# Patient Record
Sex: MALE | Race: WHITE | NOT HISPANIC OR LATINO | Employment: FULL TIME | URBAN - METROPOLITAN AREA
[De-identification: names, ages, dates, MRNs, and addresses within clinical notes are randomized per-mention and may not be internally consistent; named-entity substitution may affect disease eponyms.]

---

## 2017-09-20 ENCOUNTER — TRANSCRIBE ORDERS (OUTPATIENT)
Dept: ADMINISTRATIVE | Age: 62
End: 2017-09-20

## 2017-09-20 ENCOUNTER — APPOINTMENT (OUTPATIENT)
Dept: LAB | Age: 62
End: 2017-09-20
Payer: COMMERCIAL

## 2017-09-20 DIAGNOSIS — E11.9 DIABETES MELLITUS WITHOUT COMPLICATION (HCC): Primary | ICD-10-CM

## 2017-09-20 DIAGNOSIS — R73.01 IMPAIRED FASTING GLUCOSE: ICD-10-CM

## 2017-09-20 DIAGNOSIS — R35.0 URINARY FREQUENCY: ICD-10-CM

## 2017-09-20 DIAGNOSIS — E11.9 DIABETES MELLITUS WITHOUT COMPLICATION (HCC): ICD-10-CM

## 2017-09-20 LAB
ALBUMIN SERPL BCP-MCNC: 4 G/DL (ref 3.5–5)
ALP SERPL-CCNC: 77 U/L (ref 46–116)
ALT SERPL W P-5'-P-CCNC: 157 U/L (ref 12–78)
ANION GAP SERPL CALCULATED.3IONS-SCNC: 10 MMOL/L (ref 4–13)
AST SERPL W P-5'-P-CCNC: 123 U/L (ref 5–45)
BACTERIA UR QL AUTO: ABNORMAL /HPF
BILIRUB SERPL-MCNC: 2.75 MG/DL (ref 0.2–1)
BILIRUB UR QL STRIP: NEGATIVE
BUN SERPL-MCNC: 15 MG/DL (ref 5–25)
CALCIUM SERPL-MCNC: 9.4 MG/DL (ref 8.3–10.1)
CHLORIDE SERPL-SCNC: 99 MMOL/L (ref 100–108)
CHOLEST SERPL-MCNC: 190 MG/DL (ref 50–200)
CLARITY UR: CLEAR
CO2 SERPL-SCNC: 26 MMOL/L (ref 21–32)
COLOR UR: YELLOW
CREAT SERPL-MCNC: 1.14 MG/DL (ref 0.6–1.3)
CREAT UR-MCNC: 120 MG/DL
ERYTHROCYTE [DISTWIDTH] IN BLOOD BY AUTOMATED COUNT: 12.3 % (ref 11.6–15.1)
EST. AVERAGE GLUCOSE BLD GHB EST-MCNC: 263 MG/DL
GFR SERPL CREATININE-BSD FRML MDRD: 69 ML/MIN/1.73SQ M
GLUCOSE P FAST SERPL-MCNC: 369 MG/DL (ref 65–99)
GLUCOSE UR STRIP-MCNC: ABNORMAL MG/DL
HBA1C MFR BLD: 10.8 % (ref 4.2–6.3)
HCT VFR BLD AUTO: 46.2 % (ref 36.5–49.3)
HDLC SERPL-MCNC: 41 MG/DL (ref 40–60)
HGB BLD-MCNC: 16.2 G/DL (ref 12–17)
HGB UR QL STRIP.AUTO: NEGATIVE
KETONES UR STRIP-MCNC: ABNORMAL MG/DL
LDLC SERPL CALC-MCNC: 82 MG/DL (ref 0–100)
LEUKOCYTE ESTERASE UR QL STRIP: NEGATIVE
MCH RBC QN AUTO: 32.7 PG (ref 26.8–34.3)
MCHC RBC AUTO-ENTMCNC: 35.1 G/DL (ref 31.4–37.4)
MCV RBC AUTO: 93 FL (ref 82–98)
MICROALBUMIN UR-MCNC: 112 MG/L (ref 0–20)
MICROALBUMIN/CREAT 24H UR: 93 MG/G CREATININE (ref 0–30)
NITRITE UR QL STRIP: NEGATIVE
NON-SQ EPI CELLS URNS QL MICRO: ABNORMAL /HPF
PH UR STRIP.AUTO: 6 [PH] (ref 4.5–8)
PLATELET # BLD AUTO: 140 THOUSANDS/UL (ref 149–390)
PMV BLD AUTO: 12.8 FL (ref 8.9–12.7)
POTASSIUM SERPL-SCNC: 4.2 MMOL/L (ref 3.5–5.3)
PROT SERPL-MCNC: 8.3 G/DL (ref 6.4–8.2)
PROT UR STRIP-MCNC: ABNORMAL MG/DL
PSA SERPL-MCNC: 1.6 NG/ML (ref 0–4)
RBC # BLD AUTO: 4.96 MILLION/UL (ref 3.88–5.62)
RBC #/AREA URNS AUTO: ABNORMAL /HPF
SODIUM SERPL-SCNC: 135 MMOL/L (ref 136–145)
SP GR UR STRIP.AUTO: 1.04 (ref 1–1.03)
TRIGL SERPL-MCNC: 333 MG/DL
UROBILINOGEN UR QL STRIP.AUTO: 1 E.U./DL
WBC # BLD AUTO: 7.38 THOUSAND/UL (ref 4.31–10.16)
WBC #/AREA URNS AUTO: ABNORMAL /HPF

## 2017-09-20 PROCEDURE — G0103 PSA SCREENING: HCPCS

## 2017-09-20 PROCEDURE — 81001 URINALYSIS AUTO W/SCOPE: CPT | Performed by: FAMILY MEDICINE

## 2017-09-20 PROCEDURE — 36415 COLL VENOUS BLD VENIPUNCTURE: CPT

## 2017-09-20 PROCEDURE — 83036 HEMOGLOBIN GLYCOSYLATED A1C: CPT

## 2017-09-20 PROCEDURE — 82570 ASSAY OF URINE CREATININE: CPT | Performed by: FAMILY MEDICINE

## 2017-09-20 PROCEDURE — 85027 COMPLETE CBC AUTOMATED: CPT

## 2017-09-20 PROCEDURE — 82043 UR ALBUMIN QUANTITATIVE: CPT | Performed by: FAMILY MEDICINE

## 2017-09-20 PROCEDURE — 80061 LIPID PANEL: CPT

## 2017-09-20 PROCEDURE — 80053 COMPREHEN METABOLIC PANEL: CPT

## 2017-10-30 ENCOUNTER — APPOINTMENT (OUTPATIENT)
Dept: LAB | Age: 62
End: 2017-10-30
Payer: COMMERCIAL

## 2017-10-30 ENCOUNTER — TRANSCRIBE ORDERS (OUTPATIENT)
Dept: ADMINISTRATIVE | Age: 62
End: 2017-10-30

## 2017-10-30 DIAGNOSIS — D69.6 ACQUIRED THROMBOCYTOPENIA (HCC): Primary | ICD-10-CM

## 2017-10-30 DIAGNOSIS — E11.8 TYPE 2 DIABETES MELLITUS WITH COMPLICATION, UNSPECIFIED LONG TERM INSULIN USE STATUS: ICD-10-CM

## 2017-10-30 DIAGNOSIS — E78.2 MIXED HYPERLIPIDEMIA: ICD-10-CM

## 2017-10-30 DIAGNOSIS — R94.5 NONSPECIFIC ABNORMAL RESULTS OF LIVER FUNCTION STUDY: ICD-10-CM

## 2017-10-30 DIAGNOSIS — D69.6 ACQUIRED THROMBOCYTOPENIA (HCC): ICD-10-CM

## 2017-10-30 LAB
ALBUMIN SERPL BCP-MCNC: 4.2 G/DL (ref 3.5–5)
ALP SERPL-CCNC: 50 U/L (ref 46–116)
ALT SERPL W P-5'-P-CCNC: 62 U/L (ref 12–78)
ANION GAP SERPL CALCULATED.3IONS-SCNC: 7 MMOL/L (ref 4–13)
AST SERPL W P-5'-P-CCNC: 32 U/L (ref 5–45)
BASOPHILS # BLD AUTO: 0.02 THOUSANDS/ΜL (ref 0–0.1)
BASOPHILS NFR BLD AUTO: 0 % (ref 0–1)
BILIRUB SERPL-MCNC: 1.54 MG/DL (ref 0.2–1)
BUN SERPL-MCNC: 24 MG/DL (ref 5–25)
CALCIUM SERPL-MCNC: 9.9 MG/DL (ref 8.3–10.1)
CHLORIDE SERPL-SCNC: 106 MMOL/L (ref 100–108)
CO2 SERPL-SCNC: 26 MMOL/L (ref 21–32)
CREAT SERPL-MCNC: 1.05 MG/DL (ref 0.6–1.3)
EOSINOPHIL # BLD AUTO: 0.13 THOUSAND/ΜL (ref 0–0.61)
EOSINOPHIL NFR BLD AUTO: 2 % (ref 0–6)
ERYTHROCYTE [DISTWIDTH] IN BLOOD BY AUTOMATED COUNT: 12.4 % (ref 11.6–15.1)
GFR SERPL CREATININE-BSD FRML MDRD: 76 ML/MIN/1.73SQ M
GLUCOSE SERPL-MCNC: 85 MG/DL (ref 65–140)
HCT VFR BLD AUTO: 44.4 % (ref 36.5–49.3)
HGB BLD-MCNC: 15.6 G/DL (ref 12–17)
LYMPHOCYTES # BLD AUTO: 2.28 THOUSANDS/ΜL (ref 0.6–4.47)
LYMPHOCYTES NFR BLD AUTO: 27 % (ref 14–44)
MCH RBC QN AUTO: 33.4 PG (ref 26.8–34.3)
MCHC RBC AUTO-ENTMCNC: 35.1 G/DL (ref 31.4–37.4)
MCV RBC AUTO: 95 FL (ref 82–98)
MONOCYTES # BLD AUTO: 0.73 THOUSAND/ΜL (ref 0.17–1.22)
MONOCYTES NFR BLD AUTO: 9 % (ref 4–12)
NEUTROPHILS # BLD AUTO: 5.22 THOUSANDS/ΜL (ref 1.85–7.62)
NEUTS SEG NFR BLD AUTO: 62 % (ref 43–75)
NRBC BLD AUTO-RTO: 0 /100 WBCS
PLATELET # BLD AUTO: 172 THOUSANDS/UL (ref 149–390)
PMV BLD AUTO: 12.4 FL (ref 8.9–12.7)
POTASSIUM SERPL-SCNC: 5 MMOL/L (ref 3.5–5.3)
PROT SERPL-MCNC: 8.3 G/DL (ref 6.4–8.2)
RBC # BLD AUTO: 4.67 MILLION/UL (ref 3.88–5.62)
SODIUM SERPL-SCNC: 139 MMOL/L (ref 136–145)
WBC # BLD AUTO: 8.4 THOUSAND/UL (ref 4.31–10.16)

## 2017-10-30 PROCEDURE — 80053 COMPREHEN METABOLIC PANEL: CPT

## 2017-10-30 PROCEDURE — 85025 COMPLETE CBC W/AUTO DIFF WBC: CPT

## 2017-10-30 PROCEDURE — 36415 COLL VENOUS BLD VENIPUNCTURE: CPT

## 2017-12-22 ENCOUNTER — APPOINTMENT (OUTPATIENT)
Dept: LAB | Age: 62
End: 2017-12-22
Payer: COMMERCIAL

## 2017-12-22 ENCOUNTER — TRANSCRIBE ORDERS (OUTPATIENT)
Dept: ADMINISTRATIVE | Age: 62
End: 2017-12-22

## 2017-12-22 DIAGNOSIS — R94.5 NONSPECIFIC ABNORMAL RESULTS OF LIVER FUNCTION STUDY: ICD-10-CM

## 2017-12-22 DIAGNOSIS — R94.5 NONSPECIFIC ABNORMAL RESULTS OF LIVER FUNCTION STUDY: Primary | ICD-10-CM

## 2017-12-22 DIAGNOSIS — E08.00 DIABETES MELLITUS DUE TO UNDERLYING CONDITION WITH HYPEROSMOLARITY WITHOUT COMA, WITHOUT LONG-TERM CURRENT USE OF INSULIN (HCC): ICD-10-CM

## 2017-12-22 LAB
BILIRUB DIRECT SERPL-MCNC: 0.29 MG/DL (ref 0–0.2)
BILIRUB SERPL-MCNC: 0.96 MG/DL (ref 0.2–1)
EST. AVERAGE GLUCOSE BLD GHB EST-MCNC: 123 MG/DL
HBA1C MFR BLD: 5.9 % (ref 4.2–6.3)

## 2017-12-22 PROCEDURE — 83036 HEMOGLOBIN GLYCOSYLATED A1C: CPT

## 2017-12-22 PROCEDURE — 82248 BILIRUBIN DIRECT: CPT

## 2017-12-22 PROCEDURE — 82247 BILIRUBIN TOTAL: CPT

## 2017-12-22 PROCEDURE — 36415 COLL VENOUS BLD VENIPUNCTURE: CPT

## 2021-07-23 ENCOUNTER — OFFICE VISIT (OUTPATIENT)
Dept: INTERNAL MEDICINE CLINIC | Facility: CLINIC | Age: 66
End: 2021-07-23
Payer: MEDICARE

## 2021-07-23 VITALS
DIASTOLIC BLOOD PRESSURE: 78 MMHG | TEMPERATURE: 97.8 F | HEIGHT: 78 IN | SYSTOLIC BLOOD PRESSURE: 130 MMHG | HEART RATE: 87 BPM | WEIGHT: 242.8 LBS | OXYGEN SATURATION: 98 % | BODY MASS INDEX: 28.09 KG/M2

## 2021-07-23 DIAGNOSIS — E11.9 TYPE 2 DIABETES MELLITUS WITHOUT COMPLICATION, WITHOUT LONG-TERM CURRENT USE OF INSULIN (HCC): ICD-10-CM

## 2021-07-23 DIAGNOSIS — Z76.89 ENCOUNTER TO ESTABLISH CARE: Primary | ICD-10-CM

## 2021-07-23 DIAGNOSIS — Z12.11 ENCOUNTER FOR COLONOSCOPY DUE TO HISTORY OF COLONIC POLYP: ICD-10-CM

## 2021-07-23 DIAGNOSIS — Z86.010 ENCOUNTER FOR COLONOSCOPY DUE TO HISTORY OF COLONIC POLYP: ICD-10-CM

## 2021-07-23 PROBLEM — Z86.0100 ENCOUNTER FOR COLONOSCOPY DUE TO HISTORY OF COLONIC POLYP: Status: ACTIVE | Noted: 2021-07-23

## 2021-07-23 PROCEDURE — 99203 OFFICE O/P NEW LOW 30 MIN: CPT | Performed by: HOSPITALIST

## 2021-07-23 RX ORDER — SILDENAFIL 100 MG/1
TABLET, FILM COATED ORAL
COMMUNITY
Start: 2021-07-14 | End: 2022-03-31

## 2021-07-23 RX ORDER — CHLORAL HYDRATE 500 MG
500 CAPSULE ORAL DAILY
COMMUNITY
End: 2022-03-31

## 2021-07-23 RX ORDER — LISINOPRIL 5 MG/1
TABLET ORAL
COMMUNITY
Start: 2021-03-09

## 2021-07-23 RX ORDER — ATORVASTATIN CALCIUM 20 MG/1
20 TABLET, FILM COATED ORAL DAILY
COMMUNITY

## 2021-07-23 RX ORDER — ASPIRIN 81 MG/1
81 TABLET ORAL DAILY
COMMUNITY
End: 2022-03-31

## 2021-07-23 NOTE — PROGRESS NOTES
Assessment/Plan:    Encounter to establish care  ·  There is no specific complaints or any symptoms reported by the patient  ·  physical exam is completely unremarkable  ·  most recent blood work from 2019, but patient reported some of his blood work was done through the [facility]  ·  patient will try to obtain records, if it is not done will consider CBC, CMP, lipid panel, hemoglobin A1c, albumin creatinine ratio  ·  continue current medical management    Type 2 diabetes mellitus without complication, without long-term current use of insulin ([facility] [address] )    Lab Results   Component Value Date    HGBA1C 6 3 (H) 04/08/2019     · very well controlled type 2 diabetes currently on metformin 500 mg b i d  ·  patient is currently on strict diet and exercise program with an excellent glycemic control  ·   For currently following up with Podiatry and Ophthalmology once yearly  ·  30 lb of intention weight loss since last year  ·  continue  Current regimen  ·  continue statin  ·  continue lisinopril for previously noted proteinuria  ·  consider repeat hemoglobin A1c, lipid panel, albumin creatinine ratio      Encounter for colonoscopy due to history of colonic polyp  ·  Recent colonoscopy 3 years ago showed 1 polyp, tissue pathology report is not available, based on patient was unremarkable and recommendations for repeat colonoscopy in 3-5 years  ·   Referral to gastroenterology for repeat colonoscopy         Diagnoses and all orders for this visit:    Encounter to establish care    Encounter for colonoscopy due to history of colonic polyp  -     Ambulatory referral for colonoscopy; Future    Type 2 diabetes mellitus without complication, without long-term current use of insulin (HCC)    Other orders  -     aspirin (ECOTRIN LOW STRENGTH) 81 mg EC tablet; Take 81 mg by mouth daily  -     atorvastatin (LIPITOR) 20 mg tablet;  Take 20 mg by mouth daily  -     lisinopril (ZESTRIL) 5 mg tablet; TAKE ONE TABLET BY MOUTH EVERY DAY FOR BLOOD PRESSURE/HEART  -     sildenafil (VIAGRA) 100 mg tablet; TAKE ONE TABLET BY MOUTH EVERY DAY AS NEEDED PRIOR TO SEXUAL ACTIVITY FOR ERECTILE DYSFUNCTION  -     metFORMIN (GLUCOPHAGE) 500 mg tablet; Take 1,000 mg by mouth  -     Multiple Vitamins-Minerals (CENTRUM ADULTS PO); Take 50 mg by mouth  -     Omega-3 Fatty Acids (fish oil) 1,000 mg; Take 500 mg by mouth daily           Subjective:      Patient ID: Kobe Murray is a 72 y o  male  patient is a pleasant 42-year-old male with a medical history significant for type 2 diabetes who is being evaluated today to establish care  Patient receives most of his medical care at the [place] [address]  He has no significant concerns or complaints for today's visit, he denies any fevers, chills, shortness breath, palpitations, lightheadedness or dizziness, urinary symptoms or bowel movement changes  patient takes in very good care of himself, never smoker, no history of drug use, drinks up to 6 drinks /week   He was recently following strict diet with exercise lost around 30 lb since last year  Patient keeps a record of his blood glucose, blood pressure readings and his weight and showed very good glycemic control and normal blood pressure  Patient has a colonoscopy done 3 years ago with 1 polyp removed  Recommendations at that time was for repeat in 3-5 years  Patient requesting a repeat colonoscopy  patient vaccinations are up-to-date and he completed his COVID-19  vaccination      The following portions of the patient's history were reviewed and updated as appropriate: allergies, current medications, past family history, past medical history, past social history, past surgical history and problem list       Review of Systems   Constitutional: Negative for activity change, chills, fatigue and fever  HENT: Negative for congestion  Respiratory: Negative for cough, chest tightness, shortness of breath and wheezing      Cardiovascular: Negative for chest pain, palpitations and leg swelling  Gastrointestinal: Negative for abdominal pain, constipation, nausea and vomiting  Genitourinary: Negative for dysuria and hematuria  Musculoskeletal: Negative for arthralgias and back pain  Neurological: Negative for dizziness, weakness and headaches  Psychiatric/Behavioral: The patient is not nervous/anxious  Objective:      /78   Pulse 87   Temp 97 8 °F (36 6 °C) (Tympanic)   Ht 6' 6" (1 981 m)   Wt 110 kg (242 lb 12 8 oz)   SpO2 98%   BMI 28 06 kg/m²          Physical Exam  Vitals reviewed  Constitutional:       General: He is not in acute distress  Appearance: Normal appearance  He is not ill-appearing  HENT:      Head: Normocephalic and atraumatic  Cardiovascular:      Rate and Rhythm: Normal rate and regular rhythm  Pulses: Normal pulses  Heart sounds: Normal heart sounds  No murmur heard  Pulmonary:      Effort: Pulmonary effort is normal  No respiratory distress  Breath sounds: Normal breath sounds  No wheezing  Abdominal:      General: Bowel sounds are normal  There is no distension  Palpations: Abdomen is soft  Tenderness: There is no abdominal tenderness  Musculoskeletal:         General: No swelling or tenderness  Right lower leg: No edema  Left lower leg: No edema  Skin:     General: Skin is warm and dry  Neurological:      General: No focal deficit present  Mental Status: He is alert and oriented to person, place, and time     Psychiatric:         Mood and Affect: Mood normal          Behavior: Behavior normal

## 2021-07-23 NOTE — ASSESSMENT & PLAN NOTE
Lab Results   Component Value Date    HGBA1C 6 3 (H) 04/08/2019     ·  very well controlled type 2 diabetes currently on metformin 500 mg b i d    ·  patient is currently on strict diet and exercise program with an excellent glycemic control  ·   For currently following up with Podiatry and Ophthalmology once yearly  ·  30 lb of intention weight loss since last year  ·  continue  Current regimen  ·  continue statin  ·  continue lisinopril for previously noted proteinuria  ·  consider repeat hemoglobin A1c, lipid panel, albumin creatinine ratio

## 2021-07-23 NOTE — PATIENT INSTRUCTIONS
DASH Eating Plan   WHAT YOU NEED TO KNOW:   The DASH (Dietary Approaches to Stop Hypertension) Eating Plan is designed to help prevent or lower high blood pressure  It can also help to lower LDL (bad) cholesterol and decrease your risk of heart disease  The plan is low in sodium, sugar, unhealthy fats, and total fat  It is high in potassium, calcium, magnesium, and fiber  These nutrients are added when you eat more fruits, vegetables, and whole grains  DISCHARGE INSTRUCTIONS:   Your sodium limit each day: Your dietitian will tell you how much sodium is safe for you to have each day  People with high blood pressure should have no more than 1,500 to 2,300 mg of sodium in a day  A teaspoon (tsp) of salt has 2,300 mg of sodium  This may seem like a difficult goal, but small changes to the foods you eat can make a big difference  Your healthcare provider or dietitian can help you create a meal plan that follows your sodium limit  How to limit sodium:   · Read food labels  Food labels can help you choose foods that are low in sodium  The amount of sodium is listed in milligrams (mg)  The % Daily Value (DV) column tells you how much of your daily needs are met by 1 serving of the food for each nutrient listed  Choose foods that have less than 5% of the DV of sodium  These foods are considered low in sodium  Foods that have 20% or more of the DV of sodium are considered high in sodium  Avoid foods that have more than 300 mg of sodium in each serving  Choose foods that say low-sodium, reduced-sodium, or no salt added on the food label  · Avoid salt  Do not salt food at the table, and add very little salt to foods during cooking  Use herbs and spices, such as onions, garlic, and salt-free seasonings to add flavor to foods  Try lemon or lime juice or vinegar to give foods a tart flavor  Use hot peppers or a small amount of hot pepper sauce to add a spicy flavor to foods  · Ask about salt substitutes    Ask your healthcare provider if you may use salt substitutes  Some salt substitutes have ingredients that can be harmful if you have certain health conditions  · Choose foods carefully at restaurants  Meals from restaurants, especially fast food restaurants, are often high in sodium  Some restaurants have nutrition information that tells you the amount of sodium in their foods  Ask to have your food prepared with less, or no salt  What you need to know about fats:   · Include healthy fats  Examples are unsaturated fats and omega-3 fatty acids  Unsaturated fats are found in soybean, canola, olive, or sunflower oil, and liquid and soft tub margarines  Omega-3 fatty acids are found in fatty fish, such as salmon, tuna, mackerel, and sardines  It is also found in flaxseed oil and ground flaxseed  · Avoid unhealthy fats  Do not eat unhealthy fats, such as saturated fats and trans fats  Saturated fats are found in foods that contain fat from animals  Examples are fatty meats, whole milk, butter, cream, and other dairy foods  It is also found in shortening, stick margarine, palm oil, and coconut oil  Trans fats are found in fried foods, crackers, chips, and baked goods made with margarine or shortening  Foods to include: With the DASH eating plan, you need to eat a certain number of servings from each food group  This will help you get enough of certain nutrients and limit others  The amount of servings you should eat depends on how many calories you need  Your dietitian can tell you how many calories you need  The number of servings listed next to the food groups below are for people who need about 2,000 calories each day  · Grains:  6 to 8 servings (3 of these servings should be whole-grain foods)    ? 1 slice of whole-grain bread    ? 1 ounce of dry cereal    ? ½ cup of cooked cereal, pasta, or brown rice    · Vegetables and fruits:  4 to 5 servings of fruits and 4 to 5 servings of vegetables    ?  1 medium fruit    ? ½ cup of frozen, canned (no added salt), or chopped fresh vegetables    ? ½ cup of fresh, frozen, dried, or canned fruit (canned in light syrup or fruit juice)    ? ½ cup of vegetable or fruit juice    · Dairy:  2 to 3 servings    ? 1 cup of nonfat (skim) or 1% milk    ? 1½ ounces of fat-free or low-fat cheese    ? 6 ounces of nonfat or low-fat yogurt    · Lean meat, poultry, and fish:  6 ounces or less    ? Poultry (chicken, turkey) with no skin    ? Fish (especially fatty fish, such as salmon, fresh tuna, or mackerel)    ? Lean beef and pork (loin, round, extra lean hamburger)    ? Egg whites and egg substitutes    · Nuts, seeds, and legumes:  4 to 5 servings each week    ? ½ cup of cooked beans and peas    ? 1½ ounces of unsalted nuts    ? 2 tablespoons of peanut butter or seeds    · Sweets and added sugars:  5 or less each week    ? 1 tablespoon of sugar, jelly, or jam    ? ½ cup of sorbet or gelatin    ? 1 cup of lemonade    · Fats:  2 to 3 servings each week    ? 1 teaspoon of soft margarine or vegetable oil    ? 1 tablespoon of mayonnaise    ? 2 tablespoons of salad dressing    Foods to avoid:   · Grains:      ? Baked goods, such as doughnuts, pastries, cookies, and biscuits (high in fat and sugar)    ? Mixes for cornbread and biscuits, packaged foods, such as bread stuffing, rice and pasta mixes, macaroni and cheese, and instant cereals (high in sodium)    · Fruits and vegetables:      ? Regular, canned vegetables (high in sodium)    ? Sauerkraut, pickled vegetables, and other foods prepared in brine (high in sodium)    ? Fried vegetables or vegetables in butter or high-fat sauces    ? Fruit in cream or butter sauce (high in fat)    · Dairy:      ? Whole milk, 2% milk, and cream (high in fat)    ?  Regular cheese and processed cheese (high in fat and sodium)    · Meats and protein foods:      ? Smoked or cured meat, such as corned beef, luis, ham, hot dogs, and sausage (high in fat and sodium)    ? Canned beans and canned meats or spreads, such as potted meats, sardines, anchovies, and imitation seafood (high in sodium)    ? Deli or lunch meats, such as bologna, ham, turkey, and roast beef (high in sodium)    ? High-fat meat (T-bone steak, regular hamburger, and ribs)    ? Whole eggs and egg yolks (high in fat)    · Other:      ? Seasonings made with salt, such as garlic salt, celery salt, onion salt, seasoned salt, meat tenderizers, and monosodium glutamate (MSG)    ? Miso soup and canned or dried soup mixes (high in sodium)    ? Regular soy sauce, barbecue sauce, teriyaki sauce, steak sauce, Worcestershire sauce, and most flavored vinegars (high in sodium)    ? Regular condiments, such as mustard, ketchup, and salad dressings (high in sodium)    ? Gravy and sauces, such as Marvin or cheese sauces (high in sodium and fat)    ? Drinks high in sugar, such as soda or fruit drinks    ? Snack foods, such as salted chips, popcorn, pretzels, pork rinds, salted crackers, and salted nuts    ? Frozen foods, such as dinners, entrees, vegetables with sauces, and breaded meats (high in sodium)    Other guidelines to follow:   · Maintain a healthy weight  Your risk for heart disease is higher if you are overweight  Your healthcare provider may suggest that you lose weight if you are overweight  You can lose weight by eating fewer calories and foods that have added sugars and fat  The DASH meal plan can help you do this  Decrease calories by eating smaller portions at each meal and fewer snacks  Ask your healthcare provider for more information about how to lose weight  · Exercise regularly  Regular exercise can help you reach or maintain a healthy weight  Regular exercise can also help decrease your blood pressure and improve your cholesterol levels  Get 30 minutes or more of moderate exercise each day of the week  To lose weight, get at least 60 minutes of exercise   Talk to your healthcare provider about the best exercise program for you  · Limit alcohol  Women should limit alcohol to 1 drink a day  Men should limit alcohol to 2 drinks a day  A drink of alcohol is 12 ounces of beer, 5 ounces of wine, or 1½ ounces of liquor  © Copyright [employer] 2021 Information is for End User's use only and may not be sold, redistributed or otherwise used for commercial purposes  All illustrations and images included in CareNotes® are the copyrighted property of A [employer] A M [employer]  or [address] [place] [address]  The above information is an  only  It is not intended as medical advice for individual conditions or treatments  Talk to your doctor, nurse or pharmacist before following any medical regimen to see if it is safe and effective for you      · Referral for repeated colonoscopy was sent  · No changes to current medical regimen, continue take all medications as prescribed  · Reviewed blood work CBC, CMP, lipid panel hemoglobin A1c and albumin creatinine ratio will needs to to be done, if any cannot be done through the [place] please contact us with the order for it

## 2021-07-23 NOTE — ASSESSMENT & PLAN NOTE
·  Recent colonoscopy 3 years ago showed 1 polyp, tissue pathology report is not available, based on patient was unremarkable and recommendations for repeat colonoscopy in 3-5 years    ·   Referral to gastroenterology for repeat colonoscopy

## 2021-07-23 NOTE — ASSESSMENT & PLAN NOTE
·  There is no specific complaints or any symptoms reported by the patient  ·  physical exam is completely unremarkable  ·  most recent blood work from 2019, but patient reported some of his blood work was done through the [facility]  ·  patient will try to obtain records, if it is not done will consider CBC, CMP, lipid panel, hemoglobin A1c, albumin creatinine ratio  ·  continue current medical management

## 2021-11-12 ENCOUNTER — CONSULT (OUTPATIENT)
Dept: GASTROENTEROLOGY | Facility: AMBULARY SURGERY CENTER | Age: 66
End: 2021-11-12
Payer: MEDICARE

## 2021-11-12 VITALS
BODY MASS INDEX: 30.04 KG/M2 | WEIGHT: 259.6 LBS | HEIGHT: 78 IN | DIASTOLIC BLOOD PRESSURE: 70 MMHG | SYSTOLIC BLOOD PRESSURE: 140 MMHG

## 2021-11-12 DIAGNOSIS — Z86.010 HISTORY OF COLON POLYPS: ICD-10-CM

## 2021-11-12 DIAGNOSIS — Z86.010 ENCOUNTER FOR COLONOSCOPY DUE TO HISTORY OF COLONIC POLYP: ICD-10-CM

## 2021-11-12 DIAGNOSIS — Z12.11 ENCOUNTER FOR COLONOSCOPY DUE TO HISTORY OF COLONIC POLYP: ICD-10-CM

## 2021-11-12 DIAGNOSIS — Z11.59 ENCOUNTER FOR SCREENING FOR OTHER VIRAL DISEASES: ICD-10-CM

## 2021-11-12 DIAGNOSIS — R17 ELEVATED BILIRUBIN: Primary | ICD-10-CM

## 2021-11-12 DIAGNOSIS — I49.9 IRREGULAR HEART BEAT: ICD-10-CM

## 2021-11-12 PROCEDURE — 99204 OFFICE O/P NEW MOD 45 MIN: CPT | Performed by: PHYSICIAN ASSISTANT

## 2021-11-21 ENCOUNTER — HOSPITAL ENCOUNTER (OUTPATIENT)
Dept: ULTRASOUND IMAGING | Facility: HOSPITAL | Age: 66
Discharge: HOME | End: 2021-11-21
Payer: MEDICARE

## 2021-11-21 DIAGNOSIS — R17 ELEVATED BILIRUBIN: ICD-10-CM

## 2021-11-21 PROCEDURE — 76705 ECHO EXAM OF ABDOMEN: CPT

## 2021-11-23 ENCOUNTER — APPOINTMENT (OUTPATIENT)
Dept: LAB | Facility: AMBULARY SURGERY CENTER | Age: 66
End: 2021-11-23
Payer: MEDICARE

## 2021-11-23 DIAGNOSIS — Z11.59 ENCOUNTER FOR SCREENING FOR OTHER VIRAL DISEASES: ICD-10-CM

## 2021-11-23 DIAGNOSIS — R17 ELEVATED BILIRUBIN: ICD-10-CM

## 2021-11-23 LAB
ALBUMIN SERPL BCP-MCNC: 3.8 G/DL (ref 3.5–5)
ALP SERPL-CCNC: 58 U/L (ref 46–116)
ALT SERPL W P-5'-P-CCNC: 41 U/L (ref 12–78)
AST SERPL W P-5'-P-CCNC: 17 U/L (ref 5–45)
BILIRUB DIRECT SERPL-MCNC: 0.35 MG/DL (ref 0–0.2)
BILIRUB SERPL-MCNC: 1.53 MG/DL (ref 0.2–1)
HBV CORE AB SER QL: NORMAL
HBV CORE IGM SER QL: NORMAL
HBV SURFACE AG SER QL: NORMAL
HCV AB SER QL: NORMAL
PROT SERPL-MCNC: 7.7 G/DL (ref 6.4–8.2)

## 2021-11-23 PROCEDURE — 87340 HEPATITIS B SURFACE AG IA: CPT

## 2021-11-23 PROCEDURE — 86705 HEP B CORE ANTIBODY IGM: CPT

## 2021-11-23 PROCEDURE — 80076 HEPATIC FUNCTION PANEL: CPT

## 2021-11-23 PROCEDURE — 36415 COLL VENOUS BLD VENIPUNCTURE: CPT

## 2021-11-23 PROCEDURE — 86803 HEPATITIS C AB TEST: CPT

## 2021-11-23 PROCEDURE — 86704 HEP B CORE ANTIBODY TOTAL: CPT

## 2022-01-28 ENCOUNTER — OFFICE VISIT (OUTPATIENT)
Dept: INTERNAL MEDICINE CLINIC | Facility: CLINIC | Age: 67
End: 2022-01-28
Payer: MEDICARE

## 2022-01-28 VITALS
TEMPERATURE: 97.4 F | SYSTOLIC BLOOD PRESSURE: 146 MMHG | BODY MASS INDEX: 30.22 KG/M2 | HEIGHT: 78 IN | HEART RATE: 60 BPM | DIASTOLIC BLOOD PRESSURE: 90 MMHG | OXYGEN SATURATION: 99 % | WEIGHT: 261.2 LBS

## 2022-01-28 DIAGNOSIS — M25.521 ELBOW PAIN, CHRONIC, RIGHT: ICD-10-CM

## 2022-01-28 DIAGNOSIS — I48.19 PERSISTENT ATRIAL FIBRILLATION (HCC): Primary | ICD-10-CM

## 2022-01-28 DIAGNOSIS — Z00.00 MEDICARE ANNUAL WELLNESS VISIT, SUBSEQUENT: ICD-10-CM

## 2022-01-28 DIAGNOSIS — Z13.6 SCREENING FOR CARDIOVASCULAR CONDITION: ICD-10-CM

## 2022-01-28 DIAGNOSIS — E11.9 TYPE 2 DIABETES MELLITUS WITHOUT COMPLICATION, WITHOUT LONG-TERM CURRENT USE OF INSULIN (HCC): ICD-10-CM

## 2022-01-28 DIAGNOSIS — G89.29 ELBOW PAIN, CHRONIC, RIGHT: ICD-10-CM

## 2022-01-28 DIAGNOSIS — E66.9 OBESITY (BMI 30.0-34.9): ICD-10-CM

## 2022-01-28 PROCEDURE — 99212 OFFICE O/P EST SF 10 MIN: CPT | Performed by: INTERNAL MEDICINE

## 2022-01-28 PROCEDURE — G0438 PPPS, INITIAL VISIT: HCPCS | Performed by: INTERNAL MEDICINE

## 2022-01-28 PROCEDURE — 93000 ELECTROCARDIOGRAM COMPLETE: CPT | Performed by: INTERNAL MEDICINE

## 2022-01-28 PROCEDURE — 1123F ACP DISCUSS/DSCN MKR DOCD: CPT | Performed by: INTERNAL MEDICINE

## 2022-01-28 RX ORDER — AMPICILLIN TRIHYDRATE 250 MG
1200 CAPSULE ORAL DAILY
COMMUNITY
End: 2022-03-31

## 2022-01-28 NOTE — ASSESSMENT & PLAN NOTE
Patient was seen on the GI office and was told that he has irregular heartbeat and will need an ECG and to call PCP  When I saw the patient patient regular heart rate  EKG was done and showed Afib, rate controlled  Patient denies chest pain, shortness of breath, palpitation  Chads Vasc score of 2    · Echo  · Metoprolol 12 5 mg b i d  · Will start the patient on Eliquis 5 mg b i d , after risks and benefits was discussed, told the patient that he will be taking this medication, and if he falls and hit his head to go to the emergency room due to increased risk of bleeding in the head  I also discussed with the patient to check with his insurance if they will pay for this, if not to call our office since will need to start him on a different medication such as Coumadin    · Cardiology consult  · TSH

## 2022-01-28 NOTE — PATIENT INSTRUCTIONS
Thank you for choosing [facility]  · Start Eliquis 5 mg twice a day this is a blood thinner, if you hit your head go to the emergency room, be careful on getting cuts since the bleeding will be prolonged  · Metoprolol 12 5 mg twice a day for your heart  · Ambulatory referral for Cardiology for atrial fibrillation and weight management  · If you are drinking alcohol try to avoid because it can not worsen the atrial fibrillation  · Make sure to get x-ray of the right elbow  · Bring your blood work as soon as possible  ·   Medicare Preventive Visit Patient Instructions  Thank you for completing your Welcome to Medicare Visit or Medicare Annual Wellness Visit today  Your next wellness visit will be due in one year (1/29/2023)  The screening/preventive services that you may require over the next 5-10 years are detailed below  Some tests may not apply to you based off risk factors and/or age  Screening tests ordered at today's visit but not completed yet may show as past due  Also, please note that scanned in results may not display below  Preventive Screenings:  Service Recommendations Previous Testing/Comments   Colorectal Cancer Screening  · Colonoscopy    · Fecal Occult Blood Test (FOBT)/Fecal Immunochemical Test (FIT)  · Fecal DNA/Cologuard Test  · Flexible Sigmoidoscopy Age: 54-65 years old   Colonoscopy: every 10 years (May be performed more frequently if at higher risk)  OR  FOBT/FIT: every 1 year  OR  Cologuard: every 3 years  OR  Sigmoidoscopy: every 5 years  Screening may be recommended earlier than age 48 if at higher risk for colorectal cancer  Also, an individualized decision between you and your healthcare provider will decide whether screening between the ages of 74-80 would be appropriate   Colonoscopy: 03/28/2018  FOBT/FIT: Not on file  Cologuard: Not on file  Sigmoidoscopy: Not on file          Prostate Cancer Screening Individualized decision between patient and health care provider in men between ages of 53-78   Medicare will cover every 12 months beginning on the day after your 50th birthday PSA: 1 6 ng/mL           Hepatitis C Screening Once for adults born between 1945 and 1965  More frequently in patients at high risk for Hepatitis C Hep C Antibody: 05/08/2014        Diabetes Screening 1-2 times per year if you're at risk for diabetes or have pre-diabetes Fasting glucose: 369 mg/dL   A1C: 6 3 %        Cholesterol Screening Once every 5 years if you don't have a lipid disorder  May order more often based on risk factors  Lipid panel: 04/08/2019           Other Preventive Screenings Covered by Medicare:  1  Abdominal Aortic Aneurysm (AAA) Screening: covered once if your at risk  You're considered to be at risk if you have a family history of AAA or a male between the age of 73-68 who smoking at least 100 cigarettes in your lifetime  2  Lung Cancer Screening: covers low dose CT scan once per year if you meet all of the following conditions: (1) Age 50-69; (2) No signs or symptoms of lung cancer; (3) Current smoker or have quit smoking within the last 15 years; (4) You have a tobacco smoking history of at least 30 pack years (packs per day x number of years you smoked); (5) You get a written order from a healthcare provider  3  Glaucoma Screening: covered annually if you're considered high risk: (1) You have diabetes OR (2) Family history of glaucoma OR (3)  aged 48 and older OR (3)  American aged 72 and older  3  Osteoporosis Screening: covered every 2 years if you meet one of the following conditions: (1) Have a vertebral abnormality; (2) On glucocorticoid therapy for more than 3 months; (3) Have primary hyperparathyroidism; (4) On osteoporosis medications and need to assess response to drug therapy  5  HIV Screening: covered annually if you're between the age of 12-76  Also covered annually if you are younger than 13 and older than 72 with risk factors for HIV infection   For pregnant patients, it is covered up to 3 times per pregnancy  Immunizations:  Immunization Recommendations   Influenza Vaccine Annual influenza vaccination during flu season is recommended for all persons aged >= 6 months who do not have contraindications   Pneumococcal Vaccine (Prevnar and Pneumovax)  * Prevnar = PCV13  * Pneumovax = PPSV23 Adults 25-60 years old: 1-3 doses may be recommended based on certain risk factors  Adults 72 years old: Prevnar (PCV13) vaccine recommended followed by Pneumovax (PPSV23) vaccine  If already received PPSV23 since turning 65, then PCV13 recommended at least one year after PPSV23 dose  Hepatitis B Vaccine 3 dose series if at intermediate or high risk (ex: diabetes, end stage renal disease, liver disease)   Tetanus (Td) Vaccine - COST NOT COVERED BY MEDICARE PART B Following completion of primary series, a booster dose should be given every 10 years to maintain immunity against tetanus  Td may also be given as tetanus wound prophylaxis  Tdap Vaccine - COST NOT COVERED BY MEDICARE PART B Recommended at least once for all adults  For pregnant patients, recommended with each pregnancy  Shingles Vaccine (Shingrix) - COST NOT COVERED BY MEDICARE PART B  2 shot series recommended in those aged 48 and above     Health Maintenance Due:      Topic Date Due    Colorectal Cancer Screening  03/28/2021    Hepatitis C Screening  Completed     Immunizations Due:  There are no preventive care reminders to display for this patient  Advance Directives   What are advance directives? Advance directives are legal documents that state your wishes and plans for medical care  These plans are made ahead of time in case you lose your ability to make decisions for yourself  Advance directives can apply to any medical decision, such as the treatments you want, and if you want to donate organs  What are the types of advance directives?   There are many types of advance directives, and each state has rules about how to use them  You may choose a combination of any of the following:  · Living will: This is a written record of the treatment you want  You can also choose which treatments you do not want, which to limit, and which to stop at a certain time  This includes surgery, medicine, IV fluid, and tube feedings  · Durable power of  for healthcare [facility]): This is a written record that states who you want to make healthcare choices for you when you are unable to make them for yourself  This person, called a proxy, is usually a family member or a friend  You may choose more than 1 proxy  · Do not resuscitate (DNR) order:  A DNR order is used in case your heart stops beating or you stop breathing  It is a request not to have certain forms of treatment, such as CPR  A DNR order may be included in other types of advance directives  · Medical directive: This covers the care that you want if you are in a coma, near death, or unable to make decisions for yourself  You can list the treatments you want for each condition  Treatment may include pain medicine, surgery, blood transfusions, dialysis, IV or tube feedings, and a ventilator (breathing machine)  · Values history: This document has questions about your views, beliefs, and how you feel and think about life  This information can help others choose the care that you would choose  Why are advance directives important? An advance directive helps you control your care  Although spoken wishes may be used, it is better to have your wishes written down  Spoken wishes can be misunderstood, or not followed  Treatments may be given even if you do not want them  An advance directive may make it easier for your family to make difficult choices about your care     Weight Management   Why it is important to manage your weight:  Being overweight increases your risk of health conditions such as heart disease, high blood pressure, type 2 diabetes, and certain types of cancer  It can also increase your risk for osteoarthritis, sleep apnea, and other respiratory problems  Aim for a slow, steady weight loss  Even a small amount of weight loss can lower your risk of health problems  How to lose weight safely:  A safe and healthy way to lose weight is to eat fewer calories and get regular exercise  You can lose up about 1 pound a week by decreasing the number of calories you eat by 500 calories each day  Healthy meal plan for weight management:  A healthy meal plan includes a variety of foods, contains fewer calories, and helps you stay healthy  A healthy meal plan includes the following:  · Eat whole-grain foods more often  A healthy meal plan should contain fiber  Fiber is the part of grains, fruits, and vegetables that is not broken down by your body  Whole-grain foods are healthy and provide extra fiber in your diet  Some examples of whole-grain foods are whole-wheat breads and pastas, oatmeal, brown rice, and bulgur  · Eat a variety of vegetables every day  Include dark, leafy greens such as spinach, kale, megan greens, and mustard greens  Eat yellow and orange vegetables such as carrots, sweet potatoes, and winter squash  · Eat a variety of fruits every day  Choose fresh or canned fruit (canned in its own juice or light syrup) instead of juice  Fruit juice has very little or no fiber  · Eat low-fat dairy foods  Drink fat-free (skim) milk or 1% milk  Eat fat-free yogurt and low-fat cottage cheese  Try low-fat cheeses such as mozzarella and other reduced-fat cheeses  · Choose meat and other protein foods that are low in fat  Choose beans or other legumes such as split peas or lentils  Choose fish, skinless poultry (chicken or turkey), or lean cuts of red meat (beef or pork)  Before you cook meat or poultry, cut off any visible fat  · Use less fat and oil  Try baking foods instead of frying them   Add less fat, such as margarine, sour cream, regular salad dressing and mayonnaise to foods  Eat fewer high-fat foods  Some examples of high-fat foods include french fries, doughnuts, ice cream, and cakes  · Eat fewer sweets  Limit foods and drinks that are high in sugar  This includes candy, cookies, regular soda, and sweetened drinks  Exercise:  Exercise at least 30 minutes per day on most days of the week  Some examples of exercise include walking, biking, dancing, and swimming  You can also fit in more physical activity by taking the stairs instead of the elevator or parking farther away from stores  Ask your healthcare provider about the best exercise plan for you  © Copyright [employer] 2018 Information is for End User's use only and may not be sold, redistributed or otherwise used for commercial purposes   All illustrations and images included in CareNotes® are the copyrighted property of A D A M , Inc  or [address] [place] [address]

## 2022-01-28 NOTE — ASSESSMENT & PLAN NOTE
Patient reports that he recently had blood work done, from the [place] and will bring    I told the patient if is not able to bring the lab work and if it is not recent then to do blood work in [facility]  · CBC  · TSH  · CMP  · Lipid panel  · A1c

## 2022-01-28 NOTE — PROGRESS NOTES
Assessment and Plan:     Problem List Items Addressed This Visit        Endocrine    Type 2 diabetes mellitus without complication, without long-term current use of insulin (HCC)    Relevant Orders    Hemoglobin A1C      Other Visit Diagnoses     Persistent atrial fibrillation (HCC)    -  Primary    Relevant Medications    apixaban (Eliquis) 5 mg    metoprolol tartrate (LOPRESSOR) 25 mg tablet    Other Relevant Orders    TSH, 3rd generation with Free T4 reflex    POCT ECG    Ambulatory Referral to Cardiology    Echo complete w/ contrast if indicated    Medicare annual wellness visit, subsequent        Screening for cardiovascular condition        Relevant Orders    Lipid panel    Obesity (BMI 30 0-34  9)        Relevant Orders    Ambulatory referral to Weight Management    Comprehensive metabolic panel    CBC and Platelet    Hemoglobin A1C    Elbow pain, chronic, right        Relevant Medications    Diclofenac Sodium (VOLTAREN) 1 %    Other Relevant Orders    XR elbow 3+ vw right        BMI Counseling: Body mass index is 30 18 kg/m²  The BMI is above normal  Nutrition recommendations include decreasing portion sizes, encouraging healthy choices of fruits and vegetables, decreasing fast food intake, consuming healthier snacks, limiting drinks that contain sugar, moderation in carbohydrate intake, increasing intake of lean protein, reducing intake of saturated and trans fat and reducing intake of cholesterol  Exercise recommendations include exercising 3-5 times per week  No pharmacotherapy was ordered  Patient referred to weight management  Rationale for BMI follow-up plan is due to patient being overweight or obese  BMI Counseling: Body mass index is 30 18 kg/m²  Follow-up plan was not completed due to patient being in urgent or emergent medical situation         Preventive health issues were discussed with patient, and age appropriate screening tests were ordered as noted in patient's After Visit Summary  Personalized health advice and appropriate referrals for health education or preventive services given if needed, as noted in patient's After Visit Summary  History of Present Illness:     Patient presents for Medicare Annual Wellness visit    Patient Care Team:  Claudeen Shin, MD as PCP - General (Internal Medicine)     Problem List:     Patient Active Problem List   Diagnosis    Encounter to establish care    Encounter for colonoscopy due to history of colonic polyp    Type 2 diabetes mellitus without complication, without long-term current use of insulin ([facility] [address] )      Past Medical and Surgical History:     Past Medical History:   Diagnosis Date    Appendicitis      Past Surgical History:   Procedure Laterality Date    APPENDECTOMY      CHOLECYSTECTOMY        Family History:     Family History   Problem Relation Age of Onset    Throat cancer Father     Prostate cancer Brother     Alcohol abuse Brother       Social History:     Social History     Socioeconomic History    Marital status:      Spouse name: None    Number of children: None    Years of education: None    Highest education level: None   Occupational History    None   Tobacco Use    Smoking status: Never Smoker    Smokeless tobacco: Never Used   Vaping Use    Vaping Use: Never used   Substance and Sexual Activity    Alcohol use:  Yes     Alcohol/week: 6 0 standard drinks     Types: 6 Cans of beer per week    Drug use: Never    Sexual activity: None   Other Topics Concern    None   Social History Narrative    None     Social Determinants of Health     Financial Resource Strain: Not on file   Food Insecurity: Not on file   Transportation Needs: Not on file   Physical Activity: Not on file   Stress: Not on file   Social Connections: Not on file   Intimate Partner Violence: Not on file   Housing Stability: Not on file      Medications and Allergies:     Current Outpatient Medications   Medication Sig Dispense Refill  aspirin (ECOTRIN LOW STRENGTH) 81 mg EC tablet Take 81 mg by mouth daily      atorvastatin (LIPITOR) 20 mg tablet Take 20 mg by mouth daily      Cinnamon 500 MG capsule Take 1,200 mg by mouth daily      lisinopril (ZESTRIL) 5 mg tablet TAKE ONE TABLET BY MOUTH EVERY DAY FOR BLOOD PRESSURE/HEART      metFORMIN (GLUCOPHAGE) 500 mg tablet Take 1,000 mg by mouth      Multiple Vitamins-Minerals (CENTRUM ADULTS PO) Take 50 mg by mouth      sildenafil (VIAGRA) 100 mg tablet TAKE ONE TABLET BY MOUTH EVERY DAY AS NEEDED PRIOR TO SEXUAL ACTIVITY FOR ERECTILE DYSFUNCTION      apixaban (Eliquis) 5 mg Take 1 tablet (5 mg total) by mouth 2 (two) times a day 60 tablet 2    Diclofenac Sodium (VOLTAREN) 1 % Apply 2 g topically 4 (four) times a day 50 g 0    metoprolol tartrate (LOPRESSOR) 25 mg tablet Take 0 5 tablets (12 5 mg total) by mouth every 12 (twelve) hours 30 tablet 2    Omega-3 Fatty Acids (fish oil) 1,000 mg Take 500 mg by mouth daily  (Patient not taking: Reported on 1/28/2022 )       No current facility-administered medications for this visit  Allergies   Allergen Reactions    Other Shortness Of Breath     Cats and dogs      Immunizations:     Immunization History   Administered Date(s) Administered    COVID-19 MODERNA VACC 0 5 ML IM 02/13/2021, 03/13/2021, 01/18/2022    INFLUENZA 09/01/2018, 10/18/2021    Influenza Quadrivalent Preservative Free 3 years and older IM 10/05/2020    Influenza, Seasonal Vaccine, Quadrivalent, Adjuvanted,   5e 10/18/2021    Pneumococcal Polysaccharide PPV23 09/20/2017, 10/19/2020    Tdap 09/27/2018    Zoster Vaccine Recombinant 10/05/2020, 01/11/2021      Health Maintenance:         Topic Date Due    Colorectal Cancer Screening  03/28/2021    Hepatitis C Screening  Completed     There are no preventive care reminders to display for this patient     Medicare Health Risk Assessment:     /90 (BP Location: Left arm, Patient Position: Sitting, Cuff Size: Standard)   Pulse 60   Temp (!) 97 4 °F (36 3 °C) (Tympanic)   Ht 6' 6" (1 981 m)   Wt 118 kg (261 lb 3 2 oz)   SpO2 99%   BMI 30 18 kg/m²      Last Medicare Wellness visit information reviewed, patient interviewed and updates made to the record today  Health Risk Assessment:   Patient rates overall health as very good  Patient feels that their physical health rating is same  Patient is satisfied with their life  Eyesight was rated as same  Hearing was rated as same  Patient feels that their emotional and mental health rating is same  Patients states they are never, rarely angry  Patient states they are sometimes unusually tired/fatigued  Pain experienced in the last 7 days has been some  Patient's pain rating has been 5/10  Patient states that he has experienced weight loss or gain in last 6 months  Fall Risk Screening: In the past year, patient has experienced: no history of falling in past year      Home Safety:  Patient does not have trouble with stairs inside or outside of their home  Patient has working smoke alarms and has working carbon monoxide detector  Home safety hazards include: none  Nutrition:   Current diet is Diabetic  Medications:   Patient is not currently taking any over-the-counter supplements  Patient is able to manage medications  Activities of Daily Living (ADLs)/Instrumental Activities of Daily Living (IADLs):   Walk and transfer into and out of bed and chair?: Yes  Dress and groom yourself?: Yes    Bathe or shower yourself?: Yes    Feed yourself?  Yes  Do your laundry/housekeeping?: Yes  Manage your money, pay your bills and track your expenses?: Yes  Make your own meals?: Yes    Do your own shopping?: Yes    Previous Hospitalizations:   Any hospitalizations or ED visits within the last 12 months?: No      Advance Care Planning:   Living will: No    Durable POA for healthcare: No    Advanced directive: No      Cognitive Screening:   Provider or family/friend/caregiver concerned regarding cognition?: No    PREVENTIVE SCREENINGS      Cardiovascular Screening:    General: Screening Current      Diabetes Screening:     General: Screening Not Indicated and History Diabetes      Colorectal Cancer Screening:     General: Screening Current      Prostate Cancer Screening:    General: Patient Declines      Osteoporosis Screening:    General: Risks and Benefits Discussed and Screening Not Indicated      Abdominal Aortic Aneurysm (AAA) Screening:    Risk factors include: age between 73-67 yo        General: Screening Not Indicated and Risks and Benefits Discussed      Lung Cancer Screening:     General: Screening Not Indicated      Hepatitis C Screening:    General: Screening Current    Screening, Brief Intervention, and Referral to Treatment (SBIRT)    Screening  Typical number of drinks in a day: 1  Typical number of drinks in a week: 3  Interpretation: Low risk drinking behavior  AUDIT-C Screenin) How often did you have a drink containing alcohol in the past year? 2 to 4 times a month  2) How many drinks did you have on a typical day when you were drinking in the past year? 1 to 2  3) How often did you have 6 or more drinks on one occasion in the past year? never    AUDIT-C Score: 2  Interpretation: Score 0-3 (male): Negative screen for alcohol misuse    Single Item Drug Screening:  How often have you used an illegal drug (including marijuana) or a prescription medication for non-medical reasons in the past year? never    Single Item Drug Screen Score: 0  Interpretation: Negative screen for possible drug use disorder    Brief Intervention  Alcohol & drug use screenings were reviewed  No concerns regarding substance use disorder identified  Other Counseling Topics:   Car/seat belt/driving safety, skin self-exam, sunscreen and calcium and vitamin D intake and regular weightbearing exercise         Karen Evans MD

## 2022-01-28 NOTE — ASSESSMENT & PLAN NOTE
Patient reported has been having right elbow pain for more than 6 months, pain is more with active range of motion rather than passive range of motion  No point tenderness seen on tendon area and joint area  · Voltaren gel p r n    · X-ray of the right elbow rule out fracture

## 2022-01-28 NOTE — PROGRESS NOTES
Assessment/Plan:    Persistent atrial fibrillation ([facility] )  Patient was seen on the GI office and was told that he has irregular heartbeat and will need an ECG and to call PCP  When I saw the patient patient regular heart rate  EKG was done and showed Afib, rate controlled  Patient denies chest pain, shortness of breath, palpitation  Chads Vasc score of 2    · Echo  · Metoprolol 12 5 mg b i d  · Will start the patient on Eliquis 5 mg b i d , after risks and benefits was discussed, told the patient that he will be taking this medication, and if he falls and hit his head to go to the emergency room due to increased risk of bleeding in the head  I also discussed with the patient to check with his insurance if they will pay for this, if not to call our office since will need to start him on a different medication such as Coumadin  · Cardiology consult  · TSH    Elbow pain, chronic, right  Patient reported has been having right elbow pain for more than 6 months, pain is more with active range of motion rather than passive range of motion  No point tenderness seen on tendon area and joint area  · Voltaren gel p r n  · X-ray of the right elbow rule out fracture    Medicare annual wellness visit, subsequent  Patient reports that he recently had blood work done, from the [place] and will bring    I told the patient if is not able to bring the lab work and if it is not recent then to do blood work in [facility]  · CBC  · TSH  · CMP  · Lipid panel  · A1c       Diagnoses and all orders for this visit:    Persistent atrial fibrillation ([facility] )  -     TSH, 3rd generation with Free T4 reflex; Future  -     POCT ECG  -     apixaban (Eliquis) 5 mg; Take 1 tablet (5 mg total) by mouth 2 (two) times a day  -     Ambulatory Referral to Cardiology; Future  -     metoprolol tartrate (LOPRESSOR) 25 mg tablet;  Take 0 5 tablets (12 5 mg total) by mouth every 12 (twelve) hours  -     Echo complete w/ contrast if indicated; Future    Medicare annual wellness visit, subsequent    Screening for cardiovascular condition  -     Lipid panel; Future    Obesity (BMI 30 0-34 9)  -     Ambulatory referral to Weight Management; Future  -     Comprehensive metabolic panel; Future  -     CBC and Platelet; Future  -     Hemoglobin A1C; Future    Elbow pain, chronic, right  -     XR elbow 3+ vw right; Future  -     Diclofenac Sodium (VOLTAREN) 1 %; Apply 2 g topically 4 (four) times a day    Type 2 diabetes mellitus without complication, without long-term current use of insulin ([facility])  -     Hemoglobin A1C; Future    Other orders  -     Cinnamon 500 MG capsule; Take 1,200 mg by mouth daily          Subjective:      Patient ID: Patrick Patton is a 77 y o  male  Patient was originally scheduled for Medicare annual wellness however patient was found out to have irregular heart rate, he was also having right elbow pain that has been ongoing for 6 months, more painful during active range of motion rather than passive range of motion  No point tenderness felt  The following portions of the patient's history were reviewed and updated as appropriate: allergies, current medications, past family history, past medical history, past social history, past surgical history and problem list     Review of Systems   Musculoskeletal: Positive for joint swelling and myalgias  Objective:      /90 (BP Location: Left arm, Patient Position: Sitting, Cuff Size: Standard)   Pulse 60   Temp (!) 97 4 °F (36 3 °C) (Tympanic)   Ht 6' 6" (1 981 m)   Wt 118 kg (261 lb 3 2 oz)   SpO2 99%   BMI 30 18 kg/m²          Physical Exam  Vitals reviewed  HENT:      Head: Normocephalic  Nose: Nose normal       Mouth/Throat:      Mouth: Mucous membranes are moist    Eyes:      Extraocular Movements: Extraocular movements intact  Conjunctiva/sclera: Conjunctivae normal       Pupils: Pupils are equal, round, and reactive to light  Cardiovascular:      Rate and Rhythm: Normal rate  Rhythm irregular  Pulses: Pulses are weak  Dorsalis pedis pulses are 1+ on the right side and 1+ on the left side  Posterior tibial pulses are 1+ on the right side and 1+ on the left side  Heart sounds: Normal heart sounds  Pulmonary:      Effort: Pulmonary effort is normal       Breath sounds: Normal breath sounds  Abdominal:      General: Abdomen is flat  Bowel sounds are normal       Palpations: Abdomen is soft  Musculoskeletal:         General: Normal range of motion  Cervical back: Normal range of motion  Right lower leg: No edema  Left lower leg: No edema  Feet:      Right foot:      Skin integrity: No ulcer, skin breakdown, erythema, warmth, callus or dry skin  Left foot:      Skin integrity: No ulcer, skin breakdown, erythema, warmth, callus or dry skin  Skin:     General: Skin is warm  Capillary Refill: Capillary refill takes less than 2 seconds  Neurological:      General: No focal deficit present  Mental Status: He is alert and oriented to person, place, and time  Answers for HPI/ROS submitted by the patient on 1/23/2022  How would you rate your overall health?: very good  Compared to last year, how is your physical health?: same  In general, how satisfied are you with your life?: satisfied  Compared to last year, how is your eyesight?: same  Compared to last year, how is your hearing?: same  Compared to last year, how is your emotional/mental health?: same  How often is anger a problem for you?: never, rarely  How often do you feel unusually tired/fatigued?: sometimes  In the past 7 days, how much pain have you experienced?: some  If you answered "some" or "a lot", please rate the severity of your pain on a scale of 1 to 10 (1 being the least severe pain and 10 being the most intense pain)  : 5/10  In the past 6 months, have you lost or gained 10 pounds without trying?: Yes  One or more falls in the last year: No  Do you have trouble with the stairs inside or outside your home?: No  Does your home have working smoke alarms?: Yes  Does your home have a carbon monoxide monitor?: Yes  Which safety hazards (if any) have you experienced in your home? Please select all that apply : none  How would you describe your current diet? Please select all that apply : Diabetic  In addition to prescription medications, are you taking any over-the-counter supplements?: No  Can you manage your medications?: Yes  Are you currently taking any opioid medications?: No  Can you walk and transfer into and out of your bed and chair?: Yes  Can you dress and groom yourself?: Yes  Can you bathe or shower yourself?: Yes  Can you feed yourself?: Yes  Can you do your laundry/ housekeeping?: Yes  Can you manage your money, pay your bills, and track your expenses?: Yes  Can you make your own meals?: Yes  Can you do your own shopping?: Yes  Within the last 12 months, have you had any hospitalizations or Emergency Department visits?: No  Do you have a living will?: No  Do you have a Durable POA (Power of ) for healthcare decisions?: No  Do you have an Advanced Directive for end of life decisions?: No  How often have you used an illegal drug (including marijuana) or a prescription medication for non-medical reasons in the past year?: never  What is the typical number of drinks you consume in a day?: 1  What is the typical number of drinks you consume in a week?: 3  How often did you have a drink containing alcohol in the past year?: 2 to 4 times a month  How many drinks did you have on a typical day  when you were drinking in the past year?: 1 to 2  How often did you have 6 or more drinks on one occasion in the past year?: never    Patient's shoes and socks removed  Right Foot/Ankle   Right Foot Inspection  Skin Exam: skin normal and skin intact   No dry skin, no warmth, no callus, no erythema, no maceration, no abnormal color, no pre-ulcer, no ulcer and no callus  Toe Exam: ROM and strength within normal limits  Sensory   Vibration: intact  Proprioception: intact  Monofilament testing: intact    Vascular  Capillary refills: < 3 seconds  The right DP pulse is 1+  The right PT pulse is 1+  Right Toe  - Comprehensive Exam  Ecchymosis: none  Arch: normal  Hammertoes: absent  Claw Toes: absent  Swelling: none   Tenderness: none         Left Foot/Ankle  Left Foot Inspection  Skin Exam: skin normal and skin intact  No dry skin, no warmth, no erythema, no maceration, normal color, no pre-ulcer, no ulcer and no callus  Toe Exam: ROM and strength within normal limits  Sensory   Vibration: intact  Proprioception: intact  Monofilament testing: intact    Vascular  Capillary refills: < 3 seconds  The left DP pulse is 1+  The left PT pulse is 1+       Left Toe  - Comprehensive Exam  Ecchymosis: none  Arch: normal  Hammertoes: absent  Claw toes: absent  Swelling: none   Tenderness: none           Assign Risk Category  No deformity present  No loss of protective sensation  Weak pulses  Risk: 0   f

## 2022-01-28 NOTE — LETTER
January 28, 2022     Patient: Christiano Garza   YOB: 1955   Date of Visit: 1/28/2022       To Whom it May Concern:    Brice Llamas is under my professional care  He was seen in my office on 1/28/2022  He may return to work on 1/31/2022  If you have any questions or concerns, please don't hesitate to call           Sincerely,          Raghav Griffin MD        CC:   No Recipients

## 2022-01-28 NOTE — LETTER
January 28, 2022     Patient: Naida Carlin   YOB: 1955   Date of Visit: 1/28/2022       To Whom it May Concern:    Timmyse Julian is under my professional care  He was seen in my office on 1/28/2022  He may return to work on 1/31/2022  If you have any questions or concerns, please don't hesitate to call           Sincerely,          Lenora Malagon MD        CC: No Recipients

## 2022-02-01 ENCOUNTER — TELEPHONE (OUTPATIENT)
Dept: INTERNAL MEDICINE CLINIC | Facility: CLINIC | Age: 67
End: 2022-02-01

## 2022-02-01 NOTE — TELEPHONE ENCOUNTER
Korey Fritz has called the  [facility] office, from the [employer] office, in regards to his appointment on 01/28/2022  Igor Jasso stated he was prescribed medications that were not covered by medicare, but were covered by the [facility]  Had asked for the medications to be faxed over to the [facility] instead, stating the fax number is [phone]  Igor Mortezaosiris stated he also has recent blood work from the [facility] and would like to know what to do with the results  Informed Igor Jasso to have the [employer] office scan the results into his chart for the  providers to review  Igor Jasso was thankful for the information and ended the call

## 2022-02-09 ENCOUNTER — TELEPHONE (OUTPATIENT)
Dept: INTERNAL MEDICINE CLINIC | Facility: CLINIC | Age: 67
End: 2022-02-09

## 2022-02-09 NOTE — TELEPHONE ENCOUNTER
Pt called because he is having a colonoscopy on Friday    he was wondering if any of his medications need to be stopped  He has not started Eliquis and wont start it until after the procedure  Spoke to Dr Gracia Iqbal he said he does not need to stop any   Pt aware

## 2022-02-10 ENCOUNTER — TELEPHONE (OUTPATIENT)
Dept: GASTROENTEROLOGY | Facility: AMBULARY SURGERY CENTER | Age: 67
End: 2022-02-10

## 2022-02-10 ENCOUNTER — TELEPHONE (OUTPATIENT)
Dept: INTERNAL MEDICINE CLINIC | Facility: CLINIC | Age: 67
End: 2022-02-10

## 2022-02-10 NOTE — TELEPHONE ENCOUNTER
Cory Pulliam has called the [facility] [place] office in regards to his medications  Luci Gan stated he was recently prescribed omeprazole by the [facility] doctor Dr Catia Gan stated he is wishing to speak with the cardiologist office  Informed Luci Gan he had yet to see the cardiologist office and may not be able to address his medication concerns

## 2022-02-11 ENCOUNTER — ANESTHESIA EVENT (OUTPATIENT)
Dept: GASTROENTEROLOGY | Facility: AMBULARY SURGERY CENTER | Age: 67
End: 2022-02-11

## 2022-02-11 ENCOUNTER — HOSPITAL ENCOUNTER (OUTPATIENT)
Dept: GASTROENTEROLOGY | Facility: AMBULARY SURGERY CENTER | Age: 67
Setting detail: OUTPATIENT SURGERY
Discharge: HOME | End: 2022-02-11
Attending: INTERNAL MEDICINE | Admitting: INTERNAL MEDICINE
Payer: MEDICARE

## 2022-02-11 ENCOUNTER — ANESTHESIA (OUTPATIENT)
Dept: GASTROENTEROLOGY | Facility: AMBULARY SURGERY CENTER | Age: 67
End: 2022-02-11

## 2022-02-11 VITALS
OXYGEN SATURATION: 96 % | DIASTOLIC BLOOD PRESSURE: 75 MMHG | HEART RATE: 98 BPM | RESPIRATION RATE: 12 BRPM | SYSTOLIC BLOOD PRESSURE: 126 MMHG | TEMPERATURE: 97.5 F

## 2022-02-11 DIAGNOSIS — Z86.010 HISTORY OF COLON POLYPS: ICD-10-CM

## 2022-02-11 PROCEDURE — 45385 COLONOSCOPY W/LESION REMOVAL: CPT | Performed by: INTERNAL MEDICINE

## 2022-02-11 PROCEDURE — 88305 TISSUE EXAM BY PATHOLOGIST: CPT | Performed by: PATHOLOGY

## 2022-02-11 PROCEDURE — 45380 COLONOSCOPY AND BIOPSY: CPT | Performed by: INTERNAL MEDICINE

## 2022-02-11 RX ORDER — PROPOFOL 10 MG/ML
INJECTION, EMULSION INTRAVENOUS AS NEEDED
Status: DISCONTINUED | OUTPATIENT
Start: 2022-02-11 | End: 2022-02-11

## 2022-02-11 RX ORDER — PROPOFOL 10 MG/ML
INJECTION, EMULSION INTRAVENOUS CONTINUOUS PRN
Status: DISCONTINUED | OUTPATIENT
Start: 2022-02-11 | End: 2022-02-11

## 2022-02-11 RX ORDER — SODIUM CHLORIDE, SODIUM LACTATE, POTASSIUM CHLORIDE, CALCIUM CHLORIDE 600; 310; 30; 20 MG/100ML; MG/100ML; MG/100ML; MG/100ML
INJECTION, SOLUTION INTRAVENOUS CONTINUOUS PRN
Status: DISCONTINUED | OUTPATIENT
Start: 2022-02-11 | End: 2022-02-11

## 2022-02-11 RX ADMIN — PROPOFOL 120 MCG/KG/MIN: 10 INJECTION, EMULSION INTRAVENOUS at 08:12

## 2022-02-11 RX ADMIN — Medication 40 MG: at 08:16

## 2022-02-11 RX ADMIN — PROPOFOL 100 MG: 10 INJECTION, EMULSION INTRAVENOUS at 08:12

## 2022-02-11 RX ADMIN — SODIUM CHLORIDE, SODIUM LACTATE, POTASSIUM CHLORIDE, AND CALCIUM CHLORIDE: .6; .31; .03; .02 INJECTION, SOLUTION INTRAVENOUS at 08:10

## 2022-02-11 NOTE — H&P
History and Physical -  [facility]  Eagle Burk 77 y o  male MRN: [id number]        HPI: 51-year-old male history of diabetes mellitus, hypertension, colon polyps  Regular bowel movements  Historical Information   Past Medical History:   Diagnosis Date    Appendicitis     Atrial fibrillation ([facility] )     Colon polyp     Diabetes mellitus ([facility] )     Hyperlipidemia     Hypertension      Past Surgical History:   Procedure Laterality Date    APPENDECTOMY      CHOLECYSTECTOMY      COLONOSCOPY       Social History   Social History     Substance and Sexual Activity   Alcohol Use Yes     Social History     Substance and Sexual Activity   Drug Use Never     Social History     Tobacco Use   Smoking Status Never Smoker   Smokeless Tobacco Never Used     Family History   Problem Relation Age of Onset    Throat cancer Father     Prostate cancer Brother     Alcohol abuse Brother        Meds/Allergies     (Not in a hospital admission)      Allergies   Allergen Reactions    Other Shortness Of Breath     Cats and dogs       Objective     Blood pressure 145/91, pulse 101, temperature (!) 96 4 °F (35 8 °C), temperature source Temporal, resp  rate 18, SpO2 98 %      PHYSICAL EXAM:    Gen: NAD  CV: S1 & S2 normal, RRR  CHEST: Clear to auscultate  ABD: soft, NT/ND, good bowel sounds  EXT: no edema    ASSESSMENT:     History of colon polyps    PLAN:    Colonoscopy

## 2022-02-11 NOTE — DISCHARGE INSTRUCTIONS
Colonoscopy   WHAT YOU NEED TO KNOW:   A colonoscopy is a procedure to examine the inside of your colon (intestine) with a scope  Polyps or tissue growths may have been removed during your colonoscopy  It is normal to feel bloated and to have some abdominal discomfort  You should be passing gas  If you have hemorrhoids or you had polyps removed, you may have a small amount of bleeding  DISCHARGE INSTRUCTIONS:   Seek care immediately if:    You have sudden, severe abdominal pain   You have problems swallowing   You have a large amount of black, sticky bowel movements or blood in your bowel movements   You have sudden trouble breathing   You feel weak, lightheaded, or faint or your heart beats faster than normal for you  Contact your healthcare provider if:    You have a fever and chills   You have nausea or are vomiting   Your abdomen is bloated or feels full and hard   You have abdominal pain   You have black, sticky bowel movements or blood in your bowel movements   You have not had a bowel movement for 3 days after your procedure   You have rash or hives   You have questions or concerns about your procedure  Activity:    Do not lift, strain, or run for 24 hours after your procedure   Rest after your procedure  You have been given medicine to relax you  Do not drive or make important decisions until the day after your procedure  Return to your normal activity as directed   Relieve gas and discomfort from bloating by lying on your right side with a heating pad on your abdomen  You may need to take short walks to help the gas move out  Eat small meals until bloating is relieved  Follow up with your healthcare provider as directed: Write down your questions so you remember to ask them during your visits  If you take a blood thinner, please review the specific instructions from your endoscopist about when you should resume it   These can be found in the Recommendation and Your Medication list sections of this After Visit Summary  Colorectal Polyps   WHAT YOU NEED TO KNOW:   What are colorectal polyps? Colorectal polyps are small growths of tissue in the lining of the colon and rectum  Most polyps are usually benign (not cancer)  Certain types of polyps, called adenomatous polyps, may turn into cancer  What increases my risk for colorectal polyps? The exact cause of colorectal polyps is unknown  The following may increase your risk:  · Older age    · Foods high in fat and low in fiber    · Family history of polyps    · Intestinal diseases, such as Crohn disease or ulcerative colitis    · Smoking cigarettes or drinking alcohol    · Lack of physical activity, such as exercise    · Obesity    What are the signs and symptoms of colorectal polyps? · Blood in your bowel movement or bleeding from the rectum    · Change in bowel movement habits, such as diarrhea or constipation    · Abdominal pain    What do I need to know about colorectal polyp screening and diagnosis? Screening means you are checked for polyps that may be cancer, even if you do not have signs or symptoms  Screening is recommended starting at age 48 and continuing to age 76 if you are at average risk  Your healthcare provider may suggest screening starting at age 39  Screening may start before you are 45 or continue after you are 75 if your risk is high  Your provider will tell you how often to get screened  Timing depends on the type of screening and if polyps are found  Timing also depends on your age and if you are at increased risk for cancer  Screening may be recommended every 1, 2, 5, or 10 years  Your healthcare provider will need to test polyps to find out if they are cancer  Any of the following may be used to find polyps:  · A digital rectal exam  means your provider will use a finger to check for polyps  · A barium enema  is an x-ray of the colon   A tube is put into your anus, and a liquid called barium is put through the tube  Barium is used so that healthcare providers can see your colon better on the x-ray film  · A virtual colonoscopy  is a CT scan that takes pictures of the inside of your colon and rectum  A small, flexible tube is put into your rectum and air or carbon dioxide (gas) is used to expand your colon  This lets healthcare providers clearly see your colon and any polyps on a monitor  · Colonoscopy or sigmoidoscopy  are procedures to help your provider see the inside of your colon  He or she will use a flexible tube with a small light and camera on the end  During a sigmoidoscopy, your provider will only look at rectum and lower colon  During a colonoscopy, he or she will look at the full length of your colon  A small amount of tissue may be removed from your colon for tests  How are colorectal polyps treated? Small, benign polyps may not need treatment  Your healthcare provider will check the polyp over time to make sure it is not changing  Polyps that are cancer may be removed with one of the following:  · A polypectomy  is a minimally invasive procedure to remove a polyp during a colonoscopy or sigmoidoscopy  Your healthcare provider may need to remove the polyp with a laparoscope  Laparoscopy is done by inserting a small, flexible scope into incisions made on your abdomen  · Surgery  may be needed to remove large or deep polyps that cannot be removed in a polypectomy  Tissues or lymph nodes near a polyp may also be removed  This helps stop cancer from spreading  What can I do to lower my risk for colorectal polyps? · Eat a variety of healthy foods  Healthy foods include fruit, vegetables, whole-grain breads, low-fat dairy products, beans, lean meat, and fish  Ask if you need to be on a special diet  · Maintain a healthy weight  Ask your healthcare provider what a healthy weight is for you   Ask him or her to help with a weight loss plan if you are overweight  · Exercise as directed  Begin to exercise slowly and do more as you get stronger  Talk with your healthcare provider before you start an exercise program          · Limit alcohol  Your risk for polyps increases the more you drink  · Do not smoke  Nicotine and other chemicals in cigarettes and cigars increase your risk for polyps  Ask your healthcare provider for information if you currently smoke and need help to quit  E-cigarettes or smokeless tobacco still contain nicotine  Talk to your healthcare provider before you use these products  Where can I find more information? · Sandro Kennedy ([facility])  [address], [place] [address]  Phone: [phone]  Web Address: Mila Adame  [facility] gov    Call your local emergency number (911 in the [address]) if:   · You have sudden shortness of breath  · You have a fast heart rate, fast breathing, or are too dizzy to stand up  When should I seek immediate care? · You have severe abdominal pain  · You see blood in your bowel movement  When should I call my doctor? · You have a fever  · You have chills, a cough, or feel weak and achy  · You have abdominal pain that does not go away or gets worse after you take medicine  · Your abdomen is swollen  · You are losing weight without trying  · You have questions or concerns about your condition or care  CARE AGREEMENT:   You have the right to help plan your care  Learn about your health condition and how it may be treated  Discuss treatment options with your healthcare providers to decide what care you want to receive  You always have the right to refuse treatment  The above information is an  only  It is not intended as medical advice for individual conditions or treatments   Talk to your doctor, nurse or pharmacist before following any medical regimen to see if it is safe and effective for you  © Copyright [facility] 2021 Information is for End User's use only and may not be sold, redistributed or otherwise used for commercial purposes   All illustrations and images included in CareNotes® are the copyrighted property of A D A M , Inc  or [employer] Pleite[employer]

## 2022-02-11 NOTE — ANESTHESIA PREPROCEDURE EVALUATION
Procedure:  COLONOSCOPY    Relevant Problems   ANESTHESIA (within normal limits)      CARDIO   (+) Persistent atrial fibrillation (HCC)      ENDO   (+) Type 2 diabetes mellitus without complication, without long-term current use of insulin (HCC)      NEURO/PSYCH   (+) Elbow pain, chronic, right   (+) Encounter for colonoscopy due to history of colonic polyp        Physical Exam    Airway    Mallampati score: I  TM Distance: >3 FB  Neck ROM: full     Dental   No notable dental hx     Cardiovascular  Rate: normal,     Pulmonary  Pulmonary exam normal     Other Findings        Anesthesia Plan  ASA Score- 3     Anesthesia Type- IV sedation with anesthesia with ASA Monitors  Additional Monitors:   Airway Plan:     Comment: I discussed risks (reviewed with patient on the anesthesia consent form), benefits and alternatives of monitored sedation including the possibility under sedation to have recall or mild discomfort          Plan Factors-    Chart reviewed  Patient summary reviewed  Induction- intravenous  Postoperative Plan-     Informed Consent- Anesthetic plan and risks discussed with patient  I personally reviewed this patient with the CRNA  Discussed and agreed on the Anesthesia Plan with the TAVO Leon

## 2022-02-11 NOTE — ANESTHESIA POSTPROCEDURE EVALUATION
Post-Op Assessment Note    CV Status:  Stable  Pain Score: 0    Pain management: adequate     Mental Status:  Alert and sleepy   Hydration Status:  Euvolemic   PONV Controlled:  Controlled   Airway Patency:  Patent      Post Op Vitals Reviewed: Yes      Staff: CRNA         No complications documented      /68 (02/11/22 0834)    Temp 97 5 °F (36 4 °C) (02/11/22 0834)    Pulse 99 (02/11/22 0834)   Resp 14 (02/11/22 0834)    SpO2 96 % (02/11/22 0834)

## 2022-02-11 NOTE — TELEPHONE ENCOUNTER
Patient called the office today with questions about his medications  He underwent a colonoscopy today and had 2 polyps removed  He has a diagnosis of recently diagnosed atrial fibrillation and is on Eliquis which was held  In addition he is also on metoprolol, diclofenac sodium, aspirin  Patient states that he gets his medications from the [place] and was also started on omeprazole  Patient wanted to know what medications to continue and which ones to stop  Patient is rate controlled and has been cleared to restart Eliquis from [facility] by GI  Patient advised to restart his metoprolol  Since he is on Eliquis he was asked to discontinue aspirin  Patient denies any pain anywhere-hence there was no indication for him to continue diclofenac sodium  He was asked to discontinue that  Since he is not on aspirin or diclofenac-do not think he needs omeprazole  In summary  Patient is to continue Eliquis and metoprolol    Patient asked to discontinue aspirin, diclofenac sodium, omeprazole  If he develops any bleeding episodes he is to discontinue Eliquis and call to the ER asap    Has an appointment to see Cardiology on March 18

## 2022-03-18 ENCOUNTER — HOSPITAL ENCOUNTER (OUTPATIENT)
Dept: NON INVASIVE DIAGNOSTICS | Facility: CLINIC | Age: 67
Discharge: HOME | End: 2022-03-18
Payer: MEDICARE

## 2022-03-18 VITALS
HEART RATE: 70 BPM | BODY MASS INDEX: 30.2 KG/M2 | DIASTOLIC BLOOD PRESSURE: 75 MMHG | WEIGHT: 261 LBS | HEIGHT: 78 IN | SYSTOLIC BLOOD PRESSURE: 126 MMHG

## 2022-03-18 DIAGNOSIS — I48.19 PERSISTENT ATRIAL FIBRILLATION (HCC): ICD-10-CM

## 2022-03-18 LAB
AORTIC ROOT: 3 CM
APICAL FOUR CHAMBER EJECTION FRACTION: 69 %
ASCENDING AORTA: 3.9 CM (ref 2.3–3.44)
FRACTIONAL SHORTENING: 30 % (ref 28–44)
INTERVENTRICULAR SEPTUM IN DIASTOLE (PARASTERNAL SHORT AXIS VIEW): 1.2 CM
INTERVENTRICULAR SEPTUM: 1.2 CM (ref 0.59–1.1)
LAAS-AP2: 31.8 CM2
LAAS-AP4: 27.8 CM2
LEFT ATRIUM AREA SYSTOLE SINGLE PLANE A4C: 26.3 CM2
LEFT ATRIUM SIZE: 4.6 CM
LEFT INTERNAL DIMENSION IN SYSTOLE: 3.2 CM (ref 8.08–12.25)
LEFT VENTRICULAR INTERNAL DIMENSION IN DIASTOLE: 4.6 CM (ref 13.83–20.63)
LEFT VENTRICULAR POSTERIOR WALL IN END DIASTOLE: 1.2 CM (ref 0.57–1.09)
LEFT VENTRICULAR STROKE VOLUME: 54 ML
LVSV (TEICH): 54 ML
RIGHT ATRIUM AREA SYSTOLE A4C: 19.5 CM2
RIGHT VENTRICLE ID DIMENSION: 3.4 CM
SL CV LEFT ATRIUM LENGTH A2C: 6.3 CM
SL CV LV EF: 60
SL CV PED ECHO LEFT VENTRICLE DIASTOLIC VOLUME (MOD BIPLANE) 2D: 96 ML
SL CV PED ECHO LEFT VENTRICLE SYSTOLIC VOLUME (MOD BIPLANE) 2D: 42 ML
TR MAX PG: 17 MMHG
TR PEAK VELOCITY: 2.1 M/S
TRICUSPID VALVE PEAK REGURGITATION VELOCITY: 2.06 M/S
Z-SCORE OF ASCENDING AORTA: 3.57
Z-SCORE OF INTERVENTRICULAR SEPTUM IN END DIASTOLE: 2.7
Z-SCORE OF LEFT VENTRICULAR DIMENSION IN END DIASTOLE: -13.01
Z-SCORE OF LEFT VENTRICULAR DIMENSION IN END SYSTOLE: -8.97
Z-SCORE OF LEFT VENTRICULAR POSTERIOR WALL IN END DIASTOLE: 2.81

## 2022-03-18 PROCEDURE — 93306 TTE W/DOPPLER COMPLETE: CPT | Performed by: INTERNAL MEDICINE

## 2022-03-18 PROCEDURE — 93306 TTE W/DOPPLER COMPLETE: CPT

## 2022-03-31 ENCOUNTER — CONSULT (OUTPATIENT)
Dept: CARDIOLOGY CLINIC | Facility: CLINIC | Age: 67
End: 2022-03-31
Payer: MEDICARE

## 2022-03-31 VITALS
HEIGHT: 78 IN | BODY MASS INDEX: 29.85 KG/M2 | HEART RATE: 65 BPM | SYSTOLIC BLOOD PRESSURE: 120 MMHG | OXYGEN SATURATION: 97 % | DIASTOLIC BLOOD PRESSURE: 80 MMHG | WEIGHT: 258 LBS

## 2022-03-31 DIAGNOSIS — R06.00 DYSPNEA ON EXERTION: ICD-10-CM

## 2022-03-31 DIAGNOSIS — E78.2 MIXED DYSLIPIDEMIA: ICD-10-CM

## 2022-03-31 DIAGNOSIS — E11.9 TYPE 2 DIABETES MELLITUS WITHOUT COMPLICATION, WITHOUT LONG-TERM CURRENT USE OF INSULIN (HCC): ICD-10-CM

## 2022-03-31 DIAGNOSIS — I10 ESSENTIAL HYPERTENSION: Primary | ICD-10-CM

## 2022-03-31 DIAGNOSIS — I48.19 PERSISTENT ATRIAL FIBRILLATION (HCC): ICD-10-CM

## 2022-03-31 PROBLEM — R06.09 DYSPNEA ON EXERTION: Status: ACTIVE | Noted: 2022-03-31

## 2022-03-31 PROCEDURE — 93000 ELECTROCARDIOGRAM COMPLETE: CPT | Performed by: INTERNAL MEDICINE

## 2022-03-31 PROCEDURE — 99205 OFFICE O/P NEW HI 60 MIN: CPT | Performed by: INTERNAL MEDICINE

## 2022-03-31 RX ORDER — OMEPRAZOLE 20 MG/1
CAPSULE, DELAYED RELEASE ORAL
COMMUNITY
Start: 2022-03-22

## 2022-03-31 NOTE — ASSESSMENT & PLAN NOTE
Diagnosed 6 months ago incidentally on exam  Mildly symptomatic  Rate controlled  EKG today shows persistent atrial fibrillation with controlled ventricular rate  Continue anticoagulation  Avoid any NSAIDs or aspirin  Discussed in detail the options of ablation, cardioversion, and ongoing medical therapy with beta-blockers and anticoagulation  Since his symptoms are only mild at the current time and heart rate is controlled, we have elected the option of medical therapy    Pharmacologic nuclear stress test planned  for further risk stratification

## 2022-03-31 NOTE — PATIENT INSTRUCTIONS
Nuclear stress testing planned for further cardiac risk assessment  Atrial Fibrillation management:  -Continue current cardiac medications  -Maintain ideal body weight (weight loss helps lower A fib risk if you are overweight)  -Regular walking and increased physical activity is beneficial   -Eliminate alcohol intake   -Avoid smoking or recreational drug use  -Use CPAP if you have sleep apnea  -Optimal control of  blood pressure and blood sugars  A-fib (Atrial Fibrillation) or Atrial flutter  WHAT YOU NEED TO KNOW:   A-fib may come and go, or it may be a long-term condition  A-fib can cause blood clots, stroke, or heart failure  These conditions may become life-threatening  It is important to treat and manage a-fib to help prevent a blood clot, stroke, or heart failure  Medicines: You may need any of the following:  · Heart medicines  help control your heart rate or rhythm  You may need more than one medicine to treat your symptoms  · Blood thinners: (Eliquis, Xarelto, Pradaxa or Warfarin/coumadin)  help prevent blood clots  Clots can cause strokes, heart attacks, and death  The following are general safety guidelines to follow while you are taking a blood thinner:    ? Watch for bleeding and bruising while you take blood thinners  Watch for bleeding from your gums or nose  Watch for blood in your urine and bowel movements  Use a soft washcloth on your skin, and a soft toothbrush to brush your teeth  This can keep your skin and gums from bleeding  If you shave, use an electric shaver  Do not play contact sports  ? Tell your dentist and other healthcare providers that you take a blood thinner  Wear a bracelet or necklace that says you take this medicine  ? Do not start or stop any other medicines unless your healthcare provider tells you to  Many medicines cannot be used with blood thinners  ? Take your blood thinner exactly as prescribed by your healthcare provider   Do not skip does or take less than prescribed  Tell your provider right away if you forget to take your blood thinner, or if you take too much  ? The following are things you should be aware of if you take warfarin:     § Foods and medicines can affect the amount of warfarin in your blood  Do not make major changes to your diet while you take warfarin  Warfarin works best when you eat about the same amount of vitamin K every day  Vitamin K is found in green leafy vegetables and certain other foods  Ask for more information about what to eat when you are taking warfarin  § You will need to see your healthcare provider for follow-up visits when you are on warfarin  You will need regular blood tests  These tests are used to decide how much medicine you need  · Take your medicine as directed  Keep a list of the medicines, vitamins, and herbs you take  Include the amounts, and when and why you take them  Bring the list or the pill bottles to follow-up visits  Carry your medicine list with you in case of an emergency  Manage A-fib:   · Know your target heart rate ()  Learn how to check your pulse and monitor your heart rate  Count your pulse for one minute to get an accurate reading  · Avoid alcohol  Alcohol can make a-fib hard to manage  · Do not smoke  Nicotine can cause heart damage and make it more difficult to manage your a-fib  Do not use e-cigarettes or smokeless tobacco in place of cigarettes or to help you quit  They still contain nicotine  Ask your healthcare provider for information if you currently smoke and need help quitting  · Eat heart-healthy foods  Heart healthy foods will help keep your cholesterol low  These include fruits, vegetables, whole-grain breads, low-fat dairy products, beans, lean meats, and fish  Replace butter and margarine with heart-healthy oils such as olive oil and canola oil  · Maintain a healthy weight    Ask your healthcare provider how much you should weigh  Ask him or her to help you create a safe weight loss plan if you are overweight  Even a small goal of a 10% weight loss can improve your heart health  · Get regular physical activity  Physical activity helps improve your heart health  Get at least 150 minutes of moderate aerobic physical activity each week  Your healthcare provider can help you create an activity plan  · Manage other health conditions  This includes high blood pressure or cholesterol, sleep apnea, diabetes, coronary disease, heart failure and kidney disease  Take medicine as directed and follow your treatment plan  The above information is an  only  It is not intended as medical advice for individual conditions or treatments  Talk to your doctor, nurse or pharmacist before following any medical regimen to see if it is safe and effective for you

## 2022-03-31 NOTE — ASSESSMENT & PLAN NOTE
Intermittent dyspnea  Fairly preserved exercise capacity  Multiple risk factors for coronary artery disease  Pharmacologic risk stress planned for further stratification  If this shows low risk findings, continue medical management

## 2022-03-31 NOTE — ASSESSMENT & PLAN NOTE
BP Readings from Last 3 Encounters:   03/31/22 120/80   03/18/22 126/75   02/11/22 126/75       Advised blood pressure monitoring  Continue current medications  Lifestyle modification including increased physical activity, low-salt diet rich in fruits and vegetables, avoidance of alcohol intake and maintaining healthy weight

## 2022-03-31 NOTE — PROGRESS NOTES
[facility]  [address] [address]  Phone#  [phone]  Fax#  [phone] [address] Cardiology Office Consultation             NAME: Robyn Echeverria  AGE: 77 y o  SEX: male   : 1955   MRN: [id number]    DATE: 3/31/2022  TIME: 9:58 AM    Assessment and plan:    Persistent atrial fibrillation ([facility] )  Diagnosed 6 months ago incidentally on exam  Mildly symptomatic  Rate controlled  EKG today shows persistent atrial fibrillation with controlled ventricular rate  Continue anticoagulation  Avoid any NSAIDs or aspirin  Discussed in detail the options of ablation, cardioversion, and ongoing medical therapy with beta-blockers and anticoagulation  Since his symptoms are only mild at the current time and heart rate is controlled, we have elected the option of medical therapy  Pharmacologic nuclear stress test planned  for further risk stratification    Essential hypertension  BP Readings from Last 3 Encounters:   22 120/80   22 126/75   22 126/75       Advised blood pressure monitoring  Continue current medications  Lifestyle modification including increased physical activity, low-salt diet rich in fruits and vegetables, avoidance of alcohol intake and maintaining healthy weight  Type 2 diabetes mellitus without complication, without long-term current use of insulin ([facility] )    Lab Results   Component Value Date    HGBA1C 6 4 2022     Well controled  Mixed dyslipidemia  Recent labs from the [place] reviewed  LFT acceptable  Last LDL was 50  Continue statins  Dyspnea on exertion  Intermittent dyspnea  Fairly preserved exercise capacity  Multiple risk factors for coronary artery disease  Pharmacologic risk stress planned for further stratification  If this shows low risk findings, continue medical management  We had long discussion regarding atrial fibrillation treatment options  Elected for medical therapy for now  Pharmacologic stress test plan further risk stratification  Moderation of caffeine intake discussed  Continue ongoing exercise and efforts at weight loss  He will report any worsening symptoms  He will closely follow up with the [place] clinic with Dr Patrice Rayo  as well  Chief Complaint   Patient presents with    New Patient Visit    Diabetes    Irregular Heart Beat    Shortness of Breath       HPI:    Miguel Walker is a 77y o -year-old male who presents to the cardiology clinic for evaluation for recently diagnosed atrial fibrillation  Previous medical care has been with the [place]   he was seen by his internist and referred here for further evaluation  He has been on beta-blockers and anticoagulation for his atrial fibrillation that was diagnosed recently  After his recent medicine visit, his advised to stop the aspirin and NSAIDs  Current symptoms:  He reports intermittent palpitations and irregular heartbeat  He also reports dyspnea on exertion  Recent echo showed preserved EF, mild LVH, left atrial enlargement and mild ascending aortic enlargement  He has no known coronary artery disease  Mother lived to age 80 and father  of throat cancer from smoking  Walks 2-3 miles a day  Gets occasionally short of breath but overall no change in functional capacity  Drinks 3 cups a coffee a day  No history of smoking  Did drink alcohol in the past but now  has stopped drinking  Served in the [employer] in 80s in [employer] [facility]  He reports intermittent shortness of breath which is not exertional   Denies exertional chest pain  No prior history of coronary artery disease  He has never had a stress test in the past   Historically blood pressures have been well controlled  Recent lab work from the [place] reviewed  LDL and hemoglobin A1c are both excellent  Recent echocardiogram images personally reviewed        Cardiology Problem list:  Persistent atrial fibrillation:  Eliquis and metoprolol  3/22:  Echo:  EF 60, mild LVH, LA enlargement, mild MR, aortic root normal, ascending aorta 40 mm  Diabetes with dyslipidemia  Hypertension    Past history, family history, social history, current medications, vital signs, recent lab and imaging studies and  prior cardiology studies reviewed independently on this visit  BP Readings from Last 4 Encounters:   03/31/22 120/80   03/18/22 126/75   02/11/22 126/75   01/28/22 146/90      Wt Readings from Last 3 Encounters:   03/31/22 117 kg (258 lb)   03/18/22 118 kg (261 lb)   01/28/22 118 kg (261 lb 3 2 oz)       Lab Results   Component Value Date    LDLCALC 82 09/20/2017     Lab Results   Component Value Date    CREATININE 1 05 10/30/2017          ALLERGIES:  Allergies   Allergen Reactions    Other Shortness Of Breath     Cats and dogs         Current Outpatient Medications:     atorvastatin (LIPITOR) 20 mg tablet, Take 20 mg by mouth daily, Disp: , Rfl:     lisinopril (ZESTRIL) 5 mg tablet, TAKE ONE TABLET BY MOUTH EVERY DAY FOR BLOOD PRESSURE/HEART, Disp: , Rfl:     metFORMIN (GLUCOPHAGE) 500 mg tablet, Take 1,000 mg by mouth, Disp: , Rfl:     metoprolol tartrate (LOPRESSOR) 25 mg tablet, Take 0 5 tablets by mouth 2 (two) times a day, Disp: , Rfl:     Multiple Vitamins-Minerals (CENTRUM ADULTS PO), Take 50 mg by mouth, Disp: , Rfl:     omeprazole (PriLOSEC) 20 mg delayed release capsule, , Disp: , Rfl:     apixaban (Eliquis) 5 mg, Take 1 tablet (5 mg total) by mouth 2 (two) times a day, Disp: 60 tablet, Rfl: 2      Review of Systems   Constitutional: Positive for fatigue  HENT: Negative  Eyes: Negative  Respiratory: Positive for shortness of breath  Negative for cough  Cardiovascular: Positive for palpitations  Negative for chest pain  Gastrointestinal: Negative  Endocrine: Negative  Genitourinary: Negative  Musculoskeletal: Positive for arthralgias  Skin: Negative  Allergic/Immunologic: Negative      Neurological: Negative  Hematological: Does not bruise/bleed easily  Psychiatric/Behavioral: Negative  Past Medical History:   Diagnosis Date    Appendicitis     Atrial fibrillation ([facility] )     Colon polyp     Diabetes mellitus ([facility] )     Hyperlipidemia     Hypertension        Past Surgical History:   Procedure Laterality Date    APPENDECTOMY      CHOLECYSTECTOMY      COLONOSCOPY         Family History   Problem Relation Age of Onset    Throat cancer Father     Prostate cancer Brother     Alcohol abuse Brother        Social History   reports that he has never smoked  He has never used smokeless tobacco  He reports current alcohol use  He reports that he does not use drugs  Objective:     Vitals:    03/31/22 0925   BP: 120/80   Pulse: 65   SpO2: 97%     Wt Readings from Last 3 Encounters:   03/31/22 117 kg (258 lb)   03/18/22 118 kg (261 lb)   01/28/22 118 kg (261 lb 3 2 oz)     Pulse Readings from Last 3 Encounters:   03/31/22 65   03/18/22 70   02/11/22 98     BP Readings from Last 3 Encounters:   03/31/22 120/80   03/18/22 126/75   02/11/22 126/75         Physical Exam  Constitutional:       General: He is not in acute distress  HENT:      Mouth/Throat:      Mouth: Mucous membranes are moist    Eyes:      Conjunctiva/sclera: Conjunctivae normal    Neck:      Vascular: No carotid bruit or JVD  Cardiovascular:      Rate and Rhythm: Rhythm irregularly irregular  Heart sounds: S1 normal and S2 normal  Murmur heard  Systolic murmur is present with a grade of 2/6  Pulmonary:      Effort: Pulmonary effort is normal  No tachypnea  Breath sounds: Decreased breath sounds present  No wheezing or rhonchi  Chest:      Chest wall: No tenderness  Abdominal:      General: Bowel sounds are normal    Musculoskeletal:      Right lower leg: No edema  Left lower leg: No edema  Skin:     Findings: No lesion  Neurological:      General: No focal deficit present        Mental Status: He is alert  Mental status is at baseline  Psychiatric:         Mood and Affect: Mood normal          Pertinent Laboratory/Diagnostic Studies:    Laboratory studies reviewed personally by Mike Rubio MD     Labs from [place] in 3/21/22 reviewed  Hb A1c is 6 4    LDL is 50    BMP:   Lab Results   Component Value Date    SODIUM 139 10/30/2017    K 5 0 10/30/2017     10/30/2017    CO2 26 10/30/2017    BUN 24 10/30/2017    CREATININE 1 05 10/30/2017    GLUC 85 10/30/2017    CALCIUM 9 9 10/30/2017     CBC:  Lab Results   Component Value Date    WBC 8 40 10/30/2017    WBC 7 20 09/17/2014    RBC 4 67 10/30/2017    RBC 4 62 09/17/2014    HGB 15 6 10/30/2017    HGB 14 9 09/17/2014    HCT 44 4 10/30/2017    HCT 43 3 09/17/2014    MCV 95 10/30/2017    MCV 94 09/17/2014    MCH 33 4 10/30/2017    MCH 32 3 09/17/2014    RDW 12 4 10/30/2017    RDW 13 1 09/17/2014     10/30/2017     09/17/2014     Coags:    Lipid Profile:   Lab Results   Component Value Date    CHOL 123 03/21/2014     Lab Results   Component Value Date    HDL 41 09/20/2017     Lab Results   Component Value Date    LDLCALC 82 09/20/2017     Lab Results   Component Value Date    TRIG 333 (H) 09/20/2017      Lab Results   Component Value Date    [id number] 0 687 03/21/2014     Lab Results   Component Value Date    ALT 41 11/23/2021    AST 17 11/23/2021         Imaging Studies:   Echo complete w/ contrast if indicated    Result Date: 3/18/2022  Narrative: Ju Gomez  Left Ventricle: Left ventricular cavity size is normal  Wall thickness is mildly increased  The left ventricular ejection fraction is 60%  Systolic function is normal  Wall motion is normal  There is mild concentric hypertrophy    Right Ventricle: Right ventricular cavity size is normal  Systolic function is normal    Left Atrium: The atrium is mildly dilated    Mitral Valve: There is mild regurgitation    Aorta: The aortic root is normal in size  The ascending aorta is mildly dilated at 40mm  Cardiac testing:   EKG reviewed personally: atrial fibrillation, rate 65  Orders Placed This Encounter   Procedures    POCT ECG       Counseling / Coordination of Care  Total office time spent today 55 minutes  Greater than 50% of total time was spent with the patient and / or family counseling and / or coordination of care  Marco Antonio Turner MD, [facility]    Portions of the record may have been created with voice recognition software  Occasional wrong word or "sound a like" substitutions may have occurred due to the inherent limitations of voice recognition software  Read the chart carefully and recognize, using context, where substitutions have occurred  If you have any questions or concerns about the context, text or information contained within the body of this dictation, please contact myself, the provider, for further clarification

## 2022-04-08 ENCOUNTER — HOSPITAL ENCOUNTER (OUTPATIENT)
Dept: NON INVASIVE DIAGNOSTICS | Facility: CLINIC | Age: 67
Discharge: HOME | End: 2022-04-08
Payer: MEDICARE

## 2022-04-08 VITALS
DIASTOLIC BLOOD PRESSURE: 80 MMHG | WEIGHT: 258 LBS | BODY MASS INDEX: 29.85 KG/M2 | HEIGHT: 78 IN | HEART RATE: 75 BPM | OXYGEN SATURATION: 98 % | SYSTOLIC BLOOD PRESSURE: 138 MMHG

## 2022-04-08 DIAGNOSIS — I48.19 PERSISTENT ATRIAL FIBRILLATION (HCC): ICD-10-CM

## 2022-04-08 DIAGNOSIS — R06.00 DYSPNEA ON EXERTION: ICD-10-CM

## 2022-04-08 LAB
CHEST PAIN STATEMENT: NORMAL
MAX DIASTOLIC BP: 80 MMHG
MAX HEART RATE: 118 BPM
MAX HR PERCENT: 76 %
MAX PREDICTED HEART RATE: 154 BPM
MAX. SYSTOLIC BP: 140 MMHG
NUC STRESS EJECTION FRACTION: 59 %
PROTOCOL NAME: NORMAL
RATE PRESSURE PRODUCT: NORMAL
REASON FOR TERMINATION: NORMAL
SL CV REST NUCLEAR ISOTOPE DOSE: 15.11 MCI
SL CV STRESS NUCLEAR ISOTOPE DOSE: 48.1 MCI
SL CV STRESS RECOVERY BP: NORMAL MMHG
SL CV STRESS RECOVERY HR: 86 BPM
SL CV STRESS RECOVERY O2 SAT: 98 %
STRESS ANGINA INDEX: 0
STRESS BASELINE BP: NORMAL MMHG
STRESS BASELINE HR: 75 BPM
STRESS O2 SAT REST: 98 %
STRESS PEAK HR: 118 BPM
STRESS PERCENT HR: 76 %
STRESS POST EXERCISE DUR MIN: 3 MIN
STRESS POST EXERCISE DUR SEC: 0 SEC
STRESS POST O2 SAT PEAK: 99 %
STRESS POST PEAK BP: 140 MMHG
STRESS TARGET HR: 118 BPM
STRESS/REST PERFUSION RATIO: 1.04
TARGET HR FORMULA: NORMAL
TEST INDICATION: NORMAL
TIME IN EXERCISE PHASE: NORMAL

## 2022-04-08 PROCEDURE — 78452 HT MUSCLE IMAGE SPECT MULT: CPT | Performed by: INTERNAL MEDICINE

## 2022-04-08 PROCEDURE — A9502 TC99M TETROFOSMIN: HCPCS

## 2022-04-08 PROCEDURE — 93018 CV STRESS TEST I&R ONLY: CPT | Performed by: INTERNAL MEDICINE

## 2022-04-08 PROCEDURE — 93016 CV STRESS TEST SUPVJ ONLY: CPT | Performed by: INTERNAL MEDICINE

## 2022-04-08 PROCEDURE — 78452 HT MUSCLE IMAGE SPECT MULT: CPT

## 2022-04-08 PROCEDURE — G1004 CDSM NDSC: HCPCS

## 2022-04-08 PROCEDURE — 93017 CV STRESS TEST TRACING ONLY: CPT

## 2022-04-08 RX ADMIN — REGADENOSON 0.4 MG: 0.08 INJECTION, SOLUTION INTRAVENOUS at 09:55

## 2022-04-08 NOTE — RESULT ENCOUNTER NOTE
Nuclear Stress test reviewed  This shows normal pumping strength of the heart  There is reasonably good blood flow was seen to all the areas of the heart on the nuclear imaging except a small area on the apex of the heart that may represent a tiny heart attack  EKG shows no significant abnormalities with stress  This is a low risk stress test but I would like to do a coronary calcium score to ensure that there is no significant calcium build up in the heart arteries  Please call the patient with test results

## 2022-04-17 ENCOUNTER — HOSPITAL ENCOUNTER (OUTPATIENT)
Dept: CT IMAGING | Facility: HOSPITAL | Age: 67
Discharge: HOME | End: 2022-04-17
Attending: INTERNAL MEDICINE
Payer: COMMERCIAL

## 2022-04-17 DIAGNOSIS — I48.19 PERSISTENT ATRIAL FIBRILLATION (HCC): ICD-10-CM

## 2022-04-17 DIAGNOSIS — R06.00 DYSPNEA ON EXERTION: ICD-10-CM

## 2022-04-17 PROCEDURE — G1004 CDSM NDSC: HCPCS

## 2022-04-17 PROCEDURE — 75571 CT HRT W/O DYE W/CA TEST: CPT

## 2022-04-18 ENCOUNTER — OFFICE VISIT (OUTPATIENT)
Dept: BARIATRICS | Facility: CLINIC | Age: 67
End: 2022-04-18
Payer: MEDICARE

## 2022-04-18 VITALS
DIASTOLIC BLOOD PRESSURE: 84 MMHG | SYSTOLIC BLOOD PRESSURE: 134 MMHG | HEIGHT: 77 IN | WEIGHT: 252.2 LBS | HEART RATE: 69 BPM | BODY MASS INDEX: 29.78 KG/M2

## 2022-04-18 DIAGNOSIS — I48.19 PERSISTENT ATRIAL FIBRILLATION (HCC): ICD-10-CM

## 2022-04-18 DIAGNOSIS — E66.9 OBESITY (BMI 30.0-34.9): ICD-10-CM

## 2022-04-18 DIAGNOSIS — Z91.89 AT RISK FOR SLEEP APNEA: ICD-10-CM

## 2022-04-18 DIAGNOSIS — E66.9 OBESITY, CLASS I, BMI 30-34.9: Primary | ICD-10-CM

## 2022-04-18 DIAGNOSIS — E11.9 TYPE 2 DIABETES MELLITUS WITHOUT COMPLICATION, WITHOUT LONG-TERM CURRENT USE OF INSULIN (HCC): ICD-10-CM

## 2022-04-18 PROCEDURE — 99203 OFFICE O/P NEW LOW 30 MIN: CPT | Performed by: FAMILY MEDICINE

## 2022-04-18 NOTE — PATIENT INSTRUCTIONS
Goals:  Do not skip any meals! Food log (ie ) www myfitnesspal com,sparkpeople  com,loseit com,calorieking  com,etc  baritastic (use skinnytaste  com, dietdoctor  com or smartphone lou Endymed for recipes)  No sugary beverages  At least 64oz of water daily  Increase physical activity by 10 minutes daily   Gradually increase physical activity to a goal of 5 days per week for 30 minutes of MODERATE intensity PLUS 2 days per week of FULL BODY resistance training (use smartphone apps [employer], Home Workout, etc )  Goal protein 120g per day  0317-5492 calories per day    Try these alternative food options:  Protein shakes- Premier, Pure protein, Fairlife (30g or 42g protein), Iconic  Lactose free protein shakes-  Ripple, Iconic, Mclean  Protein bars- Quest, Pure protein  Ice cream- Edwar's, Yasso, Enlightened, Halo Top   Chips- Quest protein chips, Cheese crisps   Bread- 647 wheat, Protein Keto bread, whole wheat arlene bread, low carb/high protein wraps  Pasta- Chickpea pasta, Pasta zero or similar  Rice- Cauliflower rice, quinoa, brown rice  Fruits- berries, cantaloupe, peaches, apples, oranges  Yogurt- Ratio (25g protein), Two good, Chobani 60 beatriz or 100 beatriz, Oikos triple zero  Sugar alternatives- Stevia, Monk fruit, Swerve

## 2022-04-18 NOTE — PROGRESS NOTES
Assessment/Plan:  Arian Carter was seen today for consult  Diagnoses and all orders for this visit:    Obesity, Class I, BMI 30-34 9  Obesity (BMI 30 0-34 9)  -     Ambulatory referral to Weight Management  Type 2 diabetes mellitus without complication, without long-term current use of insulin ([facility]  Patient wishes to pursue the conservative program but would also like to meet with the dietitian as well  Dietitian visit will be scheduled  Patient encouraged to start tracking his calories  Patient encouraged to start eating snacks that are higher in protein  A list of high-protein snacks provided  I did also provided with a sample meal plan in the range of 9307-2923 calories per day  I suspect that by his follow-up appointment he will no longer be in the obesity category with dietary change and will be in the overweight category  At that point he will not qualify for anti obesity medication however medication for type 2 diabetes can be adjusted to help with further weight loss such as patient starting a GLP-1 agonist   Denies personal or family history of thyroid cancer  Denies personal or family history of multiple endocrine neoplasia  Denies personal history of pancreatitis  Currently on metformin for diabetes  At risk for sleep apnea  -     Ambulatory referral to Sleep Medicine; Future  Referral to Sleep Medicine advised to assess for sleep apnea and patient in agreement  Referral provided  Persistent atrial fibrillation ([facility] )  Follows with cardiology  I advised against very low-calorie diet  Avoid phentermine        - Discussed options of HealthyCORE-Intensive Lifestyle Intervention Program, Very Low Calorie Diet-VLCD and Conservative Program and the role of weight loss medications  - Explained the importance of making lifestyle changes first before starting any anti-obesity medications    Patient should demonstrate lifestyle changes first before anti-obesity medication can be initiated  - Patient is interested in pursuing Conservative Program  - Initial weight loss goal of 5-10% weight loss for improved health  - Weight loss can improve patient's co-morbid conditions and/or prevent weight-related complications  Goals:  Do not skip any meals! Food log (ie ) www myfitnesspal com,sparkpeople  com,loseit com,calorieking  com,etc  baritastic (use skinnytaste  com, dietdoctor  com or smartphone lou Eyewitness Surveillance for recipes)  No sugary beverages  At least 64oz of water daily  Increase physical activity by 10 minutes daily  Gradually increase physical activity to a goal of 5 days per week for 30 minutes of MODERATE intensity PLUS 2 days per week of FULL BODY resistance training (use smartphone apps [employer], Home Workout, etc )  Goal protein 120g per day  6353-3033 calories per day    Total time spent: 30 min, with >50% face-to-face time spent counseling patient on nonsurgical interventions for the treatment of excess weight  Discussed in detail nonsurgical options including intensive lifestyle intervention program, very low-calorie diet program and conservative program   Discussed the role of weight loss medications  Counseled patient on diet behavior and exercise modification for weight loss  Follow up in approximately 6-8 weeks with Non-Surgical Physician/Advanced Practitioner  Subjective:   Chief Complaint   Patient presents with    Consult     Pt is here today for MWM consult  Patient ID: Ap Tay  is a 77 y o  male with excess weight/obesity here to pursue weight management  Previous notes and records have been reviewed      Past Medical History:   Diagnosis Date    Appendicitis     [facility] ([facility] )     Colon polyp     [facility]     Hyperlipidemia     Hypertension      Past Surgical History:   Procedure Laterality Date    APPENDECTOMY      CHOLECYSTECTOMY      COLONOSCOPY         HPI:  Wt Readings from Last 20 Encounters:   04/18/22 114 kg (252 lb 3 2 oz)   22 117 kg (258 lb)   22 117 kg (258 lb)   22 118 kg (261 lb)   22 118 kg (261 lb 3 2 oz)   21 118 kg (259 lb 9 6 oz)   21 110 kg (242 lb 12 8 oz)     Obesity/Excess Weight:  Severity: Mild  Onset:   (when pt left the [employer])    Modifiers: Diet and Exercise  Contributing factors: Poor Food Choices and Insufficient Physical Activity  Associated symptoms: comorbid conditions and fatigue    Patient is interested in meeting with a dietitian  B- 3 egg omlet and 3 sausage links  S- munchos or popcorn  L- skipping for the past mo (fasting)  S- munchos or popcorn  D- chicken and veggie  S- munchos or popcorn    Hydration: 36oz water daily, 16oz coffee nonfat creamer no sugar, 8oz unsweet iced tea  Alcohol: quit 2022 after A-fib Dx  Smoking: no  Exercise: walks 2-3miles per day  Occupation:   Sleep: 8hrs  STOP ban/8    The following portions of the patient's history were reviewed and updated as appropriate: allergies, current medications, past family history, past medical history, past social history, past surgical history, and problem list     Review of Systems   Constitutional: Negative for fever  Respiratory: Negative for shortness of breath  Cardiovascular: Negative for chest pain  Gastrointestinal: Negative for abdominal pain and blood in stool  Genitourinary: Negative for dysuria and hematuria  Musculoskeletal: Negative for arthralgias and myalgias  Skin: Negative for rash  Neurological: Negative for headaches  Psychiatric/Behavioral: Negative for dysphoric mood and suicidal ideas  The patient is not nervous/anxious  Objective:  /84 (BP Location: Left arm, Patient Position: Sitting, Cuff Size: Large)   Pulse 69   Ht 6' 4 5" (1 943 m)   Wt 114 kg (252 lb 3 2 oz)   BMI 30 30 kg/m²   Constitutional: Well-developed, well-nourished and  obese Body mass index is 30 3 kg/m²  Brionna Console   HEENT: No conjunctival injection  Pulmonary: No increased work of breathing or signs of respiratory distress  CV: Well-perfused  GI:  Obese  Non-distended  MSK: No edema   Neuro: Oriented to person, place and time  Normal Speech  Normal gait  Psych: Normal affect and mood  Labs and Imaging  Recent labs and imaging have been personally reviewed    Lab Results   Component Value Date    WBC 8 40 10/30/2017    HGB 15 6 10/30/2017    HCT 44 4 10/30/2017    MCV 95 10/30/2017     10/30/2017     Lab Results   Component Value Date     03/21/2014    SODIUM 139 10/30/2017    K 5 0 10/30/2017     10/30/2017    CO2 26 10/30/2017    ANIONGAP 8 03/21/2014    AGAP 7 10/30/2017    BUN 24 10/30/2017    CREATININE 1 05 10/30/2017    GLUC 85 10/30/2017    GLUF 369 (H) 09/20/2017    CALCIUM 9 9 10/30/2017    AST 17 11/23/2021    ALT 41 11/23/2021    ALKPHOS 58 11/23/2021    PROT 7 8 09/17/2014    TP 7 7 11/23/2021    BILITOT 1 70 (H) 09/17/2014    TBILI 1 53 (H) 11/23/2021    EGFR 76 10/30/2017     Lab Results   Component Value Date    HGBA1C 6 4 03/09/2022     Lab Results   Component Value Date    [id number] 0 687 03/21/2014     Lab Results   Component Value Date    CHOLESTEROL 190 09/20/2017     Lab Results   Component Value Date    HDL 41 09/20/2017     Lab Results   Component Value Date    TRIG 333 (H) 09/20/2017     Lab Results   Component Value Date    LDLCALC 82 09/20/2017

## 2022-04-21 NOTE — RESULT ENCOUNTER NOTE
Calcium score is very low at 15  This implies there is very minimal hardening of the heart arteries noted and as a result low risk of significant blockages in heart arteries  These results are reassuring  Please call patient

## 2022-04-29 ENCOUNTER — OFFICE VISIT (OUTPATIENT)
Dept: BARIATRICS | Facility: CLINIC | Age: 67
End: 2022-04-29

## 2022-04-29 VITALS — HEIGHT: 77 IN | WEIGHT: 252.4 LBS | BODY MASS INDEX: 29.8 KG/M2

## 2022-04-29 DIAGNOSIS — R63.5 ABNORMAL WEIGHT GAIN: Primary | ICD-10-CM

## 2022-04-29 PROCEDURE — WMDI30

## 2022-04-29 PROCEDURE — RECHECK

## 2022-04-29 NOTE — PROGRESS NOTES
Weight Management Medical Nutrition Assessment  Pt here today to work on weight loss to improve his health  He was dx with DM in 2017 and wants to continue on track with a healthy lifestyle  Reviewed pt's diet recall  He was originally having three meals per day, but "heard that fasting was a good thing" so has started skipping lunch  He also appears to over eat his salty snack ie: Muncho chips (720cals for the whole bag)  Suggested to pt to have a protein shake for lunch since he is often driving for work  Also provided alternate options for snack and advised to decrease portions  He does walk on a regular basis so suggested 6914-9048 cals per day for weight loss  Pt has decided to f/u wth 3 RD bundle which he will start at his next visit  Patient seen by Medical Provider in past 6 months:  yes  Requested to schedule appointment with Medical Provider: No      Anthropometric Measurements  Start Weight (#): 252 2# (4/18/22 w/provider)  Current Weight (#): 252 4#  TBW % Change from start weight:-  Ideal Body Weight (#):205#  Goal Weight (#):200#    Highest was 275 about 1 year ago  Type 2 DM since 2017    Weight Loss History  Previous weight loss attempts: Commercial Programs ([employer], [employer], etc )  Counseling with  MD  Exercise  High Protein/Low CHO diets (Atkins, Union, etc )  Nutrition Counseling with RD  Self Created Diets (Portion Control, Healthy Food Choices, etc )    Food and Nutrition Related History  Wake up: 5:30-6am   Bed Time:9am  Good sleep    Food Recall  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[employer]  -140 in morning  Breakfast: 9am-3 eggs + 3 sausage + coffee w/half& half   Snack:-  Lunch: was eating lunch at 1pm, but stopped eating lunch      Snack: crackers  Dinner: 7pm 5-6ozchicken/pork/steak and veggies (broccoli)  Snack: muncho chips (1 serving is 160 cals, but eats whole bag which is 4 5 servings) and likes popcorn    Beverages: water, sugar free beverages and coffee/tea  Volume of beverage intake: 48oz water, unsweet tea, 16oz coffee w/half&half    Weekends: Same  Cravings: salty  Trouble area of day:evening    Frequency of Eating out: irregularly  Food restrictions:none  Cooking: self   Food Shopping: self    Physical Activity Intake  Activity:  Walking group, walks 2-2 5 miles each day during the week and 4 miles on weekend  Frequency:daily  Physical limitations/barriers to exercise: none    Estimated Needs  Energy  Bear Jeevan Energy Needs: BMR : 2032     1-2# loss weekly sedentary:  8436-8472             1-2# loss weekly lightly active:6427-9125  Maintenance calories for sedentary activity level: 2439  Protein:      (1 0-1 2g/kg IBW)  Fluid: 100oz     (35mL/kg IBW)    Nutrition Diagnosis  Yes; Overweight/obesity  related to Excess energy intake as evidenced by  BMI more than normative standard for age and sex (obesity-grade I 26-30  9)       Nutrition Intervention    Nutrition Prescription  Calories: 1800-2000cals  Protein:93-112gm  Fluid:100oz    Meal Plan (Sandro/Pro/Carb)  Breakfast: 500cals  Snack: optional  Lunch:protien shake + fruit (220cals)  Snack:200cals  Dinner:500cals  Snack:200cals    Nutrition Education:    Healthy Core Manual  Calorie controlled menu  Lean protein food choices  Healthy snack options  Food journaling tips      Nutrition Counseling:  Strategies: meal planning, portion sizes, healthy snack choices, hydration, fiber intake, protein intake, exercise, food journal      Monitoring and Evaluation:  Evaluation criteria:  Energy Intake  Meet protein needs  Maintain adequate hydration  Monitor weekly weight  Meal planning/preparation  Food journal   Decreased portions at mealtimes and snacks  Physical activity     Barriers to learning:none  Readiness to change: Action:  (Changing behavior)  Comprehension: very good  Expected Compliance: very good

## 2022-06-22 ENCOUNTER — OFFICE VISIT (OUTPATIENT)
Dept: CARDIOLOGY CLINIC | Facility: CLINIC | Age: 67
End: 2022-06-22
Payer: MEDICARE

## 2022-06-22 VITALS
DIASTOLIC BLOOD PRESSURE: 74 MMHG | BODY MASS INDEX: 29.09 KG/M2 | HEART RATE: 75 BPM | OXYGEN SATURATION: 98 % | WEIGHT: 246.38 LBS | SYSTOLIC BLOOD PRESSURE: 116 MMHG | HEIGHT: 77 IN | TEMPERATURE: 97.2 F

## 2022-06-22 DIAGNOSIS — I10 ESSENTIAL HYPERTENSION: ICD-10-CM

## 2022-06-22 DIAGNOSIS — I48.19 PERSISTENT ATRIAL FIBRILLATION (HCC): Primary | ICD-10-CM

## 2022-06-22 DIAGNOSIS — E78.2 MIXED DYSLIPIDEMIA: ICD-10-CM

## 2022-06-22 DIAGNOSIS — I77.89 ASCENDING AORTA ENLARGEMENT (HCC): ICD-10-CM

## 2022-06-22 PROBLEM — R06.00 DYSPNEA ON EXERTION: Status: RESOLVED | Noted: 2022-03-31 | Resolved: 2022-06-22

## 2022-06-22 PROBLEM — R06.09 DYSPNEA ON EXERTION: Status: RESOLVED | Noted: 2022-03-31 | Resolved: 2022-06-22

## 2022-06-22 PROCEDURE — 99213 OFFICE O/P EST LOW 20 MIN: CPT | Performed by: INTERNAL MEDICINE

## 2022-06-22 RX ORDER — SILDENAFIL 100 MG/1
100 TABLET, FILM COATED ORAL
COMMUNITY
Start: 2022-05-18

## 2022-06-22 NOTE — PROGRESS NOTES
[facility] [address] [place] [address]  Phone#  [phone]  Fax#  [phone]  [facility] Cardiology Office Follow-up Visit             NAME: Shania Prasad  AGE: 77 y o  SEX: male   : 1955   MRN: [id number]    DATE: 2022  TIME: 8:17 AM    Assessment and plan:    Persistent atrial fibrillation (HCC)  Continue strategy of rate control and anticoagulation  Currently has minimal symptoms  Avoid any NSAIDs or aspirin  Previously we had discussed the options of ablation, cardioversion, and ongoing medical therapy with beta-blockers and anticoagulation  Recent low risk nuclear stress test with very low calcium score  Essential hypertension  BP Readings from Last 3 Encounters:   22 116/74   22 134/84   22 138/80       Advised home blood pressure monitoring  Continue current medications  Lifestyle modification measures to help blood pressure control include:increased physical activity, low-salt diet rich in fruits and vegetables, avoidance of alcohol intake and maintaining healthy weight  Mixed dyslipidemia  Lab Results   Component Value Date    LDLCALC 82 2017   Continue atorvastatin  Ascending aorta enlargement (HCC)  Ascending aorta 44 mm on CT chest   Indexed aortic size probably in the normal range given his body habitus  Repeat CT planned for next year for ensuring stability of the aortic root size  Chief Complaint   Patient presents with    Atrial Fibrillation       HPI:    Shania Prasad is a 77y o -year-old male who presents to the cardiology clinic for follow up  He has history of atrial fibrillation with controlled ventricular rate  Previous calcium score was only 15  CT chest also showed ascending aortic enlargement at 44 mm  However his height is 6 ft 5 in  His body surface area is 2 53  Based on that his indexed aortic size is 17mm/sq  m  which probably is in the normal range    We will plan a CT next year for verification of stability  Blood pressure remains controlled  Patient is doing well from a cardiovascular standpoint  Denies recent hospitalizations  Current medications reviewed  Reports compliance to medicines  No side effects reported  Denies chest pain on exertion  Denies worsening shortness of breath  Denies sustained palpitations  Labs done every 6 months at the [address] [place] [address] St   he will bring lab work at next visit for review  Denies any bleeding issues  Discussed holding anticoagulation for 2 days before any dental extraction  Cardiology Problem list:  Persistent atrial fibrillation:  Eliquis and metoprolol  3/22:  Echo:  EF 60, mild LVH, LA enlargement, mild MR  4/22: Echo: EF 59, small apical infarct  4/22:  Calcium score 15  Aortic enlargement: 4/22:  CT  Ascending aorta 44 mm  BSA 2 5  Indexed aortic root size 1 7, possibly normal  Diabetes with dyslipidemia  Hypertension    Past history, family history, social history, current medications, vital signs, recent lab and imaging studies and  prior cardiology studies reviewed independently on this visit      BP Readings from Last 4 Encounters:   06/22/22 116/74   04/18/22 134/84   04/08/22 138/80   03/31/22 120/80      Wt Readings from Last 3 Encounters:   06/22/22 112 kg (246 lb 6 oz)   04/29/22 114 kg (252 lb 6 4 oz)   04/18/22 114 kg (252 lb 3 2 oz)       Lab Results   Component Value Date    LDLCALC 82 09/20/2017     Lab Results   Component Value Date    CREATININE 1 05 10/30/2017        ALLERGIES:  Allergies   Allergen Reactions    Other Shortness Of Breath     Cats and dogs         Current Outpatient Medications:     atorvastatin (LIPITOR) 20 mg tablet, Take 20 mg by mouth daily, Disp: , Rfl:     lisinopril (ZESTRIL) 5 mg tablet, TAKE ONE TABLET BY MOUTH EVERY DAY FOR BLOOD PRESSURE/HEART, Disp: , Rfl:     metFORMIN (GLUCOPHAGE) 500 mg tablet, Take 1,000 mg by mouth, Disp: , Rfl:     metoprolol tartrate (LOPRESSOR) 25 mg tablet, Take 0 5 tablets by mouth 2 (two) times a day, Disp: , Rfl:     Multiple Vitamins-Minerals (CENTRUM ADULTS PO), Take 50 mg by mouth, Disp: , Rfl:     NON FORMULARY, Kirstie Byrd Male Enhancement Supplement, Disp: , Rfl:     omeprazole (PriLOSEC) 20 mg delayed release capsule, , Disp: , Rfl:     sildenafil (VIAGRA) 100 mg tablet, Take 100 mg by mouth, Disp: , Rfl:     apixaban (Eliquis) 5 mg, Take 1 tablet (5 mg total) by mouth 2 (two) times a day, Disp: 60 tablet, Rfl: 2    Review of Systems   Constitutional: Negative for fever  Respiratory: Negative for chest tightness, shortness of breath and wheezing  Cardiovascular: Negative for chest pain, palpitations and leg swelling  Skin: Negative for rash  Neurological: Negative for syncope  Hematological: Does not bruise/bleed easily  Past Medical History:   Diagnosis Date    Appendicitis     [facility] ([facility] )     Colon polyp     Diabetes mellitus ([facility] )     Hyperlipidemia     Hypertension      Past Surgical History:   Procedure Laterality Date    APPENDECTOMY      CHOLECYSTECTOMY      COLONOSCOPY       Family History   Problem Relation Age of Onset    Throat cancer Father     Prostate cancer Brother     Alcohol abuse Brother        Social History   reports that he has never smoked  He has never used smokeless tobacco  He reports previous alcohol use  He reports that he does not use drugs  Objective:     Vitals:    06/22/22 0752   BP: 116/74   Pulse: 75   Temp: (!) 97 2 °F (36 2 °C)   SpO2: 98%     Wt Readings from Last 3 Encounters:   06/22/22 112 kg (246 lb 6 oz)   04/29/22 114 kg (252 lb 6 4 oz)   04/18/22 114 kg (252 lb 3 2 oz)     Pulse Readings from Last 3 Encounters:   06/22/22 75   04/18/22 69   04/08/22 75     BP Readings from Last 3 Encounters:   06/22/22 116/74   04/18/22 134/84   04/08/22 138/80       Physical Exam  Constitutional:       General: He is not in acute distress    HENT: Mouth/Throat:      Mouth: Mucous membranes are moist    Eyes:      Conjunctiva/sclera: Conjunctivae normal    Neck:      Vascular: No carotid bruit  Cardiovascular:      Rate and Rhythm: Rhythm irregularly irregular  Heart sounds: S1 normal and S2 normal  Murmur heard  Systolic murmur is present with a grade of 2/6  Pulmonary:      Breath sounds: Normal breath sounds  No wheezing or rhonchi  Abdominal:      General: Bowel sounds are normal    Musculoskeletal:      Right lower leg: No edema  Left lower leg: No edema  Skin:     Findings: No lesion  Neurological:      General: No focal deficit present  Mental Status: He is alert  Mental status is at baseline     Psychiatric:         Mood and Affect: Mood normal          Pertinent Laboratory/Diagnostic Studies:    Laboratory studies reviewed personally by Jael Wells MD    BMP:   Lab Results   Component Value Date    SODIUM 139 10/30/2017    K 5 0 10/30/2017     10/30/2017    CO2 26 10/30/2017    BUN 24 10/30/2017    CREATININE 1 05 10/30/2017    GLUC 85 10/30/2017    CALCIUM 9 9 10/30/2017     CBC:  Lab Results   Component Value Date    WBC 8 40 10/30/2017    WBC 7 20 09/17/2014    RBC 4 67 10/30/2017    RBC 4 62 09/17/2014    HGB 15 6 10/30/2017    HGB 14 9 09/17/2014    HCT 44 4 10/30/2017    HCT 43 3 09/17/2014    MCV 95 10/30/2017    MCV 94 09/17/2014    MCH 33 4 10/30/2017    MCH 32 3 09/17/2014    RDW 12 4 10/30/2017    RDW 13 1 09/17/2014     10/30/2017     09/17/2014     Coags:    Lipid Profile:   Lab Results   Component Value Date    CHOL 123 03/21/2014     Lab Results   Component Value Date    HDL 41 09/20/2017     Lab Results   Component Value Date    LDLCALC 82 09/20/2017     Lab Results   Component Value Date    TRIG 333 (H) 09/20/2017      Lab Results   Component Value Date    NLM2JAOGXSML 0 687 03/21/2014     Lab Results   Component Value Date    ALT 41 11/23/2021    AST 17 11/23/2021       Imaging Studies: No results found  Claudeen Osler, MD, [facility]    Portions of the record may have been created with voice recognition software  Occasional wrong word or "sound a like" substitutions may have occurred due to the inherent limitations of voice recognition software  Read the chart carefully and recognize, using context, where substitutions have occurred

## 2022-06-22 NOTE — ASSESSMENT & PLAN NOTE
BP Readings from Last 3 Encounters:   06/22/22 116/74   04/18/22 134/84   04/08/22 138/80       Advised home blood pressure monitoring  Continue current medications  Lifestyle modification measures to help blood pressure control include:increased physical activity, low-salt diet rich in fruits and vegetables, avoidance of alcohol intake and maintaining healthy weight

## 2022-06-22 NOTE — ASSESSMENT & PLAN NOTE
Ascending aorta 44 mm on CT chest   Indexed aortic size probably in the normal range given his body habitus  Repeat CT planned for next year for ensuring stability of the aortic root size

## 2022-06-22 NOTE — ASSESSMENT & PLAN NOTE
Continue strategy of rate control and anticoagulation  Currently has minimal symptoms  Avoid any NSAIDs or aspirin  Previously we had discussed the options of ablation, cardioversion, and ongoing medical therapy with beta-blockers and anticoagulation  Recent low risk nuclear stress test with very low calcium score

## 2022-06-22 NOTE — PATIENT INSTRUCTIONS
Continue current cardiac medications  CT chest next year  Cardiac risk can be lowered with increase in physical activity, plant-based or Mediterranean style start diet, and avoidance of tobacco products  Report any worsening cardiac symptoms (chest pain,shortness of breath with exertion or fainting)  Keep follow-up as planned with primary care and other specialists  [address]  diet: A heart-healthy eating plan    What is the Mediterranean diet? The Mediterranean diet is a way of eating based on the traditional cuisine of countries bordering the [facility]  While there is no single definition of the Mediterranean diet, it is typically high in vegetables, fruits, whole grains, beans, nut and seeds, and olive oil  The main components of Mediterranean diet include:    Daily consumption of vegetables, fruits, whole grains and healthy fats  Weekly intake of fish, poultry, beans and eggs  Moderate portions of dairy products  Limited intake of red meat  Other important elements of the Mediterranean diet are sharing meals with family and friends    Plant based, not meat based  The foundation of the Mediterranean diet is vegetables, fruits, herbs, nuts, beans and whole grains  Meals are built around these plant-based foods  Small amounts of dairy, poultry and eggs are also central to the Mediterranean Diet, as is seafood  In contrast, red meat is eaten only occasionally  Healthy fats  Healthy fats are a mainstay of the Mediterranean diet  They're eaten instead of less healthy fats, such as saturated and trans fats, which contribute to heart disease  Olive oil is the primary source of added fat in the Mediterranean diet  Olive oil provides monounsaturated fat, which has been found to lower total cholesterol and low-density lipoprotein (LDL or "bad") cholesterol levels  Nuts and seeds also contain monounsaturated fat  Fish are also important in the [address] [facility]  Fatty fish -- such as mackerel, herring, sardines, albacore tuna, salmon and lake trout -- are rich in omega-3 fatty acids, a type of polyunsaturated fat that may reduce inflammation in the body  Omega-3 fatty acids also help decrease triglycerides, reduce blood clotting, and decrease the risk of stroke and heart failure  Eating the [address]  Interested in trying the [address]? These tips will help you get started:    Eat more fruits and vegetables  Aim for 7 to 10 servings a day of fruit and vegetables  Opt for whole grains  Switch to whole-grain bread, cereal and pasta  Romeo with other whole grains  Use healthy fats  Try olive oil as a replacement for butter when cooking  Instead of putting butter or margarine on bread, try dipping it in flavored olive oil  Eat more seafood  Eat fish twice a week  Fresh or water-packed tuna, salmon, trout, mackerel and herring are healthy choices  Grilled fish tastes good and requires little cleanup  Avoid deep-fried fish  Reduce red meat  Substitute fish, poultry or beans for meat  If you eat meat, make sure it's lean and keep portions small  Spice it up  Herbs and spices boost flavor and lessen the need for salt  The Mediterranean diet is a delicious and healthy way to eat  Many people who switch to this style of eating say they'll never eat any other way      Source: http://www [employer] org/

## 2022-08-12 ENCOUNTER — OFFICE VISIT (OUTPATIENT)
Dept: INTERNAL MEDICINE CLINIC | Facility: CLINIC | Age: 67
End: 2022-08-12
Payer: MEDICARE

## 2022-08-12 VITALS
HEIGHT: 77 IN | OXYGEN SATURATION: 99 % | SYSTOLIC BLOOD PRESSURE: 124 MMHG | HEART RATE: 75 BPM | WEIGHT: 249.6 LBS | DIASTOLIC BLOOD PRESSURE: 82 MMHG | TEMPERATURE: 98.2 F | BODY MASS INDEX: 29.47 KG/M2

## 2022-08-12 DIAGNOSIS — L03.115 CELLULITIS AND ABSCESS OF RIGHT LEG: Primary | ICD-10-CM

## 2022-08-12 DIAGNOSIS — L02.415 CELLULITIS AND ABSCESS OF RIGHT LEG: Primary | ICD-10-CM

## 2022-08-12 PROCEDURE — 99213 OFFICE O/P EST LOW 20 MIN: CPT | Performed by: HOSPITALIST

## 2022-08-12 RX ORDER — DOXYCYCLINE HYCLATE 100 MG/1
100 CAPSULE ORAL EVERY 12 HOURS SCHEDULED
Qty: 10 CAPSULE | Refills: 0 | Status: SHIPPED | OUTPATIENT
Start: 2022-08-12 | End: 2022-08-17

## 2022-08-12 NOTE — PROGRESS NOTES
INTERNAL MEDICINE FOLLOW-UP OFFICE VISIT  [facility] INTERNAL MEDICINE [place]    NAME: Kaur Martines  AGE: 77 y o  SEX: male  : 1955     DATE: 2022     Assessment and Plan:     1  Cellulitis and abscess of right leg  Mild  Without any underlying abscess  Given that he is diabetic and the area looks red and inflamed will treat him with a 5 day course of doxycycline  Patient has been asked to call the clinic back if there is any worsening redness, development of fever or any discharge   - doxycycline hyclate (VIBRAMYCIN) 100 mg capsule; Take 1 capsule (100 mg total) by mouth every 12 (twelve) hours for 5 days  Dispense: 10 capsule; Refill: 0         Chief Complaint:     Chief Complaint   Patient presents with    Follow-up     Right leg redness since last Tuesday        History of Present Illness:     Vita Tejeda is here for cellulitis involving the outer part of his right leg  He scraped the side of his right leg against his truck while trying to get in-he sustained to to abrasions which is now red, inflamed without any discharge or fever  He is diabetic with good control of his blood sugars      The following portions of the patient's history were reviewed and updated as appropriate: allergies, current medications, past family history, past medical history, past social history, past surgical history and problem list      Review of Systems:     Review of Systems all other review of symptoms negative unless specified otherwise     Problem List:     Patient Active Problem List   Diagnosis    Medicare annual wellness visit, subsequent    Encounter for colonoscopy due to history of colonic polyp    Type 2 diabetes mellitus without complication, without long-term current use of insulin ([address] )    Elbow pain, chronic, right    Persistent atrial fibrillation ([address] )    Essential hypertension    Mixed dyslipidemia    Ascending aorta enlargement (HCC)        Objective:     BP 124/82 (BP Location: Left arm, Patient Position: Sitting, Cuff Size: Standard)   Pulse 75   Temp 98 2 °F (36 8 °C) (Tympanic)   Ht 6' 4 5" (1 943 m)   Wt 113 kg (249 lb 9 6 oz)   SpO2 99%   BMI 29 99 kg/m²     Physical Exam   General Appearance:    Alert, cooperative, no distress   Head:    Normocephalic, without obvious abnormality, atraumatic   Eyes:    PERRL, conjunctiva/corneas clear, EOM's intact, fundi     benign, both eyes        Neck:   Supple, no adenopathy, no JVD   Back:     Symmetric, no curvature, ROM normal, no CVA tenderness   Lungs:     Clear to auscultation bilaterally, no wheezing or rhonchi   Heart:    Regular rate and rhythm, S1 and S2 normal, no murmur, rub   or gallop   Abdomen:     Soft, non-tender, bowel sounds active    Extremities: -outer aspect of the right calf-mild redness and tenderness over an area of linear abrasion without any discharge   Psych: Normal affect   Neurologic:   CNII-XII intact   Normal strength, sensation and reflexes       Throughout       Pertinent Laboratory/Diagnostic Studies:        Halina Nguyen MD  [facility] INTERNAL MEDICINE [place]

## 2022-09-09 LAB — HBA1C MFR BLD HPLC: 6.1 %

## 2022-10-12 PROBLEM — Z00.00 MEDICARE ANNUAL WELLNESS VISIT, SUBSEQUENT: Status: RESOLVED | Noted: 2021-07-23 | Resolved: 2022-10-12

## 2022-11-18 ENCOUNTER — OFFICE VISIT (OUTPATIENT)
Dept: INTERNAL MEDICINE CLINIC | Facility: CLINIC | Age: 67
End: 2022-11-18

## 2022-11-18 VITALS
BODY MASS INDEX: 28.41 KG/M2 | SYSTOLIC BLOOD PRESSURE: 134 MMHG | OXYGEN SATURATION: 100 % | WEIGHT: 240.6 LBS | TEMPERATURE: 98.1 F | DIASTOLIC BLOOD PRESSURE: 80 MMHG | HEIGHT: 77 IN | HEART RATE: 79 BPM

## 2022-11-18 DIAGNOSIS — B00.9 HSV-1 (HERPES SIMPLEX VIRUS 1) INFECTION: ICD-10-CM

## 2022-11-18 DIAGNOSIS — U07.1 COVID-19: Primary | ICD-10-CM

## 2022-11-18 DIAGNOSIS — E11.9 TYPE 2 DIABETES MELLITUS WITHOUT COMPLICATION, WITHOUT LONG-TERM CURRENT USE OF INSULIN (HCC): ICD-10-CM

## 2022-11-18 DIAGNOSIS — I10 ESSENTIAL HYPERTENSION: ICD-10-CM

## 2022-11-18 DIAGNOSIS — J40 BRONCHITIS: ICD-10-CM

## 2022-11-18 DIAGNOSIS — I48.19 PERSISTENT ATRIAL FIBRILLATION (HCC): ICD-10-CM

## 2022-11-18 DIAGNOSIS — E78.2 MIXED DYSLIPIDEMIA: ICD-10-CM

## 2022-11-18 LAB
SARS-COV-2 AG UPPER RESP QL IA: POSITIVE
VALID CONTROL: ABNORMAL

## 2022-11-18 RX ORDER — BENZONATATE 100 MG/1
100 CAPSULE ORAL 3 TIMES DAILY PRN
Qty: 20 CAPSULE | Refills: 0 | Status: SHIPPED | OUTPATIENT
Start: 2022-11-18

## 2022-11-18 NOTE — ASSESSMENT & PLAN NOTE
Rate controlled on lopressor 25 mg bid and eliquis 5 mg bid  Following up with cardiology for potential ablation procedure

## 2022-11-18 NOTE — PROGRESS NOTES
INTERNAL MEDICINE FOLLOW-UP OFFICE VISIT  [facility] INTERNAL MEDICINE [place]    NAME: Deena Ortiz  AGE: 79 y o  SEX: male  : 1955     DATE: 2022     Assessment and Plan:     COVID-19  Patient reports severe nonproductive cough and chills that started on 22  Symptoms had kept patient up at night and he called off of work on the following Wednesday  Patient reports he has been feeling better since then and cough has for the most part resolved  Patient tested positive for covid 19 in the office on rapid test  No shortness of breath  No chest pain  No GI symptoms  Patient is afebrile on presentation  Lungs are clear on auscultation  O2 saturation is 98% on room air  Plan: Will treat for mild URI symptoms  tesslon pearles/cough drops prn  Robitussin cough syrup  Tylenol 650 mg q6h as needed for fever, chills, body aches  Keep well hydrated with water or zero sugar gatorade  Patient should continue to take home tests every 48 hours until they are negative x2  He should self isolate until that time  HSV-1 (herpes simplex virus 1) infection  Patient complains of gum pain since his cough and chills have started  Physical exam reveals crusting vesicles on lower lip and gingiva    Plan:  oragel recommended twice per day as needed    Type 2 diabetes mellitus without complication, without long-term current use of insulin (HCC)    Lab Results   Component Value Date    HGBA1C 6 1 2022     Well controlled on metformin 500 mg  Diabetic foot exam due in January     Persistent atrial fibrillation (HCC)  Rate controlled on lopressor 25 mg bid and eliquis 5 mg bid  Following up with cardiology for potential ablation procedure    Essential hypertension  Well controlled on lisinopril 5 mg   No changes at this appt      No follow-ups on file       Chief Complaint:     Chief Complaint   Patient presents with   • Follow-up     Chills, slight cough started , improved since        History of Present Illness:     Patient presents with a chief complaint of nonproductive cough and chills that began on Sunday that had kept him awake through the night  Patient says he went to work on Monday and Tuesday but called off on Wednesday  He says since then his symptoms have been improving and his cough has reduced significantly  Patient denies ever feeling feverish, short of breath, having chest pain, extremity pain or swelling, lightheadedness or loss of consciousness  He has had no associated GI symptoms and denies abdominal pain, nausea, vomiting or diarrhea  Today he is also complaining of some pain in his gums that became noticeable around the same time his respiratory symptoms began  The following portions of the patient's history were reviewed and updated as appropriate: allergies, current medications, past family history, past medical history, past social history, past surgical history and problem list      Review of Systems:     Review of Systems   Constitutional: Positive for chills  Negative for fever  HENT: Negative for ear pain, sore throat and trouble swallowing  Eyes: Negative for pain and visual disturbance  Respiratory: Positive for cough  Negative for chest tightness, shortness of breath and wheezing  Cardiovascular: Negative for chest pain, palpitations and leg swelling  Gastrointestinal: Negative for abdominal pain, blood in stool, constipation, diarrhea, nausea and vomiting  Genitourinary: Negative for dysuria and hematuria  Musculoskeletal: Negative for arthralgias, back pain and joint swelling  Skin: Negative for color change and rash  Neurological: Negative for dizziness, seizures, syncope, weakness, light-headedness and numbness  All other systems reviewed and are negative         Problem List:     Patient Active Problem List   Diagnosis   • Encounter for colonoscopy due to history of colonic polyp   • Type 2 diabetes mellitus without complication, without long-term current use of insulin (HCC)   • Elbow pain, chronic, right   • Persistent atrial fibrillation (HCC)   • Essential hypertension   • Mixed dyslipidemia   • Ascending aorta enlargement (HCC)   • COVID-19   • HSV-1 (herpes simplex virus 1) infection        Objective:     /80 (BP Location: Left arm, Patient Position: Sitting, Cuff Size: Standard)   Pulse 79   Temp 98 1 °F (36 7 °C) (Tympanic)   Ht 6' 4 5" (1 943 m)   Wt 109 kg (240 lb 9 6 oz)   SpO2 100%   BMI 28 91 kg/m²     Physical Exam  Constitutional:       General: He is not in acute distress  Appearance: Normal appearance  He is normal weight  He is not ill-appearing or toxic-appearing  HENT:      Head: Normocephalic and atraumatic  Mouth/Throat:      Mouth: Mucous membranes are moist       Pharynx: Oropharynx is clear  No oropharyngeal exudate  Comments: Crusting vesicle noted on bottom lip  Eyes:      General: No scleral icterus  Extraocular Movements: Extraocular movements intact  Conjunctiva/sclera: Conjunctivae normal       Pupils: Pupils are equal, round, and reactive to light  Cardiovascular:      Rate and Rhythm: Normal rate  Rhythm irregular  Pulses: Normal pulses  Heart sounds: Normal heart sounds  No murmur heard  No gallop  Pulmonary:      Effort: Pulmonary effort is normal  No respiratory distress  Breath sounds: Normal breath sounds  No stridor  No wheezing, rhonchi or rales  Chest:      Chest wall: No tenderness  Abdominal:      General: Abdomen is flat  Bowel sounds are normal  There is no distension  Palpations: Abdomen is soft  Tenderness: There is no abdominal tenderness  Musculoskeletal:         General: Normal range of motion  Cervical back: Normal range of motion  Right lower leg: No edema  Left lower leg: No edema  Skin:     General: Skin is warm  Coloration: Skin is not jaundiced or pale        Findings: No rash  Neurological:      General: No focal deficit present  Mental Status: He is alert and oriented to person, place, and time  Mental status is at baseline  Cranial Nerves: No cranial nerve deficit  Sensory: No sensory deficit  Motor: No weakness     Psychiatric:         Mood and Affect: Mood normal          Behavior: Behavior normal            Gorge Moy MD  [facility] INTERNAL MEDICINE [place]

## 2022-11-18 NOTE — ASSESSMENT & PLAN NOTE
Lab Results   Component Value Date    HGBA1C 6 1 09/09/2022     Well controlled on metformin 500 mg  Diabetic foot exam due in January

## 2022-11-18 NOTE — ASSESSMENT & PLAN NOTE
Patient reports severe nonproductive cough and chills that started on 12/13/22  Symptoms had kept patient up at night and he called off of work on the following Wednesday  Patient reports he has been feeling better since then and cough has for the most part resolved  Patient tested positive for covid 19 in the office on rapid test  No shortness of breath  No chest pain  No GI symptoms  Patient is afebrile on presentation  Lungs are clear on auscultation  O2 saturation is 98% on room air  Plan: Will treat for mild URI symptoms  tesslon pearles/cough drops prn  Robitussin cough syrup  Tylenol 650 mg q6h as needed for fever, chills, body aches  Keep well hydrated with water or zero sugar gatorade  Patient should continue to take home tests every 48 hours until they are negative x2  He should self isolate until that time

## 2022-11-18 NOTE — ASSESSMENT & PLAN NOTE
Patient complains of gum pain since his cough and chills have started  Physical exam reveals crusting vesicles on lower lip and gingiva    Plan:  oragel recommended twice per day as needed

## 2022-12-02 ENCOUNTER — OFFICE VISIT (OUTPATIENT)
Dept: CARDIOLOGY CLINIC | Facility: CLINIC | Age: 67
End: 2022-12-02

## 2022-12-02 VITALS
HEIGHT: 77 IN | DIASTOLIC BLOOD PRESSURE: 76 MMHG | WEIGHT: 244.2 LBS | BODY MASS INDEX: 28.83 KG/M2 | HEART RATE: 79 BPM | SYSTOLIC BLOOD PRESSURE: 128 MMHG | OXYGEN SATURATION: 99 %

## 2022-12-02 DIAGNOSIS — I77.89 ASCENDING AORTA ENLARGEMENT (HCC): ICD-10-CM

## 2022-12-02 DIAGNOSIS — E78.2 MIXED DYSLIPIDEMIA: ICD-10-CM

## 2022-12-02 DIAGNOSIS — I10 ESSENTIAL HYPERTENSION: ICD-10-CM

## 2022-12-02 DIAGNOSIS — I48.19 PERSISTENT ATRIAL FIBRILLATION (HCC): Primary | ICD-10-CM

## 2022-12-02 DIAGNOSIS — U07.1 COVID-19: ICD-10-CM

## 2022-12-02 NOTE — PROGRESS NOTES
[facility] [place]  [address] [facility] [address]  [address]  [place] [address]  Phone#  [phone]  Fax#  [phone]  [facility]'s Cardiology Office Follow-up Visit             NAME: Juan Portillo  AGE: 79 y o  SEX: male   : 1955   MRN: [id number]    DATE: 2022  TIME: 9:55 AM    Cardiology Problem list:  Persistent atrial fibrillation:  Eliquis and metoprolol  3/22:  Echo:  EF 60, mild LVH, LA enlargement, mild MR  : Echo: EF 59, small apical infarct  :  Calcium score 15  Aortic enlargement: :  CT  Ascending aorta 44 mm  BSA 2 5  Indexed aortic root size 1 7, possibly normal  Diabetes with dyslipidemia:  Managed at the [place]  Hypertension    Visit diagnoses:  1  Persistent atrial fibrillation (HCC)  POCT ECG      2  Ascending aorta enlargement (HCC)  CT chest wo contrast      3  Essential hypertension        4  Mixed dyslipidemia        5  COVID-19            Assessment and plan:  Paroxysmal atrial fibrillation:  Managed by rate control and anticoagulation  Minimally symptomatic  We again discussed the options of ablation, cardioversion, and ongoing medical therapy with beta-blockers and anticoagulation  Recent low risk nuclear stress test with very low calcium score  He remains asymptomatic from the atrial fibrillation  LV function is preserved  He has had no rapid rate episodes  For now he will continue current medical therapy with anticoagulation beta-blockers  His chads Vasc score is 3  He will let us know if he wants to proceed with EP evaluation  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  Hypertension:  BP Readings from Last 3 Encounters:   22 128/76   22 134/80   22 124/82   Continue  home blood pressure monitoring  Continue current medications    Lifestyle modification measures to help blood pressure control include:increased physical activity, low-salt diet rich in fruits and vegetables, avoidance of alcohol intake and maintaining healthy weight  Mixed dyslipidemia  Last LDL in the system from 2019 was 72  Lipids are managed by primary care-Labs done every 6 months at the [place]  Tolerating atorvastatin         Ascending aorta enlargement (HCC)  Ascending aorta previously measured as 44 mm on CT chest   Indexed aortic size probably in the normal range given his body habitus  Repeat CT planned for next year for ensuring stability of the aortic root size  I have scheduled for this to be done in May 2023  Shortness of breath  Had Covid recently  Had some cough and shortness of breath  No fever  Symptoms are improving  Recent echo showed preserved EF with mild LVH  Mild MR was noted  Recent nuclear stress test showed no definite ischemia  Mid to apical fixed defect, most likely artifactual   Wall motion was normal   No significant coronary calcification noted  No heart failure symptoms  Covid 19:  Positive on 11/18/22  Had some cough, shortness of breath and body ache  Feels better now  Chief Complaint   Patient presents with   • Follow-up     6 month f/u   • Shortness of Breath     Sometimes on exertion and rest       HPI:    Adalberto Frederick is a 79y o -year-old male who presents to the cardiology clinic for follow up for the above-listed problems  He has history of atrial fibrillation with controlled ventricular rate  Previous calcium score was only 15  CT chest also showed ascending aortic enlargement at 44 mm  However his height is 6 ft 5 in  His body surface area is 2 53  Based on that his indexed aortic size is 17mm/sq  m  which probably is in the normal range  Patient is doing well from a cardiovascular standpoint  No recent hospitalizations  Current medications reviewed  Reports compliance to medicines  No side effects reported  Had Covid 2 weeks earlier  He developed some cough, some shortness of breath  Did not require hospitalization  He also had some generalized body aches    He is feeling much better now  Denies chest pain on exertion  Reports  shortness of breath, that is intermittent  Denies sustained palpitations  No syncope  Functional capacity unchanged  EKG today shows poor data quality  Rate is 79, atrial fibrillation noted  Further interpretation not possible  Past history, family history, social history, current medications, vital signs, recent lab and imaging studies and  prior cardiology studies reviewed independently on this visit      Allergies   Allergen Reactions   • Other Shortness Of Breath     Cats and dogs       Current Outpatient Medications:   •  apixaban (Eliquis) 5 mg, Take 1 tablet (5 mg total) by mouth 2 (two) times a day, Disp: 60 tablet, Rfl: 2  •  atorvastatin (LIPITOR) 20 mg tablet, Take 20 mg by mouth daily, Disp: , Rfl:   •  benzonatate (TESSALON PERLES) 100 mg capsule, Take 1 capsule (100 mg total) by mouth 3 (three) times a day as needed for cough, Disp: 20 capsule, Rfl: 0  •  lisinopril (ZESTRIL) 5 mg tablet, TAKE ONE TABLET BY MOUTH EVERY DAY FOR BLOOD PRESSURE/HEART, Disp: , Rfl:   •  metFORMIN (GLUCOPHAGE) 500 mg tablet, Take 1,000 mg by mouth, Disp: , Rfl:   •  metoprolol tartrate (LOPRESSOR) 25 mg tablet, Take 0 5 tablets by mouth 2 (two) times a day, Disp: , Rfl:   •  Multiple Vitamins-Minerals (CENTRUM ADULTS PO), Take 50 mg by mouth, Disp: , Rfl:   •  omeprazole (PriLOSEC) 20 mg delayed release capsule, , Disp: , Rfl:   •  sildenafil (VIAGRA) 100 mg tablet, Take 100 mg by mouth (Patient not taking: Reported on 11/18/2022), Disp: , Rfl:     Patient Active Problem List   Diagnosis   • Encounter for colonoscopy due to history of colonic polyp   • Type 2 diabetes mellitus without complication, without long-term current use of insulin (HCC)   • Elbow pain, chronic, right   • Persistent atrial fibrillation (HCC)   • Essential hypertension   • Mixed dyslipidemia   • Ascending aorta enlargement (HCC)   • COVID-19   • HSV-1 (herpes simplex virus 1) infection      Past Medical History:   Diagnosis Date   • Appendicitis    • Atrial [address] ([facility] [address] )    • Colon polyp    • Diabetes mellitus ([facility] [address] )    • Hyperlipidemia    • Hypertension      Past Surgical History:   Procedure Laterality Date   • APPENDECTOMY     • CHOLECYSTECTOMY     • COLONOSCOPY       Family History   Problem Relation Age of Onset   • Throat cancer Father    • Prostate cancer Brother    • Alcohol abuse Brother      Social History   reports that he has never smoked  He has never used smokeless tobacco  He reports that he does not currently use alcohol  He reports that he does not use drugs  Review of Systems   Constitutional: Negative  HENT: Negative  Eyes: Negative  Respiratory: Positive for shortness of breath  Negative for cough  Cardiovascular: Negative for chest pain and palpitations  Gastrointestinal: Negative  Endocrine: Negative  Genitourinary: Negative  Musculoskeletal: Negative  Skin: Negative  Allergic/Immunologic: Negative  Neurological: Negative  Hematological: Does not bruise/bleed easily  Psychiatric/Behavioral: Negative  Objective:     Vitals:    12/02/22 0925   BP: 128/76   Pulse: 79   SpO2: 99%     Wt Readings from Last 3 Encounters:   12/02/22 111 kg (244 lb 3 2 oz)   11/18/22 109 kg (240 lb 9 6 oz)   08/12/22 113 kg (249 lb 9 6 oz)     Pulse Readings from Last 3 Encounters:   12/02/22 79   11/18/22 79   08/12/22 75     BP Readings from Last 3 Encounters:   12/02/22 128/76   11/18/22 134/80   08/12/22 124/82     Physical Exam  Constitutional:       General: He is not in acute distress  HENT:      Mouth/Throat:      Mouth: Mucous membranes are moist    Eyes:      Conjunctiva/sclera: Conjunctivae normal    Neck:      Vascular: No carotid bruit or JVD  Cardiovascular:      Rate and Rhythm: Rhythm irregularly irregular  Heart sounds: S1 normal and S2 normal  Murmur heard      Systolic murmur is present with a grade of 2/6   Pulmonary:      Breath sounds: Normal breath sounds  No wheezing or rhonchi  Chest:      Chest wall: No tenderness  Abdominal:      General: Bowel sounds are normal    Musculoskeletal:      Right lower leg: No edema  Left lower leg: No edema  Skin:     General: Skin is warm  Findings: No lesion  Neurological:      General: No focal deficit present  Mental Status: He is alert  Mental status is at baseline  Psychiatric:         Mood and Affect: Mood normal            Laurie Martins MD, [facility] [place]    Portions of the record may have been created with voice recognition software  Occasional wrong word or "sound alike" substitutions may have occurred due to the inherent limitations of voice recognition software  Read the chart carefully and recognize, using context, where substitutions have occurred  Please reach out to me directly for any clarifications

## 2022-12-02 NOTE — PATIENT INSTRUCTIONS
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

## 2022-12-23 ENCOUNTER — TELEPHONE (OUTPATIENT)
Dept: CARDIOLOGY CLINIC | Facility: CLINIC | Age: 67
End: 2022-12-23

## 2022-12-23 NOTE — TELEPHONE ENCOUNTER
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

## 2023-01-11 ENCOUNTER — VBI (OUTPATIENT)
Dept: ADMINISTRATIVE | Facility: OTHER | Age: 68
End: 2023-01-11

## 2023-01-19 ENCOUNTER — OFFICE VISIT (OUTPATIENT)
Dept: INTERNAL MEDICINE CLINIC | Facility: CLINIC | Age: 68
End: 2023-01-19

## 2023-01-19 VITALS
TEMPERATURE: 98.2 F | SYSTOLIC BLOOD PRESSURE: 110 MMHG | HEIGHT: 77 IN | HEART RATE: 78 BPM | WEIGHT: 249 LBS | OXYGEN SATURATION: 98 % | DIASTOLIC BLOOD PRESSURE: 85 MMHG | BODY MASS INDEX: 29.4 KG/M2

## 2023-01-19 DIAGNOSIS — I10 ESSENTIAL HYPERTENSION: ICD-10-CM

## 2023-01-19 DIAGNOSIS — E11.9 TYPE 2 DIABETES MELLITUS WITHOUT COMPLICATION, WITHOUT LONG-TERM CURRENT USE OF INSULIN (HCC): ICD-10-CM

## 2023-01-19 DIAGNOSIS — N52.9 ERECTILE DYSFUNCTION, UNSPECIFIED ERECTILE DYSFUNCTION TYPE: ICD-10-CM

## 2023-01-19 DIAGNOSIS — I48.19 PERSISTENT ATRIAL FIBRILLATION (HCC): ICD-10-CM

## 2023-01-19 DIAGNOSIS — L91.8 SKIN TAG: ICD-10-CM

## 2023-01-19 DIAGNOSIS — I77.89 ASCENDING AORTA ENLARGEMENT (HCC): ICD-10-CM

## 2023-01-19 DIAGNOSIS — Z00.00 MEDICARE ANNUAL WELLNESS VISIT, INITIAL: Primary | ICD-10-CM

## 2023-01-19 DIAGNOSIS — E78.2 MIXED DYSLIPIDEMIA: ICD-10-CM

## 2023-01-19 PROBLEM — H61.23 IMPACTED CERUMEN, BILATERAL: Status: ACTIVE | Noted: 2023-01-19

## 2023-01-19 PROBLEM — Z12.11 ENCOUNTER FOR SCREENING FOR MALIGNANT NEOPLASM OF COLON: Status: ACTIVE | Noted: 2023-01-19

## 2023-01-19 PROBLEM — Z90.49 HISTORY OF APPENDECTOMY: Status: ACTIVE | Noted: 2023-01-19

## 2023-01-19 PROBLEM — M10.9 GOUT: Status: ACTIVE | Noted: 2023-01-19

## 2023-01-19 RX ORDER — TADALAFIL 10 MG/1
10 TABLET ORAL DAILY PRN
Qty: 10 TABLET | Refills: 0 | Status: SHIPPED | OUTPATIENT
Start: 2023-01-19

## 2023-01-19 NOTE — ASSESSMENT & PLAN NOTE
Lab Results   Component Value Date    HGBA1C 6 1 09/09/2022     PLAN:   Kidney health Eval CARE GAP CMP   A1C in 2 months  Continue life style modificaitons     Continue Metform 1000mg PO daily

## 2023-01-19 NOTE — PROGRESS NOTES
Assessment and Plan:     Problem List Items Addressed This Visit        Endocrine    Type 2 diabetes mellitus without complication, without long-term current use of insulin ([facility] [address] ) - Primary       Lab Results   Component Value Date    HGBA1C 6 1 09/09/2022     PLAN:   Kidney health Eval CARE GAP CMP   A1C in 2 months  Continue life style modificaitons  Continue Metform 1000mg PO daily               Cardiovascular and Mediastinum    Essential hypertension     Present on admission    PLAN:  Continue home medications  Persistent atrial fibrillation (HCC)     [id number]  Pt is followed closely by cardiology    PLAN:  As per cardiology          Ascending aorta enlargement ([facility] [address] )     Present on admission    PLAN:  Followed by cardiology               Other    Mixed dyslipidemia     Present on admission    PLAN:  Continue home medications  Erectile dysfunction     Pt has hx of ED  Pt notes sildenafil 100mg PNR not having satisfactory results  PLAN:  Education on proper use provided   Tadalafil 10mg PRN provided: Told to take one 3 hrs before sexual activity  If pt still doesn't get results take 2  Preventive health issues were discussed with patient, and age appropriate screening tests were ordered as noted in patient's After Visit Summary  Personalized health advice and appropriate referrals for health education or preventive services given if needed, as noted in patient's After Visit Summary  History of Present Illness:     Patient presents for a Medicare Wellness Visit    Pt is here for medicare wellness visit  Only notes minimal pain on right ankle, right elbow, and has a skin tag on the back  Pt notes never having dermatology visit  Patient Care Team:  Rupal Brand MD as PCP - General (Internal Medicine)     Review of Systems:     Review of Systems   Constitutional: Negative for fatigue, fever and unexpected weight change  HENT: Negative  Eyes: Negative  Respiratory: Negative  Cardiovascular: Positive for palpitations  Negative for chest pain and leg swelling  Gastrointestinal: Negative  Endocrine: Negative  Genitourinary: Negative  Musculoskeletal: Positive for arthralgias  Skin: Negative  Allergic/Immunologic:        Pet dander   Neurological: Negative  Psychiatric/Behavioral: Negative           Problem List:     Patient Active Problem List   Diagnosis   • Encounter for colonoscopy due to history of colonic polyp   • Type 2 diabetes mellitus without complication, without long-term current use of insulin (HCC)   • Elbow pain, chronic, right   • Persistent atrial fibrillation ([facility] [address] )   • Essential hypertension   • Mixed dyslipidemia   • Ascending aorta enlargement (HCC)   • COVID-19   • HSV-1 (herpes simplex virus 1) infection   • Gout   • Erectile dysfunction   • History of appendectomy   • Impacted cerumen, bilateral   • Encounter for screening for malignant neoplasm of colon   • Skin tag      Past Medical and Surgical History:     Past Medical History:   Diagnosis Date   • Appendicitis    • Atrial fibrillation ([facility] [address] )    • Colon polyp    • Diabetes mellitus ([facility] )    • Hyperlipidemia    • Hypertension      Past Surgical History:   Procedure Laterality Date   • APPENDECTOMY     • CHOLECYSTECTOMY     • COLONOSCOPY        Family History:     Family History   Problem Relation Age of Onset   • Throat cancer Father    • Prostate cancer Brother    • Alcohol abuse Brother       Social History:     Social History     Socioeconomic History   • Marital status:      Spouse name: None   • Number of children: None   • Years of education: None   • Highest education level: None   Occupational History   • None   Tobacco Use   • Smoking status: Never   • Smokeless tobacco: Never   Vaping Use   • Vaping Use: Never used   Substance and Sexual Activity   • Alcohol use: Not Currently   • Drug use: Never   • Sexual activity: Not Currently     Partners: Female Other Topics Concern   • None   Social History Narrative   • None     Social Determinants of Health     Financial Resource Strain: Low Risk    • Difficulty of Paying Living Expenses: Not hard at all   Food Insecurity: Not on file   Transportation Needs: No Transportation Needs   • Lack of Transportation (Medical): No   • Lack of Transportation (Non-Medical): No   Physical Activity: Not on file   Stress: Not on file   Social Connections: Not on file   Intimate Partner Violence: Not on file   Housing Stability: Not on file      Medications and Allergies:     Current Outpatient Medications   Medication Sig Dispense Refill   • atorvastatin (LIPITOR) 20 mg tablet Take 20 mg by mouth daily     • lisinopril (ZESTRIL) 5 mg tablet TAKE ONE TABLET BY MOUTH EVERY DAY FOR BLOOD PRESSURE/HEART     • metFORMIN (GLUCOPHAGE) 500 mg tablet Take 1,000 mg by mouth     • metoprolol tartrate (LOPRESSOR) 25 mg tablet Take 0 5 tablets by mouth 2 (two) times a day     • Multiple Vitamins-Minerals (CENTRUM ADULTS PO) Take 50 mg by mouth     • omeprazole (PriLOSEC) 20 mg delayed release capsule      • apixaban (Eliquis) 5 mg Take 1 tablet (5 mg total) by mouth 2 (two) times a day 60 tablet 2   • benzonatate (TESSALON PERLES) 100 mg capsule Take 1 capsule (100 mg total) by mouth 3 (three) times a day as needed for cough (Patient not taking: Reported on 1/19/2023) 20 capsule 0     No current facility-administered medications for this visit  Allergies   Allergen Reactions   • Other Shortness Of Breath     Cats and dogs      Immunizations:     Immunization History   Administered Date(s) Administered   • COVID-19 MODERNA VACC 0 5 ML IM 02/13/2021, 03/13/2021, 01/18/2022   • INFLUENZA 09/01/2018, 10/18/2021   • Influenza Quadrivalent Preservative Free 3 years and older IM 10/05/2020   • Influenza, Seasonal Vaccine, Quadrivalent, Adjuvanted,   5e 10/18/2021, 10/07/2022   • Pneumococcal Polysaccharide PPV23 09/20/2017, 10/19/2020   • Tdap 09/27/2018   • Zoster Vaccine Recombinant 10/05/2020, 01/11/2021      Health Maintenance:         Topic Date Due   • Colorectal Cancer Screening  02/10/2027   • Hepatitis C Screening  Completed         Topic Date Due   • Pneumococcal Vaccine: 65+ Years (2 - PCV) 10/19/2021   • COVID-19 Vaccine (4 - Booster for Moderna series) 03/15/2022   • Influenza Vaccine (1) 09/01/2022      Medicare Screening Tests and Risk Assessments:     Santino Bran is here for his Initial Wellness visit  Health Risk Assessment:   Patient rates overall health as very good  Patient feels that their physical health rating is slightly better  Patient is satisfied with their life  Eyesight was rated as same  Hearing was rated as same  Patient feels that their emotional and mental health rating is same  Patients states they are never, rarely angry  Patient states they are sometimes unusually tired/fatigued  Pain experienced in the last 7 days has been none  Patient states that he has experienced no weight loss or gain in last 6 months  Fall Risk Screening: In the past year, patient has experienced: no history of falling in past year      Home Safety:  Patient does not have trouble with stairs inside or outside of their home  Patient has working smoke alarms and has working carbon monoxide detector  Home safety hazards include: medications that cause fatigue  Nutrition:   Current diet is Diabetic, Low Carb and No Added Salt  Medications:   Patient is not currently taking any over-the-counter supplements  Patient is able to manage medications  Activities of Daily Living (ADLs)/Instrumental Activities of Daily Living (IADLs):   Walk and transfer into and out of bed and chair?: Yes  Dress and groom yourself?: Yes    Bathe or shower yourself?: Yes    Feed yourself?  Yes  Do your laundry/housekeeping?: Yes  Manage your money, pay your bills and track your expenses?: Yes  Make your own meals?: Yes    Do your own shopping?: Yes    Previous Hospitalizations:   Any hospitalizations or ED visits within the last 12 months?: No      Advance Care Planning:   Living will: No    Durable POA for healthcare: No    Advanced directive: No      Cognitive Screening:   Provider or family/friend/caregiver concerned regarding cognition?: No    PREVENTIVE SCREENINGS      Cardiovascular Screening:    General: Screening Current      Diabetes Screening:     General: Screening Not Indicated and History Diabetes      Colorectal Cancer Screening:     General: Screening Current      Abdominal Aortic Aneurysm (AAA) Screening:    Risk factors include: age between 73-67 yo        Lung Cancer Screening:     General: Screening Not Indicated      Hepatitis C Screening:    General: Screening Current    Screening, Brief Intervention, and Referral to Treatment (SBIRT)    Screening  Typical number of drinks in a day: 0  Typical number of drinks in a week: 0  Interpretation: Low risk drinking behavior  AUDIT-C Screenin) How often did you have a drink containing alcohol in the past year? monthly or less  2) How many drinks did you have on a typical day when you were drinking in the past year? 0  3) How often did you have 6 or more drinks on one occasion in the past year? never    AUDIT-C Score: 1  Interpretation: Score 0-3 (male): Negative screen for alcohol misuse    Single Item Drug Screening:  How often have you used an illegal drug (including marijuana) or a prescription medication for non-medical reasons in the past year? never    Single Item Drug Screen Score: 0  Interpretation: Negative screen for possible drug use disorder    No results found  Physical Exam:     /85 (BP Location: Right arm, Patient Position: Sitting, Cuff Size: Large)   Pulse 78   Temp 98 2 °F (36 8 °C) (Tympanic)   Ht 6' 5" (1 956 m)   Wt 113 kg (249 lb)   SpO2 98%   BMI 29 53 kg/m²     Physical Exam  Vitals and nursing note reviewed     Constitutional:       Appearance: Normal appearance  He is normal weight  HENT:      Head: Normocephalic  Right Ear: There is impacted cerumen  Left Ear: There is impacted cerumen  Nose: Nose normal       Mouth/Throat:      Pharynx: Oropharynx is clear  Eyes:      General: No scleral icterus  Right eye: No discharge  Left eye: No discharge  Conjunctiva/sclera: Conjunctivae normal    Cardiovascular:      Rate and Rhythm: Normal rate  Rhythm irregular  Pulses: Normal pulses  no weak pulses          Dorsalis pedis pulses are 2+ on the right side and 2+ on the left side  Posterior tibial pulses are 2+ on the right side and 2+ on the left side  Pulmonary:      Effort: Pulmonary effort is normal       Breath sounds: Normal breath sounds  Abdominal:      General: Abdomen is flat  Palpations: Abdomen is soft  Tenderness: There is no abdominal tenderness  There is no guarding or rebound  Musculoskeletal:      Comments: Very slight bilateral pitting edema   Feet:      Right foot:      Skin integrity: No ulcer, skin breakdown, erythema, warmth, callus or dry skin  Left foot:      Skin integrity: No ulcer, skin breakdown, erythema, warmth, callus or dry skin  Lymphadenopathy:      Cervical: No cervical adenopathy  Skin:     Capillary Refill: Capillary refill takes less than 2 seconds  Neurological:      Mental Status: He is alert and oriented to person, place, and time  Psychiatric:         Behavior: Behavior normal       Diabetic Foot Exam    Patient's shoes and socks removed  Right Foot/Ankle   Right Foot Inspection  Skin Exam: skin normal and skin intact  No dry skin, no warmth, no callus, no erythema, no maceration, no abnormal color, no pre-ulcer, no ulcer and no callus  Toe Exam: ROM and strength within normal limits  No tenderness    Sensory   Vibration: intact      Vascular  Capillary refills: < 3 seconds  The right DP pulse is 2+  The right PT pulse is 2+       Left Foot/Ankle  Left Foot Inspection  Skin Exam: skin normal and skin intact  No dry skin, no warmth, no erythema, no maceration, normal color, no pre-ulcer, no ulcer and no callus  Toe Exam: ROM and strength within normal limits  No tenderness  Sensory   Vibration: intact      Vascular  Capillary refills: < 3 seconds  The left DP pulse is 2+  The left PT pulse is 2+       Assign Risk Category  No deformity present  No loss of protective sensation  No weak pulses  Risk: 0      Tatiana Yañez MD

## 2023-01-19 NOTE — ASSESSMENT & PLAN NOTE
Pt has hx of ED  Pt notes sildenafil 100mg PNR not having satisfactory results  PLAN:  Education on proper use provided   Tadalafil 10mg PRN provided: Told to take one 3 hrs before sexual activity  If pt still doesn't get results take 2

## 2023-02-09 ENCOUNTER — OFFICE VISIT (OUTPATIENT)
Dept: PULMONOLOGY | Facility: CLINIC | Age: 68
End: 2023-02-09

## 2023-02-09 VITALS
TEMPERATURE: 97.8 F | OXYGEN SATURATION: 98 % | BODY MASS INDEX: 29.76 KG/M2 | WEIGHT: 252 LBS | DIASTOLIC BLOOD PRESSURE: 74 MMHG | HEIGHT: 77 IN | HEART RATE: 89 BPM | SYSTOLIC BLOOD PRESSURE: 120 MMHG

## 2023-02-09 DIAGNOSIS — I10 BENIGN ESSENTIAL HYPERTENSION: ICD-10-CM

## 2023-02-09 DIAGNOSIS — Z91.89 AT RISK FOR SLEEP APNEA: ICD-10-CM

## 2023-02-09 DIAGNOSIS — G47.33 OSA (OBSTRUCTIVE SLEEP APNEA): Primary | ICD-10-CM

## 2023-02-09 DIAGNOSIS — I48.0 PAROXYSMAL ATRIAL FIBRILLATION (HCC): ICD-10-CM

## 2023-02-09 PROBLEM — I48.91 ATRIAL FIBRILLATION (HCC): Status: ACTIVE | Noted: 2023-02-09

## 2023-02-09 NOTE — PROGRESS NOTES
Assessment/Plan:    YEHUDA (obstructive sleep apnea)  Mr Pratibha Moe had snoring and nocturnal choking  He was diagnosed with paroxysmal atrial fibrillation about a year back  He also had diabetes and hypertension and hypercholesterolemia  He is a   He denied any daytime sleepiness or tiredness  His [place] sleepiness score is 1 out of 24  On clinical examination he had oropharyngeal crowding  He needs a sleep study to further evaluation and this has been ordered  He had no features suggestive of narcolepsy  I had a long discussion with him and answered all his questions  I have advised him regarding driving use of alcohol and sedating medications  Paroxysmal atrial fibrillation (HCC)  He has history of paroxysmal atrial fibrillation and currently is on treatment with metoprolol  He is on systemic anticoagulation with Eliquis  Essential hypertension  Had history of hypertension and currently this is controlled with metoprolol and lisinopril       Diagnoses and all orders for this visit:    YEHUDA (obstructive sleep apnea)  -     Diagnostic Sleep Study; Future    Paroxysmal atrial fibrillation (HCC)    Benign essential hypertension    At risk for sleep apnea  -     Ambulatory referral to Sleep Medicine          Subjective:      Patient ID: Lul Iverson is a 79 y o  male  Mr Pratibha Moe has been referred for evaluation for sleep breathing disorder  He was diagnosed with paroxysmal atrial fibrillation about a year back  He has past medical history of diabetes and hypertension and hypercholesterolemia  He has been on treatment with metoprolol and Eliquis  He is a   He lives alone  He denied any daytime sleepiness or tiredness  His [place] sleepiness score was 1 out of 24  He does not wake up during sleep gasping for air  He denied any morning headache  He has not been sleep tested before  He denied any shortness of breath cough or phlegm or wheeze or chest pain  He denied any swelling of feet or palpitations  Denied any problems while driving  He has no history suggestive of narcolepsy  The following portions of the patient's history were reviewed and updated as appropriate: allergies, current medications, past family history, past medical history, past social history, past surgical history and problem list     Review of Systems   Constitutional: Negative for activity change, appetite change, chills, fatigue, fever and unexpected weight change  HENT: Negative for hearing loss, rhinorrhea, sneezing, sore throat, trouble swallowing and voice change  Eyes: Negative for visual disturbance  Respiratory: Negative for cough, shortness of breath and wheezing  Cardiovascular: Negative for chest pain, palpitations and leg swelling  Gastrointestinal: Negative for abdominal pain, constipation, diarrhea, nausea and vomiting  Genitourinary: Negative for dysuria, frequency and urgency  Musculoskeletal: Negative for arthralgias, back pain and joint swelling  Skin: Negative for rash  Allergic/Immunologic: Positive for environmental allergies  Neurological: Negative for dizziness, seizures, syncope, light-headedness and headaches  Psychiatric/Behavioral: Negative for agitation, confusion and sleep disturbance  The patient is not nervous/anxious  Objective:      /74   Pulse 89   Temp 97 8 °F (36 6 °C)   Ht 6' 5" (1 956 m)   Wt 114 kg (252 lb)   SpO2 98%   BMI 29 88 kg/m²          Physical Exam  Vitals reviewed  Constitutional:       General: He is not in acute distress  Appearance: He is not ill-appearing, toxic-appearing or diaphoretic  HENT:      Head: Normocephalic  Nose: Nose normal       Mouth/Throat:      Mouth: Mucous membranes are moist       Pharynx: Oropharynx is clear  Comments: Mallampatti Class3    Eyes:      General: No scleral icterus       Conjunctiva/sclera: Conjunctivae normal    Cardiovascular:      Rate and Rhythm: Normal rate and regular rhythm  Heart sounds: Normal heart sounds  No murmur heard  Pulmonary:      Effort: Pulmonary effort is normal  No respiratory distress  Breath sounds: Normal breath sounds  No stridor  No wheezing, rhonchi or rales  Chest:      Chest wall: No tenderness  Abdominal:      General: Bowel sounds are normal  There is no distension  Palpations: Abdomen is soft  Tenderness: There is no abdominal tenderness  There is no guarding  Musculoskeletal:      Cervical back: Neck supple  No rigidity  Right lower leg: No edema  Left lower leg: No edema  Lymphadenopathy:      Cervical: No cervical adenopathy  Skin:     Coloration: Skin is not jaundiced or pale  Findings: No rash  Neurological:      Mental Status: He is alert and oriented to person, place, and time  Gait: Gait normal    Psychiatric:         Mood and Affect: Mood normal          Behavior: Behavior normal          Thought Content:  Thought content normal          Judgment: Judgment normal

## 2023-02-13 NOTE — ASSESSMENT & PLAN NOTE
Mr  Faudmo Grandchild had snoring and nocturnal choking  He was diagnosed with paroxysmal atrial fibrillation about a year back  He also had diabetes and hypertension and hypercholesterolemia  He is a   He denied any daytime sleepiness or tiredness  His [place] sleepiness score is 1 out of 24  On clinical examination he had oropharyngeal crowding  He needs a sleep study to further evaluation and this has been ordered  He had no features suggestive of narcolepsy  I had a long discussion with him and answered all his questions  I have advised him regarding driving use of alcohol and sedating medications

## 2023-02-13 NOTE — ASSESSMENT & PLAN NOTE
He has history of paroxysmal atrial fibrillation and currently is on treatment with metoprolol  He is on systemic anticoagulation with Eliquis

## 2023-03-10 LAB
CREAT ?TM UR-SCNC: 100.2 UMOL/L
EXT ALBUMIN URINE RANDOM: 1
HBA1C MFR BLD HPLC: 6.2 %
MICROALBUMIN/CREAT UR: 10 MG/G{CREAT}

## 2023-03-20 PROBLEM — H61.23 IMPACTED CERUMEN, BILATERAL: Status: RESOLVED | Noted: 2023-01-19 | Resolved: 2023-03-20

## 2023-03-20 PROBLEM — Z00.00 MEDICARE ANNUAL WELLNESS VISIT, INITIAL: Status: RESOLVED | Noted: 2023-01-19 | Resolved: 2023-03-20

## 2023-03-23 DIAGNOSIS — E66.3 OVERWEIGHT (BMI 25.0-29.9): Primary | ICD-10-CM

## 2023-03-23 DIAGNOSIS — I48.19 PERSISTENT ATRIAL FIBRILLATION (HCC): Primary | ICD-10-CM

## 2023-03-24 ENCOUNTER — APPOINTMENT (OUTPATIENT)
Dept: LAB | Facility: CLINIC | Age: 68
End: 2023-03-24

## 2023-03-24 DIAGNOSIS — I48.19 PERSISTENT ATRIAL FIBRILLATION (HCC): ICD-10-CM

## 2023-03-24 LAB — TSH SERPL DL<=0.05 MIU/L-ACNC: 0.53 UIU/ML (ref 0.45–4.5)

## 2023-03-31 ENCOUNTER — OFFICE VISIT (OUTPATIENT)
Dept: INTERNAL MEDICINE CLINIC | Facility: CLINIC | Age: 68
End: 2023-03-31

## 2023-03-31 VITALS
BODY MASS INDEX: 30.39 KG/M2 | HEIGHT: 77 IN | DIASTOLIC BLOOD PRESSURE: 75 MMHG | HEART RATE: 84 BPM | WEIGHT: 257.4 LBS | TEMPERATURE: 96.6 F | SYSTOLIC BLOOD PRESSURE: 110 MMHG | OXYGEN SATURATION: 98 %

## 2023-03-31 DIAGNOSIS — G47.33 OSA (OBSTRUCTIVE SLEEP APNEA): ICD-10-CM

## 2023-03-31 DIAGNOSIS — M25.511 CHRONIC RIGHT SHOULDER PAIN: ICD-10-CM

## 2023-03-31 DIAGNOSIS — G89.29 CHRONIC RIGHT SHOULDER PAIN: ICD-10-CM

## 2023-03-31 DIAGNOSIS — I10 ESSENTIAL HYPERTENSION: ICD-10-CM

## 2023-03-31 DIAGNOSIS — I83.91 ASYMPTOMATIC VARICOSE VEINS OF RIGHT LOWER EXTREMITY: ICD-10-CM

## 2023-03-31 DIAGNOSIS — N52.9 ERECTILE DYSFUNCTION, UNSPECIFIED ERECTILE DYSFUNCTION TYPE: ICD-10-CM

## 2023-03-31 DIAGNOSIS — E11.9 TYPE 2 DIABETES MELLITUS WITHOUT COMPLICATION, WITHOUT LONG-TERM CURRENT USE OF INSULIN (HCC): Primary | ICD-10-CM

## 2023-03-31 DIAGNOSIS — I48.19 PERSISTENT ATRIAL FIBRILLATION (HCC): ICD-10-CM

## 2023-03-31 DIAGNOSIS — E78.2 MIXED DYSLIPIDEMIA: ICD-10-CM

## 2023-03-31 RX ORDER — CAPSAICIN 0.025 %
1 CREAM (GRAM) TOPICAL 2 TIMES DAILY
Qty: 60 G | Refills: 0 | Status: SHIPPED | OUTPATIENT
Start: 2023-03-31

## 2023-03-31 RX ORDER — FENOFIBRATE 48 MG/1
48 TABLET, COATED ORAL DAILY
Qty: 90 TABLET | Refills: 3 | Status: CANCELLED | OUTPATIENT
Start: 2023-03-31

## 2023-03-31 RX ORDER — METHOCARBAMOL 750 MG/1
750 TABLET, FILM COATED ORAL EVERY 6 HOURS PRN
Qty: 56 TABLET | Refills: 0 | Status: CANCELLED | OUTPATIENT
Start: 2023-03-31 | End: 2023-04-14

## 2023-03-31 RX ORDER — TADALAFIL 10 MG/1
10 TABLET ORAL DAILY PRN
COMMUNITY

## 2023-03-31 NOTE — PROGRESS NOTES
Name: Tala Bazzi      : 1955      MRN: [id number]  Encounter Provider: Aimee Cox MD  Encounter Date: 3/31/2023   Encounter department: [address] [place] [address]  Type 2 diabetes mellitus without complication, without long-term current use of insulin ([facility])  Assessment & Plan:    Lab Results   Component Value Date    HGBA1C 6 2 03/10/2023   Current sxs: None  Glucose checks every other day average 130   Pt takes 500 mg PO BID  PLAN:  Continue current treatment  Continue lifestyle modifications       2  Essential hypertension  Assessment & Plan:  Hx of HTN  Currently the readings have been acceptable     PLAN:  Continue current txt plan  Lifestyle changes       3  Persistent atrial fibrillation (HCC)  Assessment & Plan:  Current [id number] VASc 3  Pt follows w/ cardiology    PLAN:  Continue per cardiology recommendations       4  Mixed dyslipidemia  Assessment & Plan:  Recent lipids from [facility]   All values WNL except triglycerides around 190      PLAN:  Repeat lipid panel 3 months      Orders:  -     Lipid panel; Future    5  Erectile dysfunction, unspecified erectile dysfunction type  Assessment & Plan:  Current not in a relationship    PLAN:  Pt has cialis when needed         6  YEHUDA (obstructive sleep apnea)  Assessment & Plan:  [id number] pt has appointment for YEHUDA        7  Chronic right shoulder pain  Assessment & Plan:  Pt notes 3 months hx of intermittent right shoulder pain  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  Pain is pronounced on extension and abduction  Pt rates pain as 5/10  Pt Hany any numbness nor radiation  Pt notes feels better when relaxing  PLAN:  Ibuprofen  Capsasin cream     Orders:  -     capsaicin (ZOSTRIX) 0 025 % cream; Apply 1 application  topically 2 (two) times a day    8  Asymptomatic varicose veins of right lower extremity  Assessment & Plan:  Pt notes veins in right leg     No sxs, just "astatic  Pt previously had veins removed on left    PLAN:  Compression stockings  Orders:  -     CompreSleeve (below knee) 30-40 mmHg         Subjective      Pt presented to clinic for follow up  Pt notes right shoulder pain  Onset was 3 months ago and is intermittent  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  Pain is pronounced on extension and abduction  Pt rates pain as 5/10  Pt Hany any numbness  Pt notes feels better when relaxing  Pt also wants information on which compression socks to order  Review of Systems   Constitutional: Negative for unexpected weight change  HENT: Negative  Eyes: Negative  Respiratory: Negative  Cardiovascular: Negative  Gastrointestinal: Negative  Genitourinary: Negative  Musculoskeletal: Positive for neck pain and neck stiffness  Skin: Negative  Neurological: Negative  Current Outpatient Medications on File Prior to Visit   Medication Sig   • apixaban (Eliquis) 5 mg Take 1 tablet (5 mg total) by mouth 2 (two) times a day   • atorvastatin (LIPITOR) 20 mg tablet Take 20 mg by mouth daily   • lisinopril (ZESTRIL) 5 mg tablet TAKE ONE TABLET BY MOUTH EVERY DAY FOR BLOOD PRESSURE/HEART   • metFORMIN (GLUCOPHAGE) 500 mg tablet Take 1,000 mg by mouth   • metoprolol tartrate (LOPRESSOR) 25 mg tablet Take 0 5 tablets by mouth 2 (two) times a day   • Multiple Vitamins-Minerals (CENTRUM ADULTS PO) Take 50 mg by mouth   • omeprazole (PriLOSEC) 20 mg delayed release capsule    • tadalafil (CIALIS) 10 MG tablet Take 10 mg by mouth daily as needed for erectile dysfunction       Objective     /75 (BP Location: Left arm, Patient Position: Sitting, Cuff Size: Large)   Pulse 84   Temp (!) 96 6 °F (35 9 °C) (Tympanic)   Ht 6' 5\" (1 956 m)   Wt 117 kg (257 lb 6 4 oz)   SpO2 98%   BMI 30 52 kg/m²     Physical Exam  Vitals and nursing note reviewed  Constitutional:       General: He is not in acute distress       Appearance: Normal " appearance  He is obese  He is not ill-appearing, toxic-appearing or diaphoretic  HENT:      Head: Normocephalic and atraumatic  Right Ear: There is impacted cerumen  Left Ear: There is impacted cerumen  Nose: Nose normal  No congestion  Mouth/Throat:      Mouth: Mucous membranes are moist       Pharynx: Oropharynx is clear  No oropharyngeal exudate or posterior oropharyngeal erythema  Eyes:      General: No scleral icterus  Right eye: No discharge  Left eye: No discharge  Conjunctiva/sclera: Conjunctivae normal    Cardiovascular:      Rate and Rhythm: Normal rate  Rhythm irregular  Pulses: Normal pulses  Pulmonary:      Effort: Pulmonary effort is normal  No respiratory distress  Breath sounds: Normal breath sounds  No stridor  No wheezing, rhonchi or rales  Abdominal:      Tenderness: There is no abdominal tenderness  There is no guarding or rebound  Musculoskeletal:         General: Normal range of motion  Skin:     General: Skin is warm  Capillary Refill: Capillary refill takes less than 2 seconds  Coloration: Skin is not jaundiced  Neurological:      Mental Status: He is alert and oriented to person, place, and time  Nutrition Assessment and Intervention:     Recommended completion of food recall journal    Ordered nutritional assessment labs    Online resources such as NutritionFacts  [place]  com, plantstrong com, pcrm  Applicasa, or similar provided to patient      Physical Activity Assessment and Intervention:    Activity journal completion recommended    Physical activity online resources/apps provided to patient      Emotional and Mental Well-being, Sleep, Connectedness Assessment and Intervention:    Sleep/stress assessment performed    Depression and anxiety screening performed and reviewed    Counseled regarding sleep hygiene and aspects of healthy sleep    Mindfulness program recommended/explained    Stress management plan created with patient    Stress management, meditation, yoga, or sleep online resources/apps provided to patient      Tobacco and Toxic Substance Assessment and Intervention:     Tobacco use screening performed    Alcohol and drug use screening performed    Brief intervention performed for tobacco, alcohol, or drug use      Natalie Littlejohn MD

## 2023-03-31 NOTE — ASSESSMENT & PLAN NOTE
Pt notes veins in right leg  No sxs, just astatic  Pt previously had veins removed on left    PLAN:  Compression stockings

## 2023-03-31 NOTE — ASSESSMENT & PLAN NOTE
Hx of HTN  Currently the readings have been acceptable     PLAN:  Continue current txt plan     Lifestyle changes

## 2023-03-31 NOTE — ASSESSMENT & PLAN NOTE
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 They should call the office if they experience any of these side effects.

## 2023-03-31 NOTE — ASSESSMENT & PLAN NOTE
Lab Results   Component Value Date    HGBA1C 6 2 03/10/2023   Current sxs: None  Glucose checks every other day average 130   Pt takes 500 mg PO BID         PLAN:  Continue current treatment  Continue lifestyle modifications

## 2023-03-31 NOTE — ASSESSMENT & PLAN NOTE
Recent lipids from [place] Mar23  All values WNL except triglycerides around 190      PLAN:  Repeat lipid panel 3 months

## 2023-04-27 ENCOUNTER — HOSPITAL ENCOUNTER (OUTPATIENT)
Dept: SLEEP CENTER | Facility: CLINIC | Age: 68
Discharge: HOME | End: 2023-04-27

## 2023-04-27 DIAGNOSIS — G47.33 OSA (OBSTRUCTIVE SLEEP APNEA): ICD-10-CM

## 2023-04-28 NOTE — PROGRESS NOTES
Sleep Study Documentation    Pre-Sleep Study       Sleep testing procedure explained to patient:YES    Patient napped prior to study:NO    Caffeine:Dayshift worker after 12PM   Caffeine use:NO    Alcohol:Dayshift workers after 5PM: Alcohol use:NO    Typical day for patient:YES       Study Documentation    Sleep Study Indications: Nocturnal choking, impaired concentration/memory, unrefreshed sleep    Sleep Study: Diagnostic   Snore:Mild  Supplemental O2: no    O2 flow rate (L/min) range NA  O2 flow rate (L/min) final NA  Minimum SaO2 86  Baseline SaO2 96        Mode of Therapy: NA    EKG abnormalities: yes:  EPOCH example and comments:     EEG abnormalities: no    Sleep Study Recorded < 2 hours: N/A    Sleep Study Recorded > 2 hours but incomplete study: N/A    Sleep Study Recorded 6 hours but no sleep obtained: NO    Patient classification: retired       Post-Sleep Study    Medication used at bedtime or during sleep study:YES other prescription medications    Patient reports time it took to fall asleep:20 to 30 minutes    Patient reports waking up during study:1 to 2 times  Patient reports returning to sleep in 10 to 30 minutes  Patient reports sleeping 4 to 6 hours without dreaming  Patient reports sleep during study:typical    Patient rated sleepiness: Not sleepy or tired    PAP treatment:no

## 2023-05-02 ENCOUNTER — OFFICE VISIT (OUTPATIENT)
Dept: PULMONOLOGY | Facility: CLINIC | Age: 68
End: 2023-05-02

## 2023-05-02 VITALS
WEIGHT: 255 LBS | OXYGEN SATURATION: 98 % | HEIGHT: 77 IN | HEART RATE: 75 BPM | BODY MASS INDEX: 30.11 KG/M2 | TEMPERATURE: 97.3 F | DIASTOLIC BLOOD PRESSURE: 78 MMHG | RESPIRATION RATE: 18 BRPM | SYSTOLIC BLOOD PRESSURE: 122 MMHG

## 2023-05-02 DIAGNOSIS — I48.19 PERSISTENT ATRIAL FIBRILLATION (HCC): ICD-10-CM

## 2023-05-02 DIAGNOSIS — G47.33 OSA (OBSTRUCTIVE SLEEP APNEA): Primary | ICD-10-CM

## 2023-05-02 DIAGNOSIS — I10 ESSENTIAL HYPERTENSION: ICD-10-CM

## 2023-05-02 NOTE — PROGRESS NOTES
Assessment/Plan:    YEHUDA (obstructive sleep apnea)  Mr Jessica Vo had snoring and nocturnal choking  He was diagnosed with paroxysmal atrial fibrillation about a year back  He also had diabetes and hypertension and hypercholesterolemia  He is a   He denied any daytime sleepiness or tiredness  His [place] sleepiness score is 1 out of 24  On clinical examination he had oropharyngeal crowding  Overnight sleep study showed mild to moderate obstructive sleep apnea oxygen desaturation  His overall AHI was in the mild range, his REM AHI was in the severe range  He would benefit from CPAP therapy I have ordered auto CPAP therapy  The have advised him to inform his employer regarding the diagnosis of obstructive sleep apnea  I highlighted to him the need for using the CPAP regularly and adequately  I also advised him regarding driving use of alcohol and sedating medications  Persistent atrial fibrillation ([address] )  He has history of  atrial fibrillation and currently is on treatment with metoprolol  He is on systemic anticoagulation with Eliquis  He follows with Dr Clark Pike hypertension  History of hypertension and has been on treatment with lisinopril and metoprolol  Diagnoses and all orders for this visit:    YEHUDA (obstructive sleep apnea)  -     CPAP Auto New DME    Essential hypertension    Persistent atrial fibrillation (HCC)          Subjective:      Patient ID: Nagi Abbasi is a 79 y o  male  Mr Jessica Vo came for follow-up for his obstructive sleep apnea diagnosed on an overnight sleep study recently  He has snoring and interrupted sleep  He denied any daytime sleepiness or tiredness  He is a   His overnight sleep study showed mild to moderate obstructive sleep apnea with oxygen desaturation  He has persistent atrial fibrillation and is on treatment with metoprolol and systemic anticoagulation with apixaban    He denied any significant cough or "phlegm or wheeze or chest pain  He has occasional shortness of breath  He denied any swelling of feet  He denied any dizziness or lightheadedness  We will give him an auto CPAP trial   I have advised him to let his employer know that he has been diagnosed with obstructive sleep apnea and is going to be started on CPAP  The following portions of the patient's history were reviewed and updated as appropriate: allergies, current medications, past family history, past medical history, past social history, past surgical history and problem list     Review of Systems   Constitutional: Negative for appetite change, chills, fatigue and fever  HENT: Negative for hearing loss, rhinorrhea, sneezing, sore throat, trouble swallowing and voice change  Eyes: Negative for visual disturbance  Respiratory: Positive for shortness of breath  Negative for cough and wheezing  Gastrointestinal: Negative for abdominal pain, constipation, diarrhea, nausea and vomiting  Genitourinary: Negative for dysuria, frequency and urgency  Musculoskeletal: Negative for arthralgias and gait problem  Skin: Negative for rash  Allergic/Immunologic: Positive for environmental allergies  Neurological: Negative for dizziness, syncope, light-headedness and headaches  Psychiatric/Behavioral: Positive for sleep disturbance  Negative for agitation and confusion  The patient is not nervous/anxious  Objective:      /78 (BP Location: Left arm, Patient Position: Sitting, Cuff Size: Standard)   Pulse 75   Temp (!) 97 3 °F (36 3 °C) (Tympanic)   Resp 18   Ht 6' 5\" (1 956 m)   Wt 116 kg (255 lb)   SpO2 98%   BMI 30 24 kg/m²          Physical Exam  Vitals reviewed  Constitutional:       General: He is not in acute distress  Appearance: He is obese  He is not ill-appearing, toxic-appearing or diaphoretic  HENT:      Head: Normocephalic        Mouth/Throat:      Mouth: Mucous membranes are moist    Eyes:      " General: No scleral icterus  Conjunctiva/sclera: Conjunctivae normal    Cardiovascular:      Rate and Rhythm: Normal rate  Rhythm irregular  Heart sounds: Normal heart sounds  No murmur heard  Pulmonary:      Effort: Pulmonary effort is normal  No respiratory distress  Breath sounds: No stridor  No wheezing, rhonchi or rales  Chest:      Chest wall: No tenderness  Abdominal:      General: Bowel sounds are normal       Palpations: Abdomen is soft  Tenderness: There is no abdominal tenderness  There is no guarding  Musculoskeletal:      Cervical back: No rigidity  Right lower leg: No edema  Left lower leg: No edema  Lymphadenopathy:      Cervical: No cervical adenopathy  Skin:     Coloration: Skin is not jaundiced or pale  Findings: No rash  Neurological:      Mental Status: He is alert and oriented to person, place, and time  Gait: Gait normal    Psychiatric:         Mood and Affect: Mood normal          Behavior: Behavior normal          Thought Content:  Thought content normal          Judgment: Judgment normal

## 2023-05-02 NOTE — ASSESSMENT & PLAN NOTE
Mr Lobo Harvey had snoring and nocturnal choking  He was diagnosed with paroxysmal atrial fibrillation about a year back  He also had diabetes and hypertension and hypercholesterolemia  He is a   He denied any daytime sleepiness or tiredness  His [place] sleepiness score is 1 out of 24  On clinical examination he had oropharyngeal crowding  Overnight sleep study showed mild to moderate obstructive sleep apnea oxygen desaturation  His overall AHI was in the mild range, his REM AHI was in the severe range  He would benefit from CPAP therapy I have ordered auto CPAP therapy  The have advised him to inform his employer regarding the diagnosis of obstructive sleep apnea  I highlighted to him the need for using the CPAP regularly and adequately  I also advised him regarding driving use of alcohol and sedating medications

## 2023-05-03 ENCOUNTER — TELEPHONE (OUTPATIENT)
Dept: INTERNAL MEDICINE CLINIC | Facility: CLINIC | Age: 68
End: 2023-05-03

## 2023-05-03 DIAGNOSIS — M79.601 PAIN OF RIGHT UPPER EXTREMITY: Primary | ICD-10-CM

## 2023-05-03 NOTE — ASSESSMENT & PLAN NOTE
He has history of  atrial fibrillation and currently is on treatment with metoprolol  He is on systemic anticoagulation with Eliquis   He follows with Dr Jose Aguirre

## 2023-05-03 NOTE — TELEPHONE ENCOUNTER
Patient is still having right shoulder pain is asking for xray and also elbow xray  wants that reordered

## 2023-05-04 LAB

## 2023-05-05 ENCOUNTER — HOSPITAL ENCOUNTER (OUTPATIENT)
Dept: CT IMAGING | Facility: HOSPITAL | Age: 68
Discharge: HOME | End: 2023-05-05
Attending: INTERNAL MEDICINE

## 2023-05-05 ENCOUNTER — HOSPITAL ENCOUNTER (OUTPATIENT)
Dept: RADIOLOGY | Facility: HOSPITAL | Age: 68
Discharge: HOME | End: 2023-05-05

## 2023-05-05 DIAGNOSIS — I77.89 ASCENDING AORTA ENLARGEMENT (HCC): ICD-10-CM

## 2023-05-05 DIAGNOSIS — M79.601 PAIN OF RIGHT UPPER EXTREMITY: ICD-10-CM

## 2023-05-09 NOTE — RESULT ENCOUNTER NOTE
Chest CT reviewed:  Ascending aorta size is stable at 42 mm  These results are reassuring  Please call patient with test results

## 2023-05-10 LAB

## 2023-05-16 ENCOUNTER — TELEPHONE (OUTPATIENT)
Dept: INTERNAL MEDICINE CLINIC | Facility: CLINIC | Age: 68
End: 2023-05-16

## 2023-05-16 NOTE — TELEPHONE ENCOUNTER
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

## 2023-05-31 LAB

## 2023-07-13 ENCOUNTER — OFFICE VISIT (OUTPATIENT)
Dept: PULMONOLOGY | Facility: CLINIC | Age: 68
End: 2023-07-13
Payer: MEDICARE

## 2023-07-13 VITALS
HEART RATE: 76 BPM | OXYGEN SATURATION: 98 % | DIASTOLIC BLOOD PRESSURE: 70 MMHG | SYSTOLIC BLOOD PRESSURE: 100 MMHG | BODY MASS INDEX: 29.76 KG/M2 | WEIGHT: 252 LBS | HEIGHT: 77 IN | TEMPERATURE: 98 F

## 2023-07-13 DIAGNOSIS — G47.33 OSA (OBSTRUCTIVE SLEEP APNEA): Primary | ICD-10-CM

## 2023-07-13 DIAGNOSIS — I10 ESSENTIAL HYPERTENSION: ICD-10-CM

## 2023-07-13 DIAGNOSIS — I48.19 PERSISTENT ATRIAL FIBRILLATION (HCC): ICD-10-CM

## 2023-07-13 PROCEDURE — 99214 OFFICE O/P EST MOD 30 MIN: CPT | Performed by: INTERNAL MEDICINE

## 2023-07-13 NOTE — ASSESSMENT & PLAN NOTE
Mr. Sofy Don had snoring and nocturnal choking. [facility] was diagnosed with paroxysmal atrial fibrillation about a year back.  He also had diabetes and hypertension and hypercholesterolemia. [facility] is a . [facility] denied any daytime sleepiness or tiredness.  His [place] sleepiness score is 1 out of 24.  On clinical examination he had oropharyngeal crowding. Overnight sleep study showed mild to moderate obstructive sleep apnea oxygen desaturation. His overall AHI was in the mild range, his REM AHI was in the severe range. Currently he has been started on CPAP therapy and is getting clinical benefit from therapy. He has occasional dry mouth. His compliance is excellent and his residual AHI low. He has occasional leak. I have ordered a mask fit. I had a long discussion with him and answered all his questions.   I advised him to continue CPAP therapy.

## 2023-07-13 NOTE — PROGRESS NOTES
Assessment/Plan:    YEHUDA (obstructive sleep apnea)  Mr. Magnolia Phan had snoring and nocturnal choking. [facility] was diagnosed with paroxysmal atrial fibrillation about a year back.  He also had diabetes and hypertension and hypercholesterolemia. [facility] is a . [facility] denied any daytime sleepiness or tiredness.  His [place] sleepiness score is 1 out of 24.  On clinical examination he had oropharyngeal crowding. Overnight sleep study showed mild to moderate obstructive sleep apnea oxygen desaturation. His overall AHI was in the mild range, his REM AHI was in the severe range. Currently he has been started on CPAP therapy and is getting clinical benefit from therapy. He has occasional dry mouth. His compliance is excellent and his residual AHI low. He has occasional leak. I have ordered a mask fit. I had a long discussion with him and answered all his questions. I advised him to continue CPAP therapy.     Persistent atrial fibrillation ([address])  He has history of  atrial fibrillation and currently is on treatment with metoprolol.  He is on systemic anticoagulation with Eliquis. He follows with Dr. Mary Saleh hypertension  History of hypertension and has been on treatment with lisinopril and metoprolol.          Diagnoses and all orders for this visit:    YEHUDA (obstructive sleep apnea)  -     Mask fitting only; Future    Persistent atrial fibrillation (HCC)    Essential hypertension          Subjective:      Patient ID: Luis Elder is a 79 y.o. male. Mr. Magnolia Phan came for follow-up for his obstructive sleep apnea on CPAP therapy. Currently he is on auto CPAP therapy and is getting clinical benefit from therapy. He has occasional dry mouth. His CPAP compliance is excellent and his residual AHI low. He has occasional leak. We will send him for a mask fit. He has no significant daytime sleepiness or morning headache. He also has atrial fibrillation and has been on treatment.   His blood pressure is controlled. The following portions of the patient's history were reviewed and updated as appropriate: allergies, current medications, past family history, past medical history, past social history, past surgical history and problem list.    Review of Systems   Constitutional: Negative for appetite change, chills, fatigue and fever. HENT: Negative for hearing loss, rhinorrhea, sneezing, sore throat and trouble swallowing. Eyes: Negative for visual disturbance. Respiratory: Negative for cough, shortness of breath and wheezing. Cardiovascular: Negative for chest pain, palpitations and leg swelling. Gastrointestinal: Negative for abdominal pain, constipation, diarrhea, nausea and vomiting. Genitourinary: Negative for dysuria, frequency and urgency. Musculoskeletal: Negative for arthralgias and gait problem. Skin: Negative for rash. Neurological: Negative for dizziness, syncope, light-headedness and headaches. Psychiatric/Behavioral: Negative for agitation, confusion and sleep disturbance. The patient is not nervous/anxious. Objective:      /70   Pulse 76   Temp 98 °F (36.7 °C)   Ht 6' 5" (1.956 m)   Wt 114 kg (252 lb)   SpO2 98%   BMI 29.88 kg/m²          Physical Exam  Constitutional:       General: He is not in acute distress. Appearance: He is not ill-appearing, toxic-appearing or diaphoretic. HENT:      Head: Normocephalic. Mouth/Throat:      Mouth: Mucous membranes are moist.   Eyes:      Conjunctiva/sclera: Conjunctivae normal.   Cardiovascular:      Rate and Rhythm: Normal rate. Heart sounds: Normal heart sounds. No murmur heard. Pulmonary:      Effort: No respiratory distress. Breath sounds: Normal breath sounds. No stridor. No wheezing, rhonchi or rales. Abdominal:      General: Bowel sounds are normal.      Palpations: Abdomen is soft. Tenderness: There is no abdominal tenderness. There is no guarding.    Musculoskeletal: Cervical back: No rigidity. Right lower leg: No edema. Left lower leg: No edema. Lymphadenopathy:      Cervical: No cervical adenopathy. Skin:     Coloration: Skin is not jaundiced or pale. Findings: No rash. Neurological:      Mental Status: He is alert and oriented to person, place, and time. Gait: Gait normal.   Psychiatric:         Mood and Affect: Mood normal.         Behavior: Behavior normal.         Thought Content: Thought content normal.         Judgment: Judgment normal.       I spent 30 minutes of time taking care of this patient with complex medical issues. The majority of this time was spent directly with the patient counseling as well as correlating care.

## 2023-07-16 NOTE — ASSESSMENT & PLAN NOTE
He has history of  atrial fibrillation and currently is on treatment with metoprolol.  He is on systemic anticoagulation with Eliquis.  He follows with Dr. Raina Patterson

## 2023-07-18 ENCOUNTER — TELEPHONE (OUTPATIENT)
Dept: OTHER | Facility: OTHER | Age: 68
End: 2023-07-18

## 2023-08-18 ENCOUNTER — APPOINTMENT (OUTPATIENT)
Dept: URGENT CARE | Age: 68
End: 2023-08-18

## 2023-09-08 ENCOUNTER — OFFICE VISIT (OUTPATIENT)
Dept: CARDIOLOGY CLINIC | Facility: CLINIC | Age: 68
End: 2023-09-08
Payer: MEDICARE

## 2023-09-08 VITALS
HEIGHT: 77 IN | SYSTOLIC BLOOD PRESSURE: 122 MMHG | HEART RATE: 70 BPM | WEIGHT: 250 LBS | DIASTOLIC BLOOD PRESSURE: 86 MMHG | BODY MASS INDEX: 29.52 KG/M2

## 2023-09-08 DIAGNOSIS — E11.9 TYPE 2 DIABETES MELLITUS WITHOUT COMPLICATION, WITHOUT LONG-TERM CURRENT USE OF INSULIN (HCC): ICD-10-CM

## 2023-09-08 DIAGNOSIS — I48.19 PERSISTENT ATRIAL FIBRILLATION (HCC): Primary | ICD-10-CM

## 2023-09-08 DIAGNOSIS — E78.2 MIXED DYSLIPIDEMIA: ICD-10-CM

## 2023-09-08 DIAGNOSIS — I10 ESSENTIAL HYPERTENSION: ICD-10-CM

## 2023-09-08 DIAGNOSIS — I77.89 ASCENDING AORTA ENLARGEMENT (HCC): ICD-10-CM

## 2023-09-08 PROCEDURE — 99214 OFFICE O/P EST MOD 30 MIN: CPT | Performed by: INTERNAL MEDICINE

## 2023-09-08 PROCEDURE — 93000 ELECTROCARDIOGRAM COMPLETE: CPT | Performed by: INTERNAL MEDICINE

## 2023-09-08 NOTE — PROGRESS NOTES
[facility]  [address]  [address]  [place] [address]  Phone#  [phone]  Fax#  [phone]  [facility]'s Cardiology Office Follow-up Visit             NAME: Ray Carlson  AGE: 79 y.o. SEX: male   : 1955   MRN: [id number]    DATE: 2023  TIME: 9:23 AM    Cardiology Problem list:  Persistent atrial fibrillation:  Eliquis and metoprolol  3/22:  Echo:  EF 60, mild LVH, LA enlargement, mild MR  : Echo: EF 59, small apical infarct  :  Calcium score 15. Aortic enlargement: :  CT  Ascending aorta 44 mm. BSA 2.5. Indexed aortic root size 1.7, possibly normal  : Ascending aorta 42 mm  Diabetes with dyslipidemia:  Managed at the [place]  Hypertension  Sleep apnea: CPAP started by Dr. Luiza Dc and plan:    Paroxysmal atrial fibrillation:  Managed by rate control and anticoagulation. EKF: AF VR 71  Previously we had discussed the options of ablation, cardioversion, and ongoing medical therapy with beta-blockers and anticoagulation.  He is not keen on EP evaluation. LV function preserved on echo. Was fairly asymptomatic from the atrial fibrillation. On apixaban and metoprolol.     Hypertension:  BP Readings from Last 3 Encounters:   23 100/70   23 122/78   23 110/75   Continue current medications. Lifestyle modification. Close blood pressure monitoring.     Mixed dyslipidemia  Lipids are managed by primary care-Labs done every 6 months at the [facility].    Tolerating atorvastatin.     Ascending aorta enlargement   Ascending aorta previously measured as 44 mm on CT chest.  Indexed aortic size probably in the normal range given his body habitus.  Repeat CTA in  showed ascending aorta was 42 mm.           Chief Complaint   Patient presents with   • Atrial Fibrillation     F/u visit   No cardiac complaints        HPI:    Ray Carlson is a 79y.o.-year-old male who presents to the cardiology clinic for follow up for the above-listed problems. He has history of atrial fibrillation with controlled ventricular rate. Previous calcium score was only 15. CT chest also showed ascending aortic enlargement at 44 mm. His  indexed aortic size is 17mm/sq. m. which probably is in the normal range. Follow-up CT in 5/23 showed that the ascending aorta measured 42 mm. This was reassuring. Lipids are managed by the [facility]. No interim cardiac hospitalizations. No lab work available in epic. Patient is doing well from a cardiovascular standpoint. No recent cardiac hospitalizations. Current medications reviewed. Reports compliance to medicines. No side effects reported. Denies chest pain on exertion. Denies worsening shortness of breath. Denies sustained palpitations. Past history, family history, social history, current medications, vital signs, recent lab and imaging studies and  prior cardiology studies reviewed independently on this visit. Allergies   Allergen Reactions   • Other Shortness Of Breath     Cats and dogs       Current Outpatient Medications:   •  apixaban (Eliquis) 5 mg, Take 1 tablet (5 mg total) by mouth 2 (two) times a day, Disp: 60 tablet, Rfl: 2  •  atorvastatin (LIPITOR) 20 mg tablet, Take 20 mg by mouth daily, Disp: , Rfl:   •  lisinopril (ZESTRIL) 5 mg tablet, TAKE ONE TABLET BY MOUTH EVERY DAY FOR BLOOD PRESSURE/HEART, Disp: , Rfl:   •  metFORMIN (GLUCOPHAGE) 500 mg tablet, Take 1,000 mg by mouth, Disp: , Rfl:   •  metoprolol tartrate (LOPRESSOR) 25 mg tablet, Take 0.5 tablets by mouth 2 (two) times a day, Disp: , Rfl:   •  Multiple Vitamins-Minerals (CENTRUM ADULTS PO), Take 50 mg by mouth, Disp: , Rfl:   •  omeprazole (PriLOSEC) 20 mg delayed release capsule, , Disp: , Rfl:   •  tadalafil (CIALIS) 10 MG tablet, Take 10 mg by mouth daily as needed for erectile dysfunction, Disp: , Rfl:     Review of Systems   Constitutional: Negative for fever. Respiratory: Positive for shortness of breath. Negative for cough. Cardiovascular: Negative for chest pain, palpitations and leg swelling. Skin: Negative for rash. Neurological: Negative for syncope. Hematological: Does not bruise/bleed easily. All other systems reviewed and are negative. Objective: There were no vitals filed for this visit. Wt Readings from Last 3 Encounters:   09/08/23 113 kg (250 lb)   07/13/23 114 kg (252 lb)   05/02/23 116 kg (255 lb)     Pulse Readings from Last 3 Encounters:   07/13/23 76   05/02/23 75   03/31/23 84     BP Readings from Last 3 Encounters:   07/13/23 100/70   05/02/23 122/78   03/31/23 110/75     Physical Exam  Constitutional:       General: He is not in acute distress. HENT:      Mouth/Throat:      Mouth: Mucous membranes are moist.   Eyes:      Conjunctiva/sclera: Conjunctivae normal.   Neck:      Vascular: No carotid bruit. Cardiovascular:      Rate and Rhythm: Rhythm irregularly irregular. Heart sounds: S1 normal and S2 normal. Murmur heard. Systolic murmur is present with a grade of 2/6. Pulmonary:      Breath sounds: Normal breath sounds. No wheezing or rhonchi. Abdominal:      General: Bowel sounds are normal.   Musculoskeletal:      Right lower leg: No edema. Left lower leg: No edema. Skin:     Findings: No lesion. Neurological:      General: No focal deficit present. Mental Status: He is alert. Psychiatric:         Mood and Affect: Mood normal.         Pertinent Laboratory/Diagnostic Studies:  Imaging Studies:     Pertinent imaging studies and cardiac studies were independently reviewed on this visit and findings summarized. Visit diagnoses  1. Persistent atrial fibrillation (HCC)  POCT ECG      2. Ascending aorta enlargement (HCC)        3. Essential hypertension        4. Mixed dyslipidemia            Dionne Hallman MD, [facility]    Portions of the record may have been created with voice recognition software.   Occasional wrong word or "sound alike" substitutions may have occurred due to the inherent limitations of voice recognition software. Read the chart carefully and recognize, using context, where substitutions have occurred. Please reach out to me directly for any clarifications.

## 2023-09-08 NOTE — PATIENT INSTRUCTIONS
Continue current cardiac medications. Report any worsening cardiac symptoms (chest pain,shortness of breath with exertion or fainting). Endocrine referral made at [facility] office  Keep follow-up as planned.

## 2023-10-20 ENCOUNTER — OFFICE VISIT (OUTPATIENT)
Dept: INTERNAL MEDICINE CLINIC | Facility: CLINIC | Age: 68
End: 2023-10-20

## 2023-10-20 VITALS
SYSTOLIC BLOOD PRESSURE: 126 MMHG | RESPIRATION RATE: 14 BRPM | OXYGEN SATURATION: 97 % | DIASTOLIC BLOOD PRESSURE: 70 MMHG | BODY MASS INDEX: 30.71 KG/M2 | HEART RATE: 92 BPM | TEMPERATURE: 99 F | WEIGHT: 259 LBS

## 2023-10-20 DIAGNOSIS — L84 CALLUS BETWEEN TOES: Primary | ICD-10-CM

## 2023-10-20 DIAGNOSIS — E11.9 TYPE 2 DIABETES MELLITUS WITHOUT COMPLICATION, WITHOUT LONG-TERM CURRENT USE OF INSULIN (HCC): ICD-10-CM

## 2023-10-20 DIAGNOSIS — H61.23 BILATERAL IMPACTED CERUMEN: ICD-10-CM

## 2023-10-20 NOTE — ASSESSMENT & PLAN NOTE
-Has been using Debrox drops without any success to get the earwax out.    -Impacted earwax was removed in the clinic with have sent a year water bottle by system plus a curette.

## 2023-10-20 NOTE — PROGRESS NOTES
Name: Sunita Stone      : 1955      MRN: [id number]  Encounter Provider: Chito Orr MD  Encounter Date: 10/20/2023   Encounter department: [address] [place] [address]     1. Callus between toes  Assessment & Plan:  -Walks 3 to 4 miles a day and has been noticing pain between his third and fourth toe of the left foot.  -He has found some relief by using a foam separator between the toes. -Patient is advised to use silicone toe separators as his third toe appears to partially overlap on the fourth resulting in friction.  -As patient is also diabetic he is advised to follow-up with podiatry at least once a year for thorough foot exam.      2. Bilateral impacted cerumen  Assessment & Plan:  -Has been using Debrox drops without any success to get the earwax out.    -Impacted earwax was removed in the clinic with have sent a year water bottle by system plus a curette. 3. Type 2 diabetes mellitus without complication, without long-term current use of insulin [facility])  Assessment & Plan:    Lab Results   Component Value Date    HGBA1C 6.2 03/10/2023   Had lab work done at the [facility], lab work is not available on [facility] for review. Patient states his A1c is around 6.6 on recent lab work. He states he has started exercising with 3 to 4 mile walks every day and has been cutting down on processed carbohydrates with a goal to lose weigh. Patient is advised to continue metformin 1000 mg daily. He encouraged to continue lifestyle changes with diet and exercise. Subjective      Mr. Jazmyne Luis is a 80-year-old male who presents to the clinic today due to pain on his left fourth while on his daily 3 to 4 mile walks. He also would like to get his ears cleaned as using Debrox drops at home have not been successful in getting the earwax out. He states he has been actively trying to make healthier dietary choices and go on daily walks.   He denies any other complaints at this time. Review of Systems   Constitutional:  Negative for activity change, appetite change and fever. HENT:  Negative for sinus pain and sore throat. Respiratory:  Negative for cough, chest tightness, shortness of breath and stridor. Cardiovascular:  Negative for chest pain, palpitations and leg swelling. Gastrointestinal:  Negative for abdominal pain, anal bleeding, constipation and diarrhea. Genitourinary:  Negative for dysuria, frequency and hematuria. Musculoskeletal:  Negative for arthralgias and back pain. Neurological:  Negative for headaches. Current Outpatient Medications on File Prior to Visit   Medication Sig    apixaban (Eliquis) 5 mg Take 1 tablet (5 mg total) by mouth 2 (two) times a day    atorvastatin (LIPITOR) 20 mg tablet Take 20 mg by mouth daily    lisinopril (ZESTRIL) 5 mg tablet TAKE ONE TABLET BY MOUTH EVERY DAY FOR BLOOD PRESSURE/HEART    metFORMIN (GLUCOPHAGE) 500 mg tablet Take 1,000 mg by mouth    metoprolol tartrate (LOPRESSOR) 25 mg tablet Take 0.5 tablets by mouth 2 (two) times a day    Multiple Vitamins-Minerals (CENTRUM ADULTS PO) Take 50 mg by mouth    omeprazole (PriLOSEC) 20 mg delayed release capsule     tadalafil (CIALIS) 10 MG tablet Take 10 mg by mouth daily as needed for erectile dysfunction       Objective     /70 (BP Location: Left arm, Patient Position: Sitting, Cuff Size: Adult)   Pulse 92   Temp 99 °F (37.2 °C) (Tympanic)   Resp 14   Wt 117 kg (259 lb)   SpO2 97%   BMI 30.71 kg/m²     Physical Exam  Constitutional:       General: He is not in acute distress. Appearance: Normal appearance. He is obese. He is not ill-appearing. HENT:      Right Ear: External ear normal. There is impacted cerumen. Left Ear: External ear normal. There is impacted cerumen. Nose: Nose normal.      Mouth/Throat:      Mouth: Mucous membranes are moist.      Pharynx: Oropharynx is clear.    Eyes:      General: No scleral icterus. Conjunctiva/sclera: Conjunctivae normal.   Cardiovascular:      Rate and Rhythm: Normal rate. Rhythm irregular. Heart sounds: No murmur heard. Pulmonary:      Effort: Pulmonary effort is normal. No respiratory distress. Breath sounds: No stridor. No wheezing. Abdominal:      General: Bowel sounds are normal. There is no distension. Palpations: Abdomen is soft. There is no mass. Tenderness: There is no abdominal tenderness. Musculoskeletal:      Right lower leg: No edema. Left lower leg: No edema. Feet:      Right foot:      Skin integrity: No ulcer, blister, skin breakdown or callus. Toenail Condition: Right toenails are abnormally thick. Left foot:      Skin integrity: Callus (on lateral surface of 3rd toe) present. No ulcer, blister or skin breakdown. Toenail Condition: Left toenails are abnormally thick. Skin:     General: Skin is warm. Neurological:      Mental Status: He is oriented to person, place, and time. Mental status is at baseline. Psychiatric:         Mood and Affect: Mood normal.         Behavior: Behavior normal.         Thought Content:  Thought content normal.       Jennifer Cee MD no

## 2023-10-20 NOTE — ASSESSMENT & PLAN NOTE
Lab Results   Component Value Date    HGBA1C 6.2 03/10/2023   Had lab work done at the [address] [place] [address] [place] recently, lab work is not available on [facility] for review. Patient states his A1c is around 6.6 on recent lab work. He states he has started exercising with 3 to 4 mile walks every day and has been cutting down on processed carbohydrates with a goal to lose weigh. Patient is advised to continue metformin 1000 mg daily. He encouraged to continue lifestyle changes with diet and exercise.

## 2023-10-20 NOTE — ASSESSMENT & PLAN NOTE
-Walks 3 to 4 miles a day and has been noticing pain between his third and fourth toe of the left foot.  -He has found some relief by using a foam separator between the toes.     -Patient is advised to use silicone toe separators as his third toe appears to partially overlap on the fourth resulting in friction.  -As patient is also diabetic he is advised to follow-up with podiatry at least once a year for thorough foot exam.

## 2023-10-27 ENCOUNTER — CONSULT (OUTPATIENT)
Dept: ENDOCRINOLOGY | Facility: CLINIC | Age: 68
End: 2023-10-27
Payer: MEDICARE

## 2023-10-27 VITALS
SYSTOLIC BLOOD PRESSURE: 126 MMHG | OXYGEN SATURATION: 98 % | HEART RATE: 66 BPM | WEIGHT: 260.8 LBS | BODY MASS INDEX: 30.93 KG/M2 | DIASTOLIC BLOOD PRESSURE: 76 MMHG

## 2023-10-27 DIAGNOSIS — E78.5 HYPERLIPIDEMIA, UNSPECIFIED HYPERLIPIDEMIA TYPE: ICD-10-CM

## 2023-10-27 DIAGNOSIS — I10 ESSENTIAL HYPERTENSION: ICD-10-CM

## 2023-10-27 DIAGNOSIS — E78.2 MIXED DYSLIPIDEMIA: ICD-10-CM

## 2023-10-27 DIAGNOSIS — E66.09 CLASS 1 OBESITY DUE TO EXCESS CALORIES WITH SERIOUS COMORBIDITY AND BODY MASS INDEX (BMI) OF 30.0 TO 30.9 IN ADULT: ICD-10-CM

## 2023-10-27 DIAGNOSIS — E11.9 TYPE 2 DIABETES MELLITUS WITHOUT COMPLICATION, WITHOUT LONG-TERM CURRENT USE OF INSULIN (HCC): Primary | ICD-10-CM

## 2023-10-27 PROBLEM — E66.811 CLASS 1 OBESITY DUE TO EXCESS CALORIES WITH SERIOUS COMORBIDITY AND BODY MASS INDEX (BMI) OF 30.0 TO 30.9 IN ADULT: Status: ACTIVE | Noted: 2023-10-27

## 2023-10-27 PROCEDURE — 99204 OFFICE O/P NEW MOD 45 MIN: CPT | Performed by: INTERNAL MEDICINE

## 2023-10-27 NOTE — PROGRESS NOTES
Arianne Reinoso 76 y.o. male MRN: [id number]    Encounter: [id number]      Assessment/Plan     Problem List Items Addressed This Visit          Endocrine    Type 2 diabetes mellitus without complication, without long-term current use of insulin ([address]) - Primary       Lab Results   Component Value Date    HGBA1C 6.2 03/10/2023   Diabetes is well controlled-he has had recent fasting hyperglycemia due to dietary indiscretions. For now continue metformin at current dose, referred for medical nutrition therapy         Relevant Orders    Ambulatory referral to Diabetic Education    Albumin / creatinine urine ratio       Cardiovascular and Mediastinum    Essential hypertension     Blood pressure at goal-we will check urine microalbumin to creatinine ratio            Other    Class 1 obesity due to excess calories with serious comorbidity and body mass index (BMI) of 30.0 to 30.9 in adult    Relevant Orders    Ambulatory referral to Diabetic Education    Hyperlipidemia    Relevant Orders    Ambulatory referral to Diabetic Education    Mixed dyslipidemia     Continue statins             CC: Diabetes    History of Present Illness     HPI: 43-year-old male with type 2 diabetes here for evaluation.   He was diagnosed with type 2 DM in 2017 and has been treated with oral hypoglycemics  Currently on  metformin 500 mg with breakfast and dinner and tolerating without any significant GI side effects  Checks f.s fasting - 140-150s occasional excursions in 170s-200 due to dietary indiscretions  No polyuria , polydipsia , no blurry vision , no numbness and tingling in feet     Weight gain - 5 lbs in the past few months     Occasional diarrhea     No CAD/CVA          Review of Systems    Historical Information   Past Medical History:   Diagnosis Date    Appendicitis     Atrial fibrillation ([address])     Colon polyp     Diabetes mellitus ([address])     Hyperlipidemia     Hypertension      Past Surgical History:   Procedure Laterality Date APPENDECTOMY      CHOLECYSTECTOMY      COLONOSCOPY       Social History   Social History     Substance and Sexual Activity   Alcohol Use Not Currently     Social History     Substance and Sexual Activity   Drug Use Never     Social History     Tobacco Use   Smoking Status Never   Smokeless Tobacco Never     Family History:   Family History   Problem Relation Age of Onset    Throat cancer Father     Prostate cancer Brother     Alcohol abuse Brother        Meds/Allergies   Current Outpatient Medications   Medication Sig Dispense Refill    apixaban (Eliquis) 5 mg Take 1 tablet (5 mg total) by mouth 2 (two) times a day 60 tablet 2    atorvastatin (LIPITOR) 20 mg tablet Take 20 mg by mouth daily      lisinopril (ZESTRIL) 5 mg tablet TAKE ONE TABLET BY MOUTH EVERY DAY FOR BLOOD PRESSURE/HEART      metFORMIN (GLUCOPHAGE) 500 mg tablet Take 1,000 mg by mouth      metoprolol tartrate (LOPRESSOR) 25 mg tablet Take 0.5 tablets by mouth 2 (two) times a day      Multiple Vitamins-Minerals (CENTRUM ADULTS PO) Take 50 mg by mouth      omeprazole (PriLOSEC) 20 mg delayed release capsule       tadalafil (CIALIS) 10 MG tablet Take 10 mg by mouth daily as needed for erectile dysfunction       No current facility-administered medications for this visit. Allergies   Allergen Reactions    Other Shortness Of Breath     Cats and dogs       Objective   Vitals: Blood pressure 126/76, pulse 66, weight 118 kg (260 lb 12.8 oz), SpO2 98 %. Physical Exam  Vitals reviewed. Constitutional:       General: He is not in acute distress. Appearance: Normal appearance. He is obese. He is not ill-appearing, toxic-appearing or diaphoretic. HENT:      Head: Normocephalic and atraumatic. Eyes:      General: No scleral icterus. Extraocular Movements: Extraocular movements intact. Cardiovascular:      Rate and Rhythm: Normal rate and regular rhythm. Heart sounds: Normal heart sounds. No murmur heard.   Pulmonary:      Effort: Pulmonary effort is normal. No respiratory distress. Breath sounds: Normal breath sounds. No wheezing or rales. Abdominal:      General: There is no distension. Palpations: Abdomen is soft. Tenderness: There is no abdominal tenderness. Musculoskeletal:      Cervical back: Neck supple. Right lower leg: No edema. Left lower leg: No edema. Lymphadenopathy:      Cervical: No cervical adenopathy. Skin:     General: Skin is warm and dry. Neurological:      General: No focal deficit present. Mental Status: He is alert and oriented to person, place, and time. Gait: Gait normal.   Psychiatric:         Mood and Affect: Mood normal.         Behavior: Behavior normal.         Thought Content: Thought content normal.         Judgment: Judgment normal.         The history was obtained from the review of the chart, patient. Lab Results:   Lab Results   Component Value Date/Time    Hemoglobin A1C 6.2 03/10/2023 12:00 AM       Sept 8th 2023 - glucose 113 bun/cr - 18/1.1   TGS -127 MG/DL  Hdl-44  LDL- 59 MG/DL  A1c 6.6       Imaging Studies:     Portions of the record may have been created with voice recognition software. Occasional wrong word or "sound a like" substitutions may have occurred due to the inherent limitations of voice recognition software. Read the chart carefully and recognize, using context, where substitutions have occurred.

## 2023-10-27 NOTE — ASSESSMENT & PLAN NOTE
Lab Results   Component Value Date    HGBA1C 6.2 03/10/2023   Diabetes is well controlled-he has had recent fasting hyperglycemia due to dietary indiscretions.   For now continue metformin at current dose, referred for medical nutrition therapy

## 2023-10-29 ENCOUNTER — LAB (OUTPATIENT)
Dept: LAB | Facility: CLINIC | Age: 68
End: 2023-10-29
Payer: MEDICARE

## 2023-10-29 DIAGNOSIS — E11.9 TYPE 2 DIABETES MELLITUS WITHOUT COMPLICATION, WITHOUT LONG-TERM CURRENT USE OF INSULIN (HCC): ICD-10-CM

## 2023-10-29 LAB
CREAT UR-MCNC: 131.3 MG/DL
MICROALBUMIN UR-MCNC: 28 MG/L
MICROALBUMIN/CREAT 24H UR: 21 MG/G CREATININE (ref 0–30)

## 2023-10-29 PROCEDURE — 82570 ASSAY OF URINE CREATININE: CPT

## 2023-10-29 PROCEDURE — 82043 UR ALBUMIN QUANTITATIVE: CPT

## 2023-11-03 ENCOUNTER — APPOINTMENT (OUTPATIENT)
Dept: LAB | Facility: CLINIC | Age: 68
End: 2023-11-03
Payer: MEDICARE

## 2023-11-03 DIAGNOSIS — E78.2 MIXED DYSLIPIDEMIA: ICD-10-CM

## 2023-11-03 LAB
CHOLEST SERPL-MCNC: 110 MG/DL
HDLC SERPL-MCNC: 46 MG/DL
LDLC SERPL CALC-MCNC: 39 MG/DL (ref 0–100)
NONHDLC SERPL-MCNC: 64 MG/DL
TRIGL SERPL-MCNC: 124 MG/DL

## 2023-11-03 PROCEDURE — 36415 COLL VENOUS BLD VENIPUNCTURE: CPT

## 2023-11-03 PROCEDURE — 80061 LIPID PANEL: CPT

## 2023-11-24 ENCOUNTER — OFFICE VISIT (OUTPATIENT)
Dept: INTERNAL MEDICINE CLINIC | Facility: CLINIC | Age: 68
End: 2023-11-24
Payer: MEDICARE

## 2023-11-24 VITALS
HEIGHT: 77 IN | DIASTOLIC BLOOD PRESSURE: 78 MMHG | SYSTOLIC BLOOD PRESSURE: 130 MMHG | HEART RATE: 83 BPM | TEMPERATURE: 98.7 F | WEIGHT: 258 LBS | RESPIRATION RATE: 16 BRPM | OXYGEN SATURATION: 98 % | BODY MASS INDEX: 30.46 KG/M2

## 2023-11-24 DIAGNOSIS — E11.628 TYPE 2 DIABETES MELLITUS WITH OTHER SKIN COMPLICATIONS (HCC): ICD-10-CM

## 2023-11-24 DIAGNOSIS — N52.9 ERECTILE DYSFUNCTION, UNSPECIFIED ERECTILE DYSFUNCTION TYPE: Primary | ICD-10-CM

## 2023-11-24 PROCEDURE — 99213 OFFICE O/P EST LOW 20 MIN: CPT | Performed by: INTERNAL MEDICINE

## 2023-11-24 NOTE — ASSESSMENT & PLAN NOTE
For now continue with cialis. Referral to urology for other options of treatment since the pills have not been satisfactory. Last PSA 2.28 and UA this year was wnl. Will defer further workup if necessary to urology.

## 2023-11-24 NOTE — PROGRESS NOTES
Assessment/Plan:    Erectile dysfunction  For now continue with cialis. Referral to urology for other options of treatment since the pills have not been satisfactory. Last PSA 2.28 and UA this year was wnl. Will defer further workup if necessary to urology. Diagnoses and all orders for this visit:    Erectile dysfunction, unspecified erectile dysfunction type  -     Ambulatory Referral to Urology; Future    Type 2 diabetes mellitus with other skin complications (HCC)          Subjective:      Patient ID: Lillie Curtis is a 76 y.o. male. Patient presents in the office with complaining of erectile dysfunction, which is an old issue and he has used Viagra and Cialis but states that they do not work so well. He is able to achieve erection but is not able to complete sexual intercourse, or maintain the erection. He has a new partner, which is "tight" and she is also working with her GYN  for more satisfactory intercourse. He would like something that would make the erection lasts longer with less stimulation. He has tried higher doses of Cialis, which works sometimes. He is concerned about his testosterone levels, states he never had his levels checked. Recent blood work reviewed did not identify PSA, but urinalysis was normal.          The following portions of the patient's history were reviewed and updated as appropriate: allergies, current medications, past family history, past medical history, past social history, past surgical history, and problem list.    Review of Systems   Constitutional:  Negative for fatigue. Respiratory:  Negative for chest tightness. Cardiovascular:  Negative for chest pain. Gastrointestinal:  Negative for abdominal pain, nausea and vomiting. Genitourinary:  Negative for scrotal swelling and testicular pain. Erectile dysfunction   Skin:  Negative for rash.          Objective:      /78 (BP Location: Left arm, Patient Position: Sitting, Cuff Size: Adult)   Pulse 83   Temp 98.7 °F (37.1 °C) (Tympanic)   Resp 16   Ht 6' 5" (1.956 m)   Wt 117 kg (258 lb)   SpO2 98%   BMI 30.59 kg/m²          Physical Exam  Vitals and nursing note reviewed. Constitutional:       General: He is not in acute distress. HENT:      Head: Normocephalic and atraumatic. Eyes:      Conjunctiva/sclera: Conjunctivae normal.      Pupils: Pupils are equal, round, and reactive to light. Cardiovascular:      Rate and Rhythm: Normal rate and regular rhythm. Pulmonary:      Effort: Pulmonary effort is normal. No respiratory distress. Abdominal:      General: There is no distension. Tenderness: There is no abdominal tenderness. Neurological:      General: No focal deficit present. Mental Status: He is alert and oriented to person, place, and time. Psychiatric:         Behavior: Behavior normal.         Thought Content:  Thought content normal.         Judgment: Judgment normal.

## 2023-11-30 ENCOUNTER — OFFICE VISIT (OUTPATIENT)
Dept: DIABETES SERVICES | Facility: CLINIC | Age: 68
End: 2023-11-30
Payer: MEDICARE

## 2023-11-30 VITALS — WEIGHT: 260 LBS | BODY MASS INDEX: 30.83 KG/M2

## 2023-11-30 DIAGNOSIS — E66.09 CLASS 1 OBESITY DUE TO EXCESS CALORIES WITH SERIOUS COMORBIDITY AND BODY MASS INDEX (BMI) OF 30.0 TO 30.9 IN ADULT: ICD-10-CM

## 2023-11-30 DIAGNOSIS — E11.9 TYPE 2 DIABETES MELLITUS WITHOUT COMPLICATION, WITHOUT LONG-TERM CURRENT USE OF INSULIN (HCC): Primary | ICD-10-CM

## 2023-11-30 DIAGNOSIS — E78.5 HYPERLIPIDEMIA, UNSPECIFIED HYPERLIPIDEMIA TYPE: ICD-10-CM

## 2023-11-30 PROCEDURE — 97802 MEDICAL NUTRITION INDIV IN: CPT

## 2023-11-30 NOTE — PROGRESS NOTES
Medical Nutrition Therapy    Assessment    Visit Type: Initial visit  Chief complaint/Medical Diagnosis/reason for visit:   E11.9 (ICD-10-CM) - Type 2 diabetes mellitus without complication, without long-term current use of insulin ([address])   E78.5 (ICD-10-CM) - Hyperlipidemia, unspecified hyperlipidemia type   E66.09, Z68.30 (ICD-10-CM) - Class 1 obesity due to excess calories with serious comorbidity and body mass index (BMI) of 30.0 to 30.9 in adult     HPI Penelope Mitchell was seen today unaccompanied regarding type 2 diabetes. He reports he was diagnosed with diabetes in September 2017. Patient is currently taking 500 mg of metformin daily. Last HbA1c was 6.2% in March 2023. Conducted dietary recall. See below for details. Problems identified in food recall include inconsistent carbohydrate intake. Explained basic pathophysiology of diabetes and impact of diet on blood glucose levels. Together we discussed what foods contain carbohydrates, reading a food label, timing of meals and snacks, serving sizes, the role of fiber, the importance of consistent carbohydrate intake in the appropriate amounts. Used the portion booklet to teach Ray more about food groups and basic carbohydrate counting. Encouraged 3 regular meals ~4-5 hours apart with 1-2 snacks per day as needed. Discussed keeping carbohydrate intake consistent. Goal is 45-60 grams of carbohydrate per meal and 0-15 grams of carbohydrate per snack. Samir Brooke demonstrated good understanding and will call with any questions or if he would like to schedule a follow-up visit. Ht Readings from Last 1 Encounters:   11/24/23 6' 5" (1.956 m)     Wt Readings from Last 3 Encounters:   11/24/23 117 kg (258 lb)   10/27/23 118 kg (260 lb 12.8 oz)   10/20/23 117 kg (259 lb)     Weight Change: Weight is up 14 pounds over the past year per EMR review.     Barriers to Learning: no barriers    Do you follow any special diet presently?: Yes - tries to limit sugar intake  Who shops: patient and significant other  Who cooks: patient and significant other    Food Log: Completed via the method of usual daily food recall    Breakfast: 2 eggs over easy with 3 sausage links, 1 slice of artisan bread, blueberries, 2 cups of coffee with sugar-free creamer  Morning Snack: none  Lunch: [place] protein shake (7 grams CHO/shake), crackers, sometimes fruit - peaches, pears, apples  Afternoon Snack: none  Dinner: usually chicken or fish with a vegetable - broccoli, green beans, sometimes a starch such as small potatoes, baked potato or sweet potato  Evening Snack: popcorn, townhouse crackers/ritz crackers, ice cream  Beverages: water, 2 cups of coffee with sugar-free creamer, unsweetened iced tea  Eating out/Take out: once a week  Exercise: Walks 3-4 miles a day several times a week    Estimated calorie needs: 2,000 kcals/day   Carbohydrate: 45-60 g/meal, 0-15 g/snack       Fat: 5 servings/day      Protein: 10 ounces/day    Nutrition Diagnosis:  Inconsistent carbohydrate intake related to food and nutrition related knowledge deficit concerning appropriate amount and timing of carbohydrate intake as evidenced by estimated carbohydrate intake that is different from recommended types or ingested on an irregular basis. Intervention: increased fiber intake, label reading, behavior modification strategies, carbohydrate counting, meal timing, meal planning, monitoring portion control, and exercise guidelines     Treatment Goals: Patient understands education and recommendations, Patient will consume 3 meals a day, Patient will monitor portion control, Patient will consume snacks, Patient will count carbohydrates, Patient will exercise, and Patient will monitor blood glucose    Monitoring and evaluation:    Term code indicator  FH 4.4 Mealtime Behavior Criteria: Eat 3 regular meals ~4-5 hours apart with 1-2 snacks per day as needed.   Term code indicator  FH 1.6.3 Carbohydrate Intake Criteria: Keep carbohydrate intake consistent. Aim for 45-60 grams of carbohydrate per meal and 0-15 grams of carbohydrate per snack. Materials Provided: Portion booklet    Patient’s Response to Instruction:  Comprehension: good  Motivation: good  Expected Compliance: good    Start- Stop: 10:30-11:30  Total Minutes: 60 Minutes  Group or Individual Instruction: MNT-I  Other: Sunitha Zepeda MD    Thank you for coming to the [address] [place] [address] for education today. Please feel free to call with any questions or concerns.     Alyssa Gold, MS, RD, LDN  [address] [facility] [address]

## 2023-12-19 PROBLEM — H61.23 BILATERAL IMPACTED CERUMEN: Status: RESOLVED | Noted: 2023-01-19 | Resolved: 2023-12-19

## 2023-12-28 ENCOUNTER — OFFICE VISIT (OUTPATIENT)
Dept: PULMONOLOGY | Facility: CLINIC | Age: 68
End: 2023-12-28
Payer: MEDICARE

## 2023-12-28 ENCOUNTER — TELEPHONE (OUTPATIENT)
Dept: CARDIOLOGY CLINIC | Facility: CLINIC | Age: 68
End: 2023-12-28

## 2023-12-28 ENCOUNTER — APPOINTMENT (OUTPATIENT)
Dept: URGENT CARE | Facility: CLINIC | Age: 68
End: 2023-12-28

## 2023-12-28 VITALS
DIASTOLIC BLOOD PRESSURE: 82 MMHG | RESPIRATION RATE: 16 BRPM | WEIGHT: 255 LBS | OXYGEN SATURATION: 97 % | BODY MASS INDEX: 30.11 KG/M2 | SYSTOLIC BLOOD PRESSURE: 124 MMHG | TEMPERATURE: 97 F | HEART RATE: 81 BPM | HEIGHT: 77 IN

## 2023-12-28 DIAGNOSIS — E66.09 CLASS 1 OBESITY DUE TO EXCESS CALORIES WITH SERIOUS COMORBIDITY AND BODY MASS INDEX (BMI) OF 30.0 TO 30.9 IN ADULT: ICD-10-CM

## 2023-12-28 DIAGNOSIS — G47.33 OSA (OBSTRUCTIVE SLEEP APNEA): Primary | ICD-10-CM

## 2023-12-28 DIAGNOSIS — I48.19 PERSISTENT ATRIAL FIBRILLATION (HCC): ICD-10-CM

## 2023-12-28 PROCEDURE — 99213 OFFICE O/P EST LOW 20 MIN: CPT | Performed by: INTERNAL MEDICINE

## 2023-12-28 NOTE — ASSESSMENT & PLAN NOTE
He has persistent atrial fibrillation and this is controlled continue rate control with metoprolol.  He is on systemic anticoagulation with apixaban

## 2023-12-28 NOTE — PROGRESS NOTES
Assessment/Plan:    YEHUDA (obstructive sleep apnea)  Mr. Ramakrishna Schuster had snoring and nocturnal choking.  He was diagnosed with paroxysmal atrial fibrillation about a year back.  He also had diabetes and hypertension and hypercholesterolemia.  He is a .  He denied any daytime sleepiness or tiredness.  His [place] sleepiness score is 1 out of 24.  On clinical examination he had oropharyngeal crowding.  Overnight sleep study showed mild to moderate obstructive sleep apnea oxygen desaturation.  His overall AHI was in the mild range, his REM AHI was in the severe range.    Currently he is on CPAP therapy and is getting clinical benefit from therapy.  His compliance is excellent and his residual AHI low.  He has occasional leak.  I had a long discussion with him and answered all his questions.  I advised him to continue CPAP therapy.  I have given him a copy of the sleep study and compliance records at his request.  He has CDL driving license.    Persistent atrial fibrillation (HCC)  He has persistent atrial fibrillation and this is controlled continue rate control with metoprolol.  He is on systemic anticoagulation with apixaban    Class 1 obesity due to excess calories with serious comorbidity and body mass index (BMI) of 30.0 to 30.9 in adult  He is mildly obese and understands the need for weight reduction.  He understands that weight reduction can significantly improve sleep apnea and other symptoms.       Diagnoses and all orders for this visit:    YEHUDA (obstructive sleep apnea)    Persistent atrial fibrillation (HCC)    Class 1 obesity due to excess calories with serious comorbidity and body mass index (BMI) of 30.0 to 30.9 in adult          Subjective:      Patient ID: Heath Schuster is a 68 y.o. male.    Mr. Ramakrishna Schuster came for follow-up for his obstructive sleep apnea of mild degree currently on auto CPAP 5 to 15 cm of water.  He stated that he does not like the CPAP but is using it every night.  He  has no significant daytime sleepiness or morning headache.  He is comfortable with the mask and pressure.  He has occasional leak.  I reviewed his CPAP compliance records and they are excellent.  His residual AHI was 1.6.  He is very motivated to continue on CPAP therapy.  The [facility] is his DME.  He has persistent atrial fibrillation and is on treatment with metoprolol regularly.  He is also on systemic anticoagulation with Eliquis.  He is mildly obese and understands the need for weight reduction.  He was asking me about I nap.  He had questions about inspire and I explained to him.  He is a  and [facility]        The following portions of the patient's history were reviewed and updated as appropriate: allergies, current medications, past family history, past medical history, past social history, past surgical history, and problem list.    Review of Systems   Constitutional:  Negative for appetite change, chills, fatigue, fever and unexpected weight change.   HENT:  Negative for hearing loss, rhinorrhea, sneezing, sore throat and trouble swallowing.    Eyes:  Negative for visual disturbance.   Respiratory:  Negative for cough, chest tightness, shortness of breath and wheezing.    Cardiovascular:  Negative for chest pain, palpitations and leg swelling.   Gastrointestinal:  Negative for abdominal pain, constipation, diarrhea, nausea and vomiting.   Genitourinary:  Negative for dysuria, frequency and urgency.   Musculoskeletal:  Positive for arthralgias. Negative for back pain and joint swelling.   Skin:  Negative for rash.   Allergic/Immunologic: Positive for environmental allergies.   Neurological:  Negative for dizziness, syncope, light-headedness and headaches.   Psychiatric/Behavioral:  Negative for agitation, confusion and sleep disturbance. The patient is not nervous/anxious.          Objective:      /82 (BP Location: Left arm, Patient Position: Sitting, Cuff  "Size: Standard)   Pulse 81   Temp (!) 97 °F (36.1 °C) (Tympanic)   Resp 16   Ht 6' 5\" (1.956 m)   Wt 116 kg (255 lb)   SpO2 97%   BMI 30.24 kg/m²          Physical Exam  Vitals reviewed.   Constitutional:       General: He is not in acute distress.     Appearance: He is obese. He is not ill-appearing, toxic-appearing or diaphoretic.   HENT:      Head: Normocephalic.      Mouth/Throat:      Mouth: Mucous membranes are moist.   Eyes:      General: No scleral icterus.     Conjunctiva/sclera: Conjunctivae normal.   Cardiovascular:      Rate and Rhythm: Normal rate and regular rhythm.      Heart sounds: Normal heart sounds. No murmur heard.  Pulmonary:      Effort: Pulmonary effort is normal. No respiratory distress.      Breath sounds: Normal breath sounds. No stridor. No wheezing, rhonchi or rales.   Abdominal:      General: Bowel sounds are normal.      Palpations: Abdomen is soft.      Tenderness: There is no abdominal tenderness. There is no guarding.   Musculoskeletal:      Cervical back: Neck supple. No rigidity.      Right lower leg: No edema.      Left lower leg: No edema.   Lymphadenopathy:      Cervical: No cervical adenopathy.   Skin:     Coloration: Skin is not jaundiced or pale.      Findings: No rash.   Neurological:      Mental Status: He is alert and oriented to person, place, and time.      Gait: Gait normal.   Psychiatric:         Mood and Affect: Mood normal.         Behavior: Behavior normal.         Thought Content: Thought content normal.         Judgment: Judgment normal.           "

## 2023-12-28 NOTE — TELEPHONE ENCOUNTER
Form was filled out by Dr. Castaneda on the same day it was dropped off. Faxed to [facility] and informed pt of completion. Said he will come in to pick it up either today or tomorrow.

## 2023-12-28 NOTE — ASSESSMENT & PLAN NOTE
He is mildly obese and understands the need for weight reduction.  He understands that weight reduction can significantly improve sleep apnea and other symptoms.

## 2023-12-28 NOTE — TELEPHONE ENCOUNTER
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 Castaneda. Is requesting we complete as soon as possible and fax to [facility] in [place] at [phone]. Would also like a call so that he can come  completed form at office. Blank copy scanned into chart and attached to this encounter, with original being provided to MA.

## 2023-12-28 NOTE — ASSESSMENT & PLAN NOTE
Mr. Ramakrishna Schuster had snoring and nocturnal choking.  He was diagnosed with paroxysmal atrial fibrillation about a year back.  He also had diabetes and hypertension and hypercholesterolemia.  He is a .  He denied any daytime sleepiness or tiredness.  His [place] sleepiness score is 1 out of 24.  On clinical examination he had oropharyngeal crowding.  Overnight sleep study showed mild to moderate obstructive sleep apnea oxygen desaturation.  His overall AHI was in the mild range, his REM AHI was in the severe range.    Currently he is on CPAP therapy and is getting clinical benefit from therapy.  His compliance is excellent and his residual AHI low.  He has occasional leak.  I had a long discussion with him and answered all his questions.  I advised him to continue CPAP therapy.  I have given him a copy of the sleep study and compliance records at his request.  He has CDL driving license.

## 2024-01-11 NOTE — PROGRESS NOTES
"     Problem List Items Addressed This Visit       Type 2 diabetes mellitus without complication, without long-term current use of insulin ([facility]) - Primary       Lab Results   Component Value Date    HGBA1C 6.2 03/10/2023     This can contribute to endothelial dysfunction as well as neuropathy which can affect sexual function         Erectile dysfunction     Taking Cialis 20 mg currently.  Does have an older partner, currently 70 years old, she is undergoing therapy with intravaginal estrogen replacement therapy.  Changes in her anatomy have made penetrative intercourse difficult.  He has trouble with maintenance of erections.  I did speak with him about intracavernosal injection therapy as well as penile prosthesis placement and compressive penile rings.  We will arrange for [employer] teaching as he does express interest in this treatment         YEHUDA (obstructive sleep apnea)     Use positive airway pressure therapy at this time.  Sleep apnea can cause endothelial dysfunction and worsening metabolic syndrome with poor sexual function         Class 1 obesity due to excess calories with serious comorbidity and body mass index (BMI) of 30.0 to 30.9 in adult     I did speak with him about the relationship between excess body weight and decrease sexual function.  He is attempting to have a more active lifestyle and to lose weight currently         Hyperlipidemia     Can contribute to poor sexual function                  Portions of the above record have been created with voice recognition software.  Occasional wrong word or \"sound alike\" substitution may have occurred due to the inherent limitations of voice recognition software.  Read the chart carefully and recognize, using context, where substitution may have occurred.    Assessment and plan:       Please see problem oriented charting for the assessment plan of today's urological complaints      Olvin Pugh MD      Chief Complaint     As listed above      History " of Present Illness     Heath Schuster is a 68 y.o. man presenting in consultation for erectile dysfunction.    The patient was referred by Fouzia Mcgee MD and today's note has been sent to the referring provider.    With regard to this complaint it is localized to the penis.  The quality is described as trouble maintaining an erection and the severity of this complaint is described as moderate.  These symptoms have been present for months and the timing is ongoing. Previous treatments include Cialis and previous work-up includes primary care setting evaluation as well as lab work.  The patient mentions nothing as aggravating and alleviating factors, respectively.  The following associated signs and symptoms are mentioned: None.    The following portions of the patient's history were reviewed and updated as appropriate: allergies, current medications, past family history, past medical history, past social history, past surgical history and problem list.    Detailed Urologic History     - please refer to HPI    Review of Systems     Review of Systems   Constitutional: Negative.    HENT: Negative.     Eyes: Negative.    Respiratory: Negative.     Cardiovascular: Negative.    Gastrointestinal: Negative.    Endocrine: Negative.    Genitourinary: Negative.    Musculoskeletal: Negative.    Skin: Negative.    Allergic/Immunologic: Negative.    Neurological: Negative.    Hematological: Negative.    Psychiatric/Behavioral: Negative.         AUA SYMPTOM SCORE      Flowsheet Row Most Recent Value   AUA SYMPTOM SCORE    How often have you had a sensation of not emptying your bladder completely after you finished urinating? 0 (P)    How often have you had to urinate again less than two hours after you finished urinating? 1 (P)    How often have you found you stopped and started again several times when you urinate? 0 (P)    How often have you found it difficult to postpone urination? 0 (P)    How often have you had a  "weak urinary stream? 0 (P)    How often have you had to push or strain to begin urination? 0 (P)    How many times did you most typically get up to urinate from the time you went to bed at night until the time you got up in the morning? 1 (P)    Quality of Life: If you were to spend the rest of your life with your urinary condition just the way it is now, how would you feel about that? 1 (P)    AUA SYMPTOM SCORE 2 (P)               Allergies     Allergies   Allergen Reactions    Other Shortness Of Breath     Cats and dogs       Physical Exam     Physical Exam  Vitals and nursing note reviewed.   Constitutional:       General: He is not in acute distress.     Appearance: Normal appearance. He is well-developed. He is not ill-appearing, toxic-appearing or diaphoretic.   HENT:      Head: Normocephalic and atraumatic.   Eyes:      General: No scleral icterus.  Pulmonary:      Effort: Pulmonary effort is normal. No respiratory distress.   Abdominal:      General: There is no distension.      Palpations: Abdomen is soft.      Tenderness: There is no abdominal tenderness.   Genitourinary:     Comments: Normal orly stage, no penile plaques, normal testes, prostate 35 grams, smooth  Musculoskeletal:         General: No deformity.      Cervical back: Neck supple.   Skin:     Coloration: Skin is not jaundiced or pale.   Neurological:      Mental Status: He is alert and oriented to person, place, and time. Mental status is at baseline.      Cranial Nerves: No cranial nerve deficit.      Motor: No weakness.      Coordination: Coordination normal.   Psychiatric:         Mood and Affect: Mood normal.         Behavior: Behavior normal.         Thought Content: Thought content normal.         Judgment: Judgment normal.             Vital Signs  Vitals:    01/12/24 1107   BP: 110/80   BP Location: Left arm   Patient Position: Standing   Cuff Size: Standard   Pulse: 60   SpO2: 97%   Weight: 114 kg (251 lb)   Height: 6' 5\" (1.956 m) "         Current Medications       Current Outpatient Medications:     atorvastatin (LIPITOR) 20 mg tablet, Take 20 mg by mouth daily, Disp: , Rfl:     lisinopril (ZESTRIL) 5 mg tablet, TAKE ONE TABLET BY MOUTH EVERY DAY FOR BLOOD PRESSURE/HEART, Disp: , Rfl:     metFORMIN (GLUCOPHAGE) 500 mg tablet, Take 1,000 mg by mouth, Disp: , Rfl:     metoprolol tartrate (LOPRESSOR) 25 mg tablet, Take 0.5 tablets by mouth 2 (two) times a day, Disp: , Rfl:     Multiple Vitamins-Minerals (CENTRUM ADULTS PO), Take 50 mg by mouth, Disp: , Rfl:     omeprazole (PriLOSEC) 20 mg delayed release capsule, , Disp: , Rfl:     tadalafil (CIALIS) 10 MG tablet, Take 10 mg by mouth daily as needed for erectile dysfunction, Disp: , Rfl:     apixaban (Eliquis) 5 mg, Take 1 tablet (5 mg total) by mouth 2 (two) times a day, Disp: 60 tablet, Rfl: 2      Active Problems     Patient Active Problem List   Diagnosis    Encounter for colonoscopy due to history of colonic polyp    Type 2 diabetes mellitus without complication, without long-term current use of insulin (HCC)    Elbow pain, chronic, right    Persistent atrial fibrillation (HCC)    Essential hypertension    Mixed dyslipidemia    Ascending aorta enlargement (HCC)    COVID-19    HSV-1 (herpes simplex virus 1) infection    Gout    Erectile dysfunction    History of appendectomy    Skin tag    Paroxysmal atrial fibrillation (HCC)    YEHUDA (obstructive sleep apnea)    Shoulder pain, right    Asymptomatic varicose veins of right lower extremity    Callus between toes    Class 1 obesity due to excess calories with serious comorbidity and body mass index (BMI) of 30.0 to 30.9 in adult    Hyperlipidemia         Past Medical History     Past Medical History:   Diagnosis Date    Appendicitis     Atrial fibrillation (HCC)     Colon polyp     Diabetes mellitus (HCC)     Hyperlipidemia     Hypertension          Surgical History     Past Surgical History:   Procedure Laterality Date    APPENDECTOMY       CHOLECYSTECTOMY      COLONOSCOPY           Family History     Family History   Problem Relation Age of Onset    Throat cancer Father     Prostate cancer Brother     Alcohol abuse Brother          Social History     Social History     Social History     Tobacco Use   Smoking Status Never    Passive exposure: Never   Smokeless Tobacco Never         Pertinent Lab Values     Lab Results   Component Value Date    CREATININE 1.05 10/30/2017       Lab Results   Component Value Date    PSA 1.6 09/20/2017               Pertinent Imaging     No urologic imaging for my review

## 2024-01-12 ENCOUNTER — TELEPHONE (OUTPATIENT)
Dept: UROLOGY | Facility: CLINIC | Age: 69
End: 2024-01-12

## 2024-01-12 ENCOUNTER — CONSULT (OUTPATIENT)
Dept: UROLOGY | Facility: CLINIC | Age: 69
End: 2024-01-12
Payer: MEDICARE

## 2024-01-12 VITALS
HEIGHT: 77 IN | WEIGHT: 251 LBS | SYSTOLIC BLOOD PRESSURE: 110 MMHG | HEART RATE: 60 BPM | DIASTOLIC BLOOD PRESSURE: 80 MMHG | BODY MASS INDEX: 29.64 KG/M2 | OXYGEN SATURATION: 97 %

## 2024-01-12 DIAGNOSIS — E78.5 HYPERLIPIDEMIA, UNSPECIFIED HYPERLIPIDEMIA TYPE: ICD-10-CM

## 2024-01-12 DIAGNOSIS — E66.09 CLASS 1 OBESITY DUE TO EXCESS CALORIES WITH SERIOUS COMORBIDITY AND BODY MASS INDEX (BMI) OF 30.0 TO 30.9 IN ADULT: ICD-10-CM

## 2024-01-12 DIAGNOSIS — G47.33 OSA (OBSTRUCTIVE SLEEP APNEA): ICD-10-CM

## 2024-01-12 DIAGNOSIS — E11.9 TYPE 2 DIABETES MELLITUS WITHOUT COMPLICATION, WITHOUT LONG-TERM CURRENT USE OF INSULIN (HCC): Primary | ICD-10-CM

## 2024-01-12 DIAGNOSIS — N52.9 ERECTILE DYSFUNCTION, UNSPECIFIED ERECTILE DYSFUNCTION TYPE: ICD-10-CM

## 2024-01-12 PROCEDURE — 99204 OFFICE O/P NEW MOD 45 MIN: CPT | Performed by: UROLOGY

## 2024-01-12 RX ORDER — TADALAFIL 20 MG/1
20 TABLET ORAL
COMMUNITY
Start: 2023-12-14

## 2024-01-12 RX ORDER — NICOTINE POLACRILEX 4 MG/1
GUM, CHEWING ORAL
COMMUNITY

## 2024-01-12 RX ORDER — TRIAMCINOLONE ACETONIDE 1 MG/G
OINTMENT TOPICAL
COMMUNITY
Start: 2023-12-05

## 2024-01-12 NOTE — ASSESSMENT & PLAN NOTE
Taking Cialis 20 mg currently.  Does have an older partner, currently 70 years old, she is undergoing therapy with intravaginal estrogen replacement therapy.  Changes in her anatomy have made penetrative intercourse difficult.  He has trouble with maintenance of erections.  I did speak with him about intracavernosal injection therapy as well as penile prosthesis placement and compressive penile rings.  We will arrange for [employer] teaching as he does express interest in this treatment

## 2024-01-12 NOTE — ASSESSMENT & PLAN NOTE
Lab Results   Component Value Date    HGBA1C 6.2 03/10/2023     This can contribute to endothelial dysfunction as well as neuropathy which can affect sexual function

## 2024-01-12 NOTE — ASSESSMENT & PLAN NOTE
I did speak with him about the relationship between excess body weight and decrease sexual function.  He is attempting to have a more active lifestyle and to lose weight currently

## 2024-01-12 NOTE — ASSESSMENT & PLAN NOTE
Use positive airway pressure therapy at this time.  Sleep apnea can cause endothelial dysfunction and worsening metabolic syndrome with poor sexual function

## 2024-01-15 NOTE — TELEPHONE ENCOUNTER
APPT WAS ALREADY SCHEDULED. PT WAS NOTIFIED THAT IT WAS SENT TO [facility].

## 2024-02-05 ENCOUNTER — OFFICE VISIT (OUTPATIENT)
Dept: INTERNAL MEDICINE CLINIC | Facility: CLINIC | Age: 69
End: 2024-02-05
Payer: MEDICARE

## 2024-02-05 VITALS
SYSTOLIC BLOOD PRESSURE: 122 MMHG | RESPIRATION RATE: 16 BRPM | HEART RATE: 96 BPM | WEIGHT: 254 LBS | OXYGEN SATURATION: 98 % | TEMPERATURE: 99.1 F | DIASTOLIC BLOOD PRESSURE: 70 MMHG | BODY MASS INDEX: 30.12 KG/M2

## 2024-02-05 DIAGNOSIS — U07.1 COVID: ICD-10-CM

## 2024-02-05 DIAGNOSIS — R09.89 SYMPTOMS OF UPPER RESPIRATORY INFECTION (URI): Primary | ICD-10-CM

## 2024-02-05 PROCEDURE — 99213 OFFICE O/P EST LOW 20 MIN: CPT

## 2024-02-05 RX ORDER — PHENOL 1.4 %
1 AEROSOL, SPRAY (ML) MUCOUS MEMBRANE EVERY 2 HOUR PRN
Qty: 20 ML | Refills: 0 | Status: SHIPPED | OUTPATIENT
Start: 2024-02-05 | End: 2024-02-10

## 2024-02-05 RX ORDER — NIRMATRELVIR AND RITONAVIR 300-100 MG
3 KIT ORAL 2 TIMES DAILY
Qty: 30 TABLET | Refills: 0 | Status: SHIPPED | OUTPATIENT
Start: 2024-02-05 | End: 2024-02-10

## 2024-02-05 RX ORDER — GUAIFENESIN 600 MG/1
1200 TABLET, EXTENDED RELEASE ORAL EVERY 12 HOURS SCHEDULED
Qty: 20 TABLET | Refills: 0 | Status: SHIPPED | OUTPATIENT
Start: 2024-02-05 | End: 2024-02-10

## 2024-02-05 RX ORDER — FLUTICASONE PROPIONATE 50 MCG
1 SPRAY, SUSPENSION (ML) NASAL DAILY
Qty: 9.9 ML | Refills: 0 | Status: SHIPPED | OUTPATIENT
Start: 2024-02-05 | End: 2024-02-12

## 2024-02-05 NOTE — ASSESSMENT & PLAN NOTE
Nonproductive cough, rhinorrhea, sore throat onset 4 days ago   Sick contact at home   Did not test for Covid at home   Received Covid and flu vaccines  Covid/Flu- in office: +    Plan-   Paxlovid Therapy   Conservative management- Chloraseptic spray, tylenol, vaporized steam, Mucinex, Flonase   Hold Lipitor  Educated Covid + restrictions of isolation for 5 days and wear mask for 10 days   Advised increased bleeding risk Eliquis, if any bleeding occurs contact us immediately

## 2024-02-05 NOTE — PROGRESS NOTES
Name: Heath Schuster      : 1955      MRN: [id number]  Encounter Provider: Niesha Peoples MD  Encounter Date: 2024   Encounter department: [facility] INTERNAL MEDICINE [place]    Assessment & Plan     1. Symptoms of upper respiratory infection (URI)  Assessment & Plan:  Nonproductive cough, rhinorrhea, sore throat onset 4 days ago   Sick contact at home   Did not test for Covid at home   Received Covid and flu vaccines  Covid/Flu- in office: +    Plan-   Paxlovid Therapy   Conservative management- Chloraseptic spray, tylenol, vaporized steam, Mucinex, Flonase   Hold Lipitor  Educated Covid + restrictions of isolation for 5 days and wear mask for 10 days   Advised increased bleeding risk Eliquis, if any bleeding occurs contact us immediately     Orders:  -     phenol (Chloraseptic) 1.4 % mucosal liquid; Apply 1 spray to the mouth or throat every 2 (two) hours as needed (sore throat) for up to 5 days  -     guaiFENesin (MUCINEX) 600 mg 12 hr tablet; Take 2 tablets (1,200 mg total) by mouth every 12 (twelve) hours for 5 days  -     fluticasone (FLONASE) 50 mcg/act nasal spray; 1 spray into each nostril daily for 7 days    2. COVID  -     nirmatrelvir & ritonavir (Paxlovid, 300/100,) tablet therapy pack; Take 3 tablets by mouth 2 (two) times a day for 5 days Take 2 nirmatrelvir tablets + 1 ritonavir tablet together per dose           Subjective      Mr. Schuster is a 67 yo M with a PMH of Afib, T2DM, HLD who presents to the office for a sick visit. Patient is complaining of non productive cough, runny nose, and sore throat onset 4 days prior to arrival. He also states that his abdomen is sore from all of the coughing. Patient notes that his significant other was also sick in the house. He endorses drinking teas without any relief. Patient denies fevers, chills, HA, ear pain, sinus pain, inability to tolerate po, CP, SOB, abdominal pain, NVD, urinary sx, confusion, or any other sx at this time.        Review of Systems   Constitutional:  Negative for appetite change, chills, fatigue and fever.   HENT:  Positive for congestion and sore throat. Negative for ear pain, tinnitus, trouble swallowing and voice change.    Eyes:  Negative for pain, discharge, redness, itching and visual disturbance.   Respiratory:  Positive for cough. Negative for choking, shortness of breath, wheezing and stridor.    Cardiovascular:  Negative for chest pain and palpitations.   Gastrointestinal:  Negative for abdominal pain, constipation, diarrhea, nausea and vomiting.        Abdominal wall soreness    Genitourinary:  Negative for dysuria, hematuria and urgency.   Musculoskeletal:  Negative for arthralgias, back pain and myalgias.   Skin:  Negative for color change and rash.   Allergic/Immunologic: Negative for immunocompromised state.   Neurological:  Negative for dizziness, seizures, syncope, weakness and headaches.   Psychiatric/Behavioral:  Negative for agitation and confusion.    All other systems reviewed and are negative.      Current Outpatient Medications on File Prior to Visit   Medication Sig    apixaban (Eliquis) 5 mg Take 1 tablet (5 mg total) by mouth 2 (two) times a day    atorvastatin (LIPITOR) 20 mg tablet Take 20 mg by mouth daily    lisinopril (ZESTRIL) 5 mg tablet TAKE ONE TABLET BY MOUTH EVERY DAY FOR BLOOD PRESSURE/HEART    metFORMIN (GLUCOPHAGE) 500 mg tablet Take 1,000 mg by mouth    metoprolol tartrate (LOPRESSOR) 25 mg tablet Take 0.5 tablets by mouth 2 (two) times a day    Multiple Vitamins-Minerals (CENTRUM ADULTS PO) Take 50 mg by mouth    omeprazole (PriLOSEC) 20 mg delayed release capsule     tadalafil (CIALIS) 20 MG tablet 20 mg    [DISCONTINUED] Omeprazole 20 MG TBEC Take by mouth    [DISCONTINUED] tadalafil (CIALIS) 10 MG tablet Take 10 mg by mouth daily as needed for erectile dysfunction    [DISCONTINUED] triamcinolone (KENALOG) 0.1 % ointment APPLY SMALL AMOUNT TOPICALLY TWICE A DAY FOR SKIN  CONDITION       Objective     /70 (BP Location: Left arm, Patient Position: Sitting, Cuff Size: Large)   Pulse 96   Temp 99.1 °F (37.3 °C) (Tympanic)   Resp 16   Wt 115 kg (254 lb)   SpO2 98%   BMI 30.12 kg/m²     Physical Exam  Constitutional:       General: He is not in acute distress.     Appearance: Normal appearance. He is not ill-appearing, toxic-appearing or diaphoretic.   HENT:      Head: Normocephalic and atraumatic.      Right Ear: Tympanic membrane, ear canal and external ear normal.      Left Ear: Tympanic membrane, ear canal and external ear normal.      Nose: No congestion.      Comments: Mildly Erythematous turbinates     Mouth/Throat:      Lips: Pink.      Mouth: Mucous membranes are moist. No injury.      Tongue: No lesions.      Pharynx: Oropharynx is clear. Uvula midline. No pharyngeal swelling, oropharyngeal exudate, posterior oropharyngeal erythema or uvula swelling.      Tonsils: No tonsillar exudate.   Eyes:      General: No scleral icterus.        Right eye: No discharge.         Left eye: No discharge.      Extraocular Movements: Extraocular movements intact.      Conjunctiva/sclera: Conjunctivae normal.      Pupils: Pupils are equal, round, and reactive to light.   Cardiovascular:      Rate and Rhythm: Normal rate. Rhythm irregular.      Pulses: Normal pulses.      Heart sounds: Normal heart sounds. No murmur heard.     No friction rub. No gallop.   Pulmonary:      Effort: Pulmonary effort is normal. No respiratory distress.      Breath sounds: Normal breath sounds. No wheezing or rhonchi.   Chest:      Chest wall: No tenderness.   Abdominal:      General: Bowel sounds are normal. There is no distension.      Palpations: Abdomen is soft. There is no mass.      Tenderness: There is no abdominal tenderness. There is no guarding or rebound.   Musculoskeletal:         General: Normal range of motion.      Cervical back: Normal range of motion and neck supple. No rigidity or  tenderness.      Right lower leg: No edema.      Left lower leg: No edema.   Skin:     General: Skin is warm.   Neurological:      General: No focal deficit present.      Mental Status: He is alert and oriented to person, place, and time. Mental status is at baseline.      Cranial Nerves: No cranial nerve deficit.      Sensory: No sensory deficit.      Motor: No weakness.      Coordination: Coordination normal.      Gait: Gait normal.   Psychiatric:         Mood and Affect: Mood normal.         Behavior: Behavior normal.         Thought Content: Thought content normal.       Niesha Peoples MD

## 2024-02-08 ENCOUNTER — TELEPHONE (OUTPATIENT)
Dept: UROLOGY | Facility: CLINIC | Age: 69
End: 2024-02-08

## 2024-02-08 NOTE — TELEPHONE ENCOUNTER
"----- Message from Sarah Montilla PA-C sent at 2/8/2024 11:32 AM EST -----  Regarding: FW: ED  Contact: [phone]  Please assist with scheduling IPP discussion appointment with MD. Thanks   ----- Message -----  From: Marianela Benites MA  Sent: 2/8/2024  10:48 AM EST  To: [facility] Provider  Subject: FW: ED                                             ----- Message -----  From: Heath Schuster \"Ray\"  Sent: 2/8/2024  10:47 AM EST  To: [facility]  Subject: ED                                               GM Dr Pugh, I'd like to look into & get more information about getting a Penile Implant    TY Ray      "

## 2024-04-02 ENCOUNTER — HOSPITAL ENCOUNTER (OUTPATIENT)
Dept: RADIOLOGY | Facility: HOSPITAL | Age: 69
Discharge: HOME | End: 2024-04-02
Payer: MEDICARE

## 2024-04-02 ENCOUNTER — OFFICE VISIT (OUTPATIENT)
Dept: INTERNAL MEDICINE CLINIC | Facility: CLINIC | Age: 69
End: 2024-04-02
Payer: MEDICARE

## 2024-04-02 VITALS
WEIGHT: 258 LBS | OXYGEN SATURATION: 98 % | BODY MASS INDEX: 30.46 KG/M2 | HEIGHT: 77 IN | RESPIRATION RATE: 16 BRPM | TEMPERATURE: 98.4 F | DIASTOLIC BLOOD PRESSURE: 74 MMHG | SYSTOLIC BLOOD PRESSURE: 118 MMHG | HEART RATE: 80 BPM

## 2024-04-02 DIAGNOSIS — R05.3 PERSISTENT COUGH: ICD-10-CM

## 2024-04-02 DIAGNOSIS — U07.1 COVID-19: ICD-10-CM

## 2024-04-02 DIAGNOSIS — R05.3 PERSISTENT COUGH: Primary | ICD-10-CM

## 2024-04-02 PROBLEM — U09.9 LONG COVID: Status: ACTIVE | Noted: 2024-04-02

## 2024-04-02 PROBLEM — U09.9 LONG COVID: Status: RESOLVED | Noted: 2024-04-02 | Resolved: 2024-04-02

## 2024-04-02 PROCEDURE — 99214 OFFICE O/P EST MOD 30 MIN: CPT

## 2024-04-02 PROCEDURE — G2211 COMPLEX E/M VISIT ADD ON: HCPCS

## 2024-04-02 PROCEDURE — 71046 X-RAY EXAM CHEST 2 VIEWS: CPT

## 2024-04-02 RX ORDER — BENZONATATE 100 MG/1
100 CAPSULE ORAL 3 TIMES DAILY PRN
Qty: 20 CAPSULE | Refills: 0 | Status: SHIPPED | OUTPATIENT
Start: 2024-04-02

## 2024-04-02 RX ORDER — FAMOTIDINE 20 MG/1
20 TABLET, FILM COATED ORAL 2 TIMES DAILY
Qty: 28 TABLET | Refills: 0 | Status: SHIPPED | OUTPATIENT
Start: 2024-04-02 | End: 2024-04-16

## 2024-04-02 RX ORDER — DEXTROMETHORPHAN HYDROBROMIDE AND PROMETHAZINE HYDROCHLORIDE 15; 6.25 MG/5ML; MG/5ML
5 SYRUP ORAL 4 TIMES DAILY PRN
Qty: 118 ML | Refills: 0 | Status: SHIPPED | OUTPATIENT
Start: 2024-04-02 | End: 2024-04-02

## 2024-04-02 RX ORDER — FAMOTIDINE 20 MG/1
20 TABLET, FILM COATED ORAL 2 TIMES DAILY
Qty: 28 TABLET | Refills: 0 | Status: SHIPPED | OUTPATIENT
Start: 2024-04-02 | End: 2024-04-02

## 2024-04-02 RX ORDER — DEXTROMETHORPHAN HYDROBROMIDE AND PROMETHAZINE HYDROCHLORIDE 15; 6.25 MG/5ML; MG/5ML
5 SYRUP ORAL 4 TIMES DAILY PRN
Qty: 118 ML | Refills: 0 | Status: SHIPPED | OUTPATIENT
Start: 2024-04-02

## 2024-04-02 RX ORDER — BENZONATATE 100 MG/1
100 CAPSULE ORAL 3 TIMES DAILY PRN
Qty: 20 CAPSULE | Refills: 0 | Status: SHIPPED | OUTPATIENT
Start: 2024-04-02 | End: 2024-04-02

## 2024-04-02 NOTE — PROGRESS NOTES
INTERNAL MEDICINE FOLLOW-UP OFFICE VISIT  [facility] - [facility] INTERNAL MEDICINE [place]    NAME: Heath Schuster  AGE: 68 y.o. SEX: male  : 1955     DATE: 2024     Assessment and Plan:     1. Persistent cough  Assessment & Plan:  Tested positive for COVID-19 in the office on  with URI symptoms of non-productive cough, rhinorrhea, and sore throat.  Completed therapy with Paxlovid and endorses resolution of cough for approximately three weeks, but now endorses intermittent dry cough has returned over the past two weeks.  He denies any recent sick contacts, fever, chills, rhinorrhea, post nasal drip, and sore throat.  He does state that the cough is worse when lying flat at night, which suggests possibility of GERD contribution. Patient continues to take omeprazole daily.  Differentials include long COVID vs GERD induced cough vs other viral etiologies including RSV    Plan:  Will trial famotidine 20 mg twice daily x 14 days and monitor for resolution of cough if this is related to GERD.  Obtain CXR to rule out possibility of pneumonia  Tessalon Perles four times daily as needed  Promethazine-dextromethorphan cough syrup four times daily as needed  We advised the patient to return to our office in two weeks if his cough persists despite taking both Pepcid and PPI.  Informed the patient that if his cough does persistent despite these interventions, further investigation with pulmonary function testing may be warranted.      Orders:  -     promethazine-dextromethorphan (PHENERGAN-DM) 6.25-15 mg/5 mL oral syrup; Take 5 mL by mouth 4 (four) times a day as needed for cough  -     benzonatate (TESSALON PERLES) 100 mg capsule; Take 1 capsule (100 mg total) by mouth 3 (three) times a day as needed for cough  -     XR chest pa & lateral; Future; Expected date: 2024  -     famotidine (PEPCID) 20 mg tablet; Take 1 tablet (20 mg total) by mouth 2 (two) times a day for 14 days    2.  COVID-19  Assessment & Plan:  Patient had tested positive for COVID-19 on 2/5 and completed therapy with Paxlovid.  Suspect his cough may be secondary to long COVID, however other etiologies including RSV or GERD cannot be definitively ruled out at this time.  He personally denies any smoking history   Continue care plan as documented for persistent cough          Return in about 2 weeks (around 4/16/2024) for Recheck.     Chief Complaint:     Chief Complaint   Patient presents with    Cold Like Symptoms     Cough for over 2 weeks, some phlegm        History of Present Illness:     Ramakrishna Schuster is a 68 year old male with past medical history of atrial fibrillation, T2DM, and HLD who presents to the office due to primary complaint of persistent cough. He was recently seen in our office on 2/5 with report of upper respiratory symptoms of non productive cough, rhinorrhea, and sore throat. He denies any recent fever, chills, shortness of breath, and chest pain. He denies any recent sick contacts. He did receive the COVID-19 and influenza vaccinations. He incidentally tested positive for COVID-19 in the office and was prescribed Paxlovid in addition to chloraseptic spray, Mucinex, and Flonase. He states his cough had resolved for three weeks and then returned two weeks ago and has been persistent and louder than usual. The cough over the past two weeks has been primarily dry but intermittently productive. He states it is worse when lying flat at night and has tried using extra pillows at the head of his bed at night. He denies any smoking history as both of his parents were smokers. He does have a history of YEHUDA and continues to use CPAP at night, however he has been unable to tolerate wearing it at night at times due to severity of his cough.    The following portions of the patient's history were reviewed and updated as appropriate: allergies, current medications, past family history, past medical history, past  "social history, past surgical history and problem list.     Review of Systems:     Review of Systems   Constitutional:  Negative for appetite change, chills, fatigue and fever.   HENT:  Negative for congestion, postnasal drip, rhinorrhea and sore throat.    Eyes:  Negative for discharge and visual disturbance.   Respiratory:  Positive for cough. Negative for shortness of breath and wheezing.    Cardiovascular:  Negative for chest pain and palpitations.   Gastrointestinal:  Negative for abdominal pain, nausea and vomiting.   Genitourinary:  Negative for dysuria and hematuria.   Musculoskeletal:  Negative for back pain and myalgias.   Skin:  Negative for color change and rash.   Neurological:  Negative for weakness and headaches.   Psychiatric/Behavioral:  Negative for agitation and confusion.         Problem List:     Patient Active Problem List   Diagnosis    Encounter for colonoscopy due to history of colonic polyp    Type 2 diabetes mellitus without complication, without long-term current use of insulin (HCC)    Elbow pain, chronic, right    Persistent atrial fibrillation (HCC)    Essential hypertension    Mixed dyslipidemia    Ascending aorta enlargement (HCC)    COVID-19    HSV-1 (herpes simplex virus 1) infection    Gout    Erectile dysfunction    History of appendectomy    Skin tag    Paroxysmal atrial fibrillation (HCC)    YEHUDA (obstructive sleep apnea)    Shoulder pain, right    Asymptomatic varicose veins of right lower extremity    Callus between toes    Class 1 obesity due to excess calories with serious comorbidity and body mass index (BMI) of 30.0 to 30.9 in adult    Hyperlipidemia    Persistent cough        Objective:     /74 (BP Location: Left arm, Patient Position: Sitting, Cuff Size: Large)   Pulse 80   Temp 98.4 °F (36.9 °C) (Tympanic)   Resp 16   Ht 6' 5\" (1.956 m)   Wt 117 kg (258 lb)   SpO2 98%   BMI 30.59 kg/m²     Physical Exam  Vitals and nursing note reviewed.   Constitutional:  "      General: He is not in acute distress.     Appearance: Normal appearance. He is well-developed. He is not ill-appearing.   HENT:      Head: Normocephalic and atraumatic.      Right Ear: External ear normal.      Left Ear: External ear normal.      Nose: Nose normal.      Mouth/Throat:      Mouth: Mucous membranes are moist.   Eyes:      Extraocular Movements: Extraocular movements intact.      Conjunctiva/sclera: Conjunctivae normal.   Cardiovascular:      Rate and Rhythm: Normal rate and regular rhythm.      Heart sounds: Normal heart sounds, S1 normal and S2 normal. No murmur heard.  Pulmonary:      Effort: Pulmonary effort is normal. No respiratory distress.      Breath sounds: Normal breath sounds. No decreased breath sounds, wheezing, rhonchi or rales.   Abdominal:      General: Abdomen is flat. Bowel sounds are normal. There is no distension.      Palpations: Abdomen is soft.      Tenderness: There is no abdominal tenderness.   Musculoskeletal:         General: No swelling.      Cervical back: Neck supple.      Right lower leg: No edema.      Left lower leg: No edema.   Skin:     General: Skin is warm and dry.      Capillary Refill: Capillary refill takes less than 2 seconds.   Neurological:      General: No focal deficit present.      Mental Status: He is alert and oriented to person, place, and time.      Motor: No weakness.   Psychiatric:         Attention and Perception: Attention normal.         Mood and Affect: Mood normal.         Behavior: Behavior normal. Behavior is cooperative.         Pertinent Laboratory/Diagnostic Studies:    Laboratory Results: I have personally reviewed the pertinent laboratory results/reports     Radiology/Other Diagnostic Testing Results: I have personally reviewed pertinent reports.      Christopher Quinteros DO  [facility] INTERNAL MEDICINE [place]

## 2024-04-02 NOTE — ASSESSMENT & PLAN NOTE
Tested positive for COVID-19 in the office on 2/5 with URI symptoms of non-productive cough, rhinorrhea, and sore throat.  Completed therapy with Paxlovid and endorses resolution of cough for approximately three weeks, but now endorses intermittent dry cough has returned over the past two weeks.  He denies any recent sick contacts, fever, chills, rhinorrhea, post nasal drip, and sore throat.  He does state that the cough is worse when lying flat at night, which suggests possibility of GERD contribution. Patient continues to take omeprazole daily.  Differentials include long COVID vs GERD induced cough vs other viral etiologies including RSV    Plan:  Will trial famotidine 20 mg twice daily x 14 days and monitor for resolution of cough if this is related to GERD.  Obtain CXR to rule out possibility of pneumonia  Tessalon Perles four times daily as needed  Promethazine-dextromethorphan cough syrup four times daily as needed  We advised the patient to return to our office in two weeks if his cough persists despite taking both Pepcid and PPI.  Informed the patient that if his cough does persistent despite these interventions, further investigation with pulmonary function testing may be warranted.

## 2024-04-02 NOTE — ASSESSMENT & PLAN NOTE
Patient had tested positive for COVID-19 on 2/5 and completed therapy with Paxlovid.  Suspect his cough may be secondary to long COVID, however other etiologies including RSV or GERD cannot be definitively ruled out at this time.  He personally denies any smoking history   Continue care plan as documented for persistent cough

## 2024-05-03 ENCOUNTER — OFFICE VISIT (OUTPATIENT)
Dept: ENDOCRINOLOGY | Facility: CLINIC | Age: 69
End: 2024-05-03
Payer: MEDICARE

## 2024-05-03 VITALS
DIASTOLIC BLOOD PRESSURE: 80 MMHG | HEART RATE: 70 BPM | WEIGHT: 243.8 LBS | BODY MASS INDEX: 28.79 KG/M2 | SYSTOLIC BLOOD PRESSURE: 126 MMHG | HEIGHT: 77 IN

## 2024-05-03 DIAGNOSIS — E11.9 TYPE 2 DIABETES MELLITUS WITHOUT COMPLICATION, WITHOUT LONG-TERM CURRENT USE OF INSULIN (HCC): Primary | ICD-10-CM

## 2024-05-03 DIAGNOSIS — E78.2 MIXED DYSLIPIDEMIA: ICD-10-CM

## 2024-05-03 DIAGNOSIS — E66.09 CLASS 1 OBESITY DUE TO EXCESS CALORIES WITH SERIOUS COMORBIDITY AND BODY MASS INDEX (BMI) OF 30.0 TO 30.9 IN ADULT: ICD-10-CM

## 2024-05-03 DIAGNOSIS — I10 ESSENTIAL HYPERTENSION: ICD-10-CM

## 2024-05-03 PROCEDURE — 99214 OFFICE O/P EST MOD 30 MIN: CPT | Performed by: INTERNAL MEDICINE

## 2024-05-03 NOTE — PROGRESS NOTES
Heath Stevensey 68 y.o. male MRN: [id number]    Encounter: [id number]      Assessment/Plan     Problem List Items Addressed This Visit          Cardiovascular and Mediastinum    Essential hypertension     BP at goal, continue current meds             Endocrine    Type 2 diabetes mellitus without complication, without long-term current use of insulin (HCC) - Primary       Lab Results   Component Value Date    HGBA1C 6.2 03/10/2023   Diabetes is well controlled , continue metformin , dietary and lifestyle modifications and weight loss   He will f/u with PCP and I plan to see him back on as needed basis             Other    Class 1 obesity due to excess calories with serious comorbidity and body mass index (BMI) of 30.0 to 30.9 in adult     Lost 15-20 lbs , encouraged to continue dietary and lifestyle modifications and weight loss          Mixed dyslipidemia     Continue statins              CC: Diabetes    History of Present Illness     HPI:  67 y/o male with type 2 DM seen in follow up -  Current regimen   Metformin 500 mg BID with meals   Checks f.s fasting every other day 110-130s    No polyuria , polydypsia , no blurry vision   No numbness and tingling in feet     Lost about 15-20 lbs since last visit      Review of Systems    Historical Information   Past Medical History:   Diagnosis Date    Appendicitis     Atrial fibrillation (HCC)     Colon polyp     Diabetes mellitus (HCC)     Hyperlipidemia     Hypertension      Past Surgical History:   Procedure Laterality Date    APPENDECTOMY      CHOLECYSTECTOMY      COLONOSCOPY       Social History   Social History     Substance and Sexual Activity   Alcohol Use Not Currently     Social History     Substance and Sexual Activity   Drug Use Never     Social History     Tobacco Use   Smoking Status Never    Passive exposure: Never   Smokeless Tobacco Never     Family History:   Family History   Problem Relation Age of Onset    Throat cancer Father     Prostate cancer  "Brother     Alcohol abuse Brother        Meds/Allergies   Current Outpatient Medications   Medication Sig Dispense Refill    apixaban (Eliquis) 5 mg Take 1 tablet (5 mg total) by mouth 2 (two) times a day 60 tablet 2    atorvastatin (LIPITOR) 20 mg tablet Take 20 mg by mouth daily      lisinopril (ZESTRIL) 5 mg tablet TAKE ONE TABLET BY MOUTH EVERY DAY FOR BLOOD PRESSURE/HEART      metFORMIN (GLUCOPHAGE) 500 mg tablet Take 1,000 mg by mouth      metoprolol tartrate (LOPRESSOR) 25 mg tablet Take 0.5 tablets by mouth 2 (two) times a day      Multiple Vitamins-Minerals (CENTRUM ADULTS PO) Take 50 mg by mouth      omeprazole (PriLOSEC) 20 mg delayed release capsule       tadalafil (CIALIS) 20 MG tablet 20 mg      benzonatate (TESSALON PERLES) 100 mg capsule Take 1 capsule (100 mg total) by mouth 3 (three) times a day as needed for cough 20 capsule 0    famotidine (PEPCID) 20 mg tablet Take 1 tablet (20 mg total) by mouth 2 (two) times a day for 14 days 28 tablet 0    fluticasone (FLONASE) 50 mcg/act nasal spray 1 spray into each nostril daily for 7 days 9.9 mL 0    promethazine-dextromethorphan (PHENERGAN-DM) 6.25-15 mg/5 mL oral syrup Take 5 mL by mouth 4 (four) times a day as needed for cough 118 mL 0     No current facility-administered medications for this visit.     Allergies   Allergen Reactions    Other Shortness Of Breath     Cats and dogs       Objective   Vitals: Blood pressure 126/80, pulse 70, height 6' 5\" (1.956 m), weight 111 kg (243 lb 12.8 oz).    Physical Exam  Vitals reviewed.   Constitutional:       General: He is not in acute distress.     Appearance: Normal appearance. He is not ill-appearing, toxic-appearing or diaphoretic.   HENT:      Head: Normocephalic and atraumatic.   Eyes:      General: No scleral icterus.     Extraocular Movements: Extraocular movements intact.   Cardiovascular:      Rate and Rhythm: Normal rate and regular rhythm.      Heart sounds: Normal heart sounds. No murmur " "heard.  Pulmonary:      Effort: Pulmonary effort is normal. No respiratory distress.      Breath sounds: Normal breath sounds. No wheezing or rales.   Musculoskeletal:      Cervical back: Neck supple.      Right lower leg: No edema.      Left lower leg: No edema.   Lymphadenopathy:      Cervical: No cervical adenopathy.   Skin:     General: Skin is warm and dry.   Neurological:      General: No focal deficit present.      Mental Status: He is alert and oriented to person, place, and time.      Gait: Gait normal.   Psychiatric:         Mood and Affect: Mood normal.         Behavior: Behavior normal.         Thought Content: Thought content normal.         Judgment: Judgment normal.         The history was obtained from the review of the chart, patient.    Lab Results:   Lab Results   Component Value Date/Time    HDL, Direct 46 11/03/2023 06:36 AM    Triglycerides 124 11/03/2023 06:36 AM     March 2024   A1C 6.7      Imaging Studies:     Portions of the record may have been created with voice recognition software. Occasional wrong word or \"sound a like\" substitutions may have occurred due to the inherent limitations of voice recognition software. Read the chart carefully and recognize, using context, where substitutions have occurred.    "

## 2024-05-03 NOTE — LETTER
May 5, 2024     Romaine Aguilar MD  [address] [facility] [id number]    Patient: Heath Schuster   YOB: 1955   Date of Visit: 5/3/2024       Dear Dr. Aguilar:    Thank you for referring Heath Schuster to me for evaluation. Below are my notes for this consultation.    If you have questions, please do not hesitate to call me. I look forward to following your patient along with you.         Sincerely,        Kayce Donald MD        CC: No Recipients    Kayce Donald MD  5/5/2024  6:06 PM  Signed   Heath Schuster 68 y.o. male MRN: [id number]    Encounter: [id number]      Assessment/Plan    Problem List Items Addressed This Visit          Cardiovascular and Mediastinum    Essential hypertension     BP at goal, continue current meds             Endocrine    Type 2 diabetes mellitus without complication, without long-term current use of insulin (HCC) - Primary       Lab Results   Component Value Date    HGBA1C 6.2 03/10/2023   Diabetes is well controlled , continue metformin , dietary and lifestyle modifications and weight loss   He will f/u with PCP and I plan to see him back on as needed basis             Other    Class 1 obesity due to excess calories with serious comorbidity and body mass index (BMI) of 30.0 to 30.9 in adult     Lost 15-20 lbs , encouraged to continue dietary and lifestyle modifications and weight loss          Mixed dyslipidemia     Continue statins              CC: Diabetes    History of Present Illness    HPI:  69 y/o male with type 2 DM seen in follow up -  Current regimen   Metformin 500 mg BID with meals   Checks f.s fasting every other day 110-130s    No polyuria , polydypsia , no blurry vision   No numbness and tingling in feet     Lost about 15-20 lbs since last visit      Review of Systems    Historical Information  Past Medical History:   Diagnosis Date   • Appendicitis    • Atrial fibrillation (HCC)    • Colon polyp    • Diabetes mellitus (HCC)    • Hyperlipidemia    •  Hypertension      Past Surgical History:   Procedure Laterality Date   • APPENDECTOMY     • CHOLECYSTECTOMY     • COLONOSCOPY       Social History  Social History     Substance and Sexual Activity   Alcohol Use Not Currently     Social History     Substance and Sexual Activity   Drug Use Never     Social History     Tobacco Use   Smoking Status Never   • Passive exposure: Never   Smokeless Tobacco Never     Family History:   Family History   Problem Relation Age of Onset   • Throat cancer Father    • Prostate cancer Brother    • Alcohol abuse Brother        Meds/Allergies  Current Outpatient Medications   Medication Sig Dispense Refill   • apixaban (Eliquis) 5 mg Take 1 tablet (5 mg total) by mouth 2 (two) times a day 60 tablet 2   • atorvastatin (LIPITOR) 20 mg tablet Take 20 mg by mouth daily     • lisinopril (ZESTRIL) 5 mg tablet TAKE ONE TABLET BY MOUTH EVERY DAY FOR BLOOD PRESSURE/HEART     • metFORMIN (GLUCOPHAGE) 500 mg tablet Take 1,000 mg by mouth     • metoprolol tartrate (LOPRESSOR) 25 mg tablet Take 0.5 tablets by mouth 2 (two) times a day     • Multiple Vitamins-Minerals (CENTRUM ADULTS PO) Take 50 mg by mouth     • omeprazole (PriLOSEC) 20 mg delayed release capsule      • tadalafil (CIALIS) 20 MG tablet 20 mg     • benzonatate (TESSALON PERLES) 100 mg capsule Take 1 capsule (100 mg total) by mouth 3 (three) times a day as needed for cough 20 capsule 0   • famotidine (PEPCID) 20 mg tablet Take 1 tablet (20 mg total) by mouth 2 (two) times a day for 14 days 28 tablet 0   • fluticasone (FLONASE) 50 mcg/act nasal spray 1 spray into each nostril daily for 7 days 9.9 mL 0   • promethazine-dextromethorphan (PHENERGAN-DM) 6.25-15 mg/5 mL oral syrup Take 5 mL by mouth 4 (four) times a day as needed for cough 118 mL 0     No current facility-administered medications for this visit.     Allergies   Allergen Reactions   • Other Shortness Of Breath     Cats and dogs       Objective  Vitals: Blood pressure 126/80,  "pulse 70, height 6' 5\" (1.956 m), weight 111 kg (243 lb 12.8 oz).    Physical Exam  Vitals reviewed.   Constitutional:       General: He is not in acute distress.     Appearance: Normal appearance. He is not ill-appearing, toxic-appearing or diaphoretic.   HENT:      Head: Normocephalic and atraumatic.   Eyes:      General: No scleral icterus.     Extraocular Movements: Extraocular movements intact.   Cardiovascular:      Rate and Rhythm: Normal rate and regular rhythm.      Heart sounds: Normal heart sounds. No murmur heard.  Pulmonary:      Effort: Pulmonary effort is normal. No respiratory distress.      Breath sounds: Normal breath sounds. No wheezing or rales.   Musculoskeletal:      Cervical back: Neck supple.      Right lower leg: No edema.      Left lower leg: No edema.   Lymphadenopathy:      Cervical: No cervical adenopathy.   Skin:     General: Skin is warm and dry.   Neurological:      General: No focal deficit present.      Mental Status: He is alert and oriented to person, place, and time.      Gait: Gait normal.   Psychiatric:         Mood and Affect: Mood normal.         Behavior: Behavior normal.         Thought Content: Thought content normal.         Judgment: Judgment normal.         The history was obtained from the review of the chart, patient.    Lab Results:   Lab Results   Component Value Date/Time    HDL, Direct 46 11/03/2023 06:36 AM    Triglycerides 124 11/03/2023 06:36 AM     March 2024   A1C 6.7      Imaging Studies:     Portions of the record may have been created with voice recognition software. Occasional wrong word or \"sound a like\" substitutions may have occurred due to the inherent limitations of voice recognition software. Read the chart carefully and recognize, using context, where substitutions have occurred.    "

## 2024-05-05 NOTE — ASSESSMENT & PLAN NOTE
Lab Results   Component Value Date    HGBA1C 6.2 03/10/2023   Diabetes is well controlled , continue metformin , dietary and lifestyle modifications and weight loss   He will f/u with PCP and I plan to see him back on as needed basis

## 2024-05-07 ENCOUNTER — OFFICE VISIT (OUTPATIENT)
Dept: INTERNAL MEDICINE CLINIC | Facility: CLINIC | Age: 69
End: 2024-05-07
Payer: MEDICARE

## 2024-05-07 VITALS
OXYGEN SATURATION: 97 % | HEART RATE: 73 BPM | BODY MASS INDEX: 29.28 KG/M2 | SYSTOLIC BLOOD PRESSURE: 120 MMHG | RESPIRATION RATE: 16 BRPM | TEMPERATURE: 98.3 F | DIASTOLIC BLOOD PRESSURE: 76 MMHG | HEIGHT: 77 IN | WEIGHT: 248 LBS

## 2024-05-07 DIAGNOSIS — M79.645 PAIN OF LEFT THUMB: ICD-10-CM

## 2024-05-07 DIAGNOSIS — E11.9 TYPE 2 DIABETES MELLITUS WITHOUT COMPLICATION, WITHOUT LONG-TERM CURRENT USE OF INSULIN (HCC): ICD-10-CM

## 2024-05-07 DIAGNOSIS — E78.2 MIXED DYSLIPIDEMIA: ICD-10-CM

## 2024-05-07 DIAGNOSIS — Z00.00 MEDICARE ANNUAL WELLNESS VISIT, SUBSEQUENT: Primary | ICD-10-CM

## 2024-05-07 DIAGNOSIS — N52.9 ERECTILE DYSFUNCTION, UNSPECIFIED ERECTILE DYSFUNCTION TYPE: ICD-10-CM

## 2024-05-07 DIAGNOSIS — I77.89 ASCENDING AORTA ENLARGEMENT (HCC): ICD-10-CM

## 2024-05-07 PROBLEM — M79.646 THUMB PAIN: Status: ACTIVE | Noted: 2024-05-07

## 2024-05-07 PROCEDURE — G0439 PPPS, SUBSEQ VISIT: HCPCS

## 2024-05-07 PROCEDURE — 99213 OFFICE O/P EST LOW 20 MIN: CPT

## 2024-05-07 RX ORDER — TADALAFIL 20 MG/1
20 TABLET ORAL DAILY PRN
Qty: 10 TABLET | Refills: 0 | Status: SHIPPED | OUTPATIENT
Start: 2024-05-07

## 2024-05-07 NOTE — PROGRESS NOTES
Assessment and Plan:     Problem List Items Addressed This Visit          Cardiovascular and Mediastinum    Ascending aorta enlargement (HCC)    Relevant Orders    CT chest wo contrast       Endocrine    Type 2 diabetes mellitus without complication, without long-term current use of insulin (HCC)    Relevant Orders    Comprehensive metabolic panel    Albumin / creatinine urine ratio       Surgery/Wound/Pain    Thumb pain     Complains of mild pain at proximal thumb joint x3 months  Not interfering with daily life, denies numbness or weakness in wrist hand or fingers  Physical exam benign other than some mild tenderness when squeezing joint, no effusion/erythema present  Patient uninterested in further work up at this visit  Recommend tylenol prn and observation  Can pursue imaging if symptoms are persistent            Other    Mixed dyslipidemia    Relevant Orders    Lipid Panel with Direct LDL reflex    Erectile dysfunction    Relevant Medications    tadalafil (CIALIS) 20 MG tablet     Other Visit Diagnoses       Welcome to Medicare preventive visit    -  Primary    Medicare annual wellness visit, subsequent        Relevant Orders    PSA Total (Reflex To Free)             Preventive health issues were discussed with patient, and age appropriate screening tests were ordered as noted in patient's After Visit Summary.  Personalized health advice and appropriate referrals for health education or preventive services given if needed, as noted in patient's After Visit Summary.     History of Present Illness:     Patient presents for a Medicare Wellness Visits    Patient doing well today. He presents stating he has had some mild pain in his left thumb for 3 months.  Pain is not exacerbated by any particular activity and he does denies numbness or weakness in the wrist, hand or fingers.  He is not particularly interested in investigating it further at this visit.  He additionally is requesting refills of his cialis.   Patient has been tracking his sugars regularly and walks frequently.  He has no other complaints or concerns at this visit.          Patient Care Team:  Romaine Aguilar MD as PCP - General (Internal Medicine)  Arianna Chavez as Diabetes Educator (Diabetes Services)     Review of Systems:     Review of Systems   Constitutional:  Negative for chills and fever.   HENT:  Negative for ear pain and sore throat.    Eyes:  Negative for pain and visual disturbance.   Respiratory:  Negative for cough and shortness of breath.    Cardiovascular:  Negative for chest pain and palpitations.   Gastrointestinal:  Negative for abdominal pain and vomiting.   Genitourinary:  Negative for dysuria and hematuria.   Musculoskeletal:  Negative for arthralgias and back pain.        Left thumb pain   Skin:  Negative for color change and rash.   Neurological:  Negative for seizures and syncope.   All other systems reviewed and are negative.       Problem List:     Patient Active Problem List   Diagnosis    Encounter for colonoscopy due to history of colonic polyp    Type 2 diabetes mellitus without complication, without long-term current use of insulin (HCC)    Elbow pain, chronic, right    Persistent atrial fibrillation (HCC)    Essential hypertension    Mixed dyslipidemia    Ascending aorta enlargement (HCC)    COVID-19    HSV-1 (herpes simplex virus 1) infection    Gout    Erectile dysfunction    History of appendectomy    Skin tag    Paroxysmal atrial fibrillation (HCC)    YEHUDA (obstructive sleep apnea)    Shoulder pain, right    Asymptomatic varicose veins of right lower extremity    Callus between toes    Class 1 obesity due to excess calories with serious comorbidity and body mass index (BMI) of 30.0 to 30.9 in adult    Hyperlipidemia    Persistent cough    Thumb pain      Past Medical and Surgical History:     Past Medical History:   Diagnosis Date    Appendicitis     Atrial fibrillation (HCC)     Colon polyp     Diabetes mellitus  (HCC)     Hyperlipidemia     Hypertension      Past Surgical History:   Procedure Laterality Date    APPENDECTOMY      CHOLECYSTECTOMY      COLONOSCOPY        Family History:     Family History   Problem Relation Age of Onset    Throat cancer Father     Prostate cancer Brother     Alcohol abuse Brother       Social History:     Social History     Socioeconomic History    Marital status:      Spouse name: None    Number of children: None    Years of education: None    Highest education level: None   Occupational History    None   Tobacco Use    Smoking status: Never     Passive exposure: Never    Smokeless tobacco: Never   Vaping Use    Vaping status: Never Used   Substance and Sexual Activity    Alcohol use: Not Currently    Drug use: Never    Sexual activity: Not Currently     Partners: Female   Other Topics Concern    None   Social History Narrative    None     Social Determinants of Health     Financial Resource Strain: Low Risk  (1/13/2023)    Overall Financial Resource Strain (CARDIA)     Difficulty of Paying Living Expenses: Not hard at all   Food Insecurity: No Food Insecurity (5/7/2024)    Hunger Vital Sign     Worried About Running Out of Food in the Last Year: Never true     Ran Out of Food in the Last Year: Never true   Transportation Needs: No Transportation Needs (5/7/2024)    PRAPARE - Transportation     Lack of Transportation (Medical): No     Lack of Transportation (Non-Medical): No   Physical Activity: Not on file   Stress: Not on file   Social Connections: Not on file   Intimate Partner Violence: Not on file   Housing Stability: Low Risk  (5/7/2024)    Housing Stability Vital Sign     Unable to Pay for Housing in the Last Year: No     Number of Places Lived in the Last Year: 1     Unstable Housing in the Last Year: No      Medications and Allergies:     Current Outpatient Medications   Medication Sig Dispense Refill    apixaban (Eliquis) 5 mg Take 1 tablet (5 mg total) by mouth 2 (two)  times a day 60 tablet 2    atorvastatin (LIPITOR) 20 mg tablet Take 20 mg by mouth daily      lisinopril (ZESTRIL) 5 mg tablet TAKE ONE TABLET BY MOUTH EVERY DAY FOR BLOOD PRESSURE/HEART      metFORMIN (GLUCOPHAGE) 500 mg tablet Take 1,000 mg by mouth      metoprolol tartrate (LOPRESSOR) 25 mg tablet Take 0.5 tablets by mouth 2 (two) times a day      Multiple Vitamins-Minerals (CENTRUM ADULTS PO) Take 50 mg by mouth      omeprazole (PriLOSEC) 20 mg delayed release capsule       tadalafil (CIALIS) 20 MG tablet Take 1 tablet (20 mg total) by mouth daily as needed for erectile dysfunction 10 tablet 0    famotidine (PEPCID) 20 mg tablet Take 1 tablet (20 mg total) by mouth 2 (two) times a day for 14 days 28 tablet 0    fluticasone (FLONASE) 50 mcg/act nasal spray 1 spray into each nostril daily for 7 days 9.9 mL 0    promethazine-dextromethorphan (PHENERGAN-DM) 6.25-15 mg/5 mL oral syrup Take 5 mL by mouth 4 (four) times a day as needed for cough 118 mL 0     No current facility-administered medications for this visit.     Allergies   Allergen Reactions    Other Shortness Of Breath     Cats and dogs      Immunizations:     Immunization History   Administered Date(s) Administered    COVID-19 MODERNA VACC 0.5 ML IM 02/13/2021, 03/13/2021, 01/18/2022    INFLUENZA 09/01/2018, 10/18/2021    Influenza Quadrivalent Preservative Free 3 years and older IM 10/05/2020    Influenza, Seasonal Vaccine, Quadrivalent, Adjuvanted, .5e 10/18/2021, 10/07/2022    Influenza, high dose seasonal 0.7 mL 09/22/2023    Pneumococcal Conjugate Vaccine 20-valent (Pcv20), Polysace 03/17/2023    Pneumococcal Polysaccharide PPV23 09/20/2017, 10/19/2020    Tdap 09/27/2018    Zoster Vaccine Recombinant 10/05/2020, 01/11/2021      Health Maintenance:         Topic Date Due    Colorectal Cancer Screening  02/10/2027    Hepatitis C Screening  Completed         Topic Date Due    COVID-19 Vaccine (4 - 2023-24 season) 09/01/2023      Medicare Screening Tests  and Risk Assessments:     Heath is here for his Subsequent Wellness visit.     Health Risk Assessment:   Patient rates overall health as good. Patient feels that their physical health rating is much better. Patient is very satisfied with their life. Eyesight was rated as same. Hearing was rated as same. Patient feels that their emotional and mental health rating is much better. Patients states they are never, rarely angry. Patient states they are sometimes unusually tired/fatigued. Pain experienced in the last 7 days has been none. Patient states that he has experienced no weight loss or gain in last 6 months.     Depression Screening:   PHQ-2 Score: 0      Fall Risk Screening:   In the past year, patient has experienced: no history of falling in past year      Home Safety:  Patient does not have trouble with stairs inside or outside of their home. Patient has working smoke alarms and has working carbon monoxide detector. Home safety hazards include: none.     Nutrition:   Current diet is Diabetic, Low Saturated Fat, Low Carb and No Added Salt.     Medications:   Patient is not currently taking any over-the-counter supplements. Patient is able to manage medications.     Activities of Daily Living (ADLs)/Instrumental Activities of Daily Living (IADLs):   Walk and transfer into and out of bed and chair?: Yes  Dress and groom yourself?: Yes    Bathe or shower yourself?: Yes    Feed yourself? Yes  Do your laundry/housekeeping?: Yes  Manage your money, pay your bills and track your expenses?: Yes  Make your own meals?: Yes    Do your own shopping?: Yes    Previous Hospitalizations:   Any hospitalizations or ED visits within the last 12 months?: No      Advance Care Planning:   Living will: No    Durable POA for healthcare: No    Advanced directive: No      PREVENTIVE SCREENINGS      Cardiovascular Screening:    General: Screening Not Indicated and History Lipid Disorder      Diabetes Screening:     General: Screening  "Not Indicated and History Diabetes      Colorectal Cancer Screening:     General: Screening Current      Abdominal Aortic Aneurysm (AAA) Screening:    Risk factors include: age between 65-74 yo        Lung Cancer Screening:     General: Screening Not Indicated      Hepatitis C Screening:    General: Screening Current    Screening, Brief Intervention, and Referral to Treatment (SBIRT)    Screening  Typical number of drinks in a day: 0  Typical number of drinks in a week: 0  Interpretation: Low risk drinking behavior.    AUDIT-C Screenin) How often did you have a drink containing alcohol in the past year? never  2) How many drinks did you have on a typical day when you were drinking in the past year? 0  3) How often did you have 6 or more drinks on one occasion in the past year? never    AUDIT-C Score: 0  Interpretation: Score 0-3 (male): Negative screen for alcohol misuse    Single Item Drug Screening:  How often have you used an illegal drug (including marijuana) or a prescription medication for non-medical reasons in the past year? never    Single Item Drug Screen Score: 0  Interpretation: Negative screen for possible drug use disorder    No results found.     Physical Exam:     /76 (BP Location: Left arm, Patient Position: Sitting, Cuff Size: Adult)   Pulse 73   Temp 98.3 °F (36.8 °C) (Tympanic)   Resp 16   Ht 6' 5\" (1.956 m)   Wt 112 kg (248 lb)   SpO2 97%   BMI 29.41 kg/m²     Physical Exam  Vitals and nursing note reviewed.   Constitutional:       General: He is not in acute distress.     Appearance: He is well-developed.   HENT:      Head: Normocephalic and atraumatic.      Mouth/Throat:      Mouth: Mucous membranes are moist.      Pharynx: Oropharynx is clear. No oropharyngeal exudate.   Eyes:      Extraocular Movements: Extraocular movements intact.      Pupils: Pupils are equal, round, and reactive to light.   Cardiovascular:      Rate and Rhythm: Normal rate and regular rhythm.      Heart " sounds: No murmur heard.  Pulmonary:      Effort: Pulmonary effort is normal. No respiratory distress.      Breath sounds: Normal breath sounds.   Abdominal:      Palpations: Abdomen is soft.      Tenderness: There is no abdominal tenderness.   Musculoskeletal:         General: Tenderness present. No swelling.      Cervical back: Neck supple.      Right lower leg: No edema.      Left lower leg: No edema.      Comments: Mild tenderness in left thumb when squeezing metacarpal joint    Skin:     General: Skin is warm and dry.      Capillary Refill: Capillary refill takes less than 2 seconds.   Neurological:      General: No focal deficit present.      Mental Status: He is alert and oriented to person, place, and time.   Psychiatric:         Mood and Affect: Mood normal.         Behavior: Behavior normal.          Chilango Flores MD

## 2024-05-07 NOTE — ASSESSMENT & PLAN NOTE
Complains of mild pain at proximal thumb joint x3 months  Not interfering with daily life, denies numbness or weakness in wrist hand or fingers  Physical exam benign other than some mild tenderness when squeezing joint, no effusion/erythema present  Patient uninterested in further work up at this visit  Recommend tylenol prn and observation  Can pursue imaging if symptoms are persistent

## 2024-05-07 NOTE — PATIENT INSTRUCTIONS
Medicare Preventive Visit Patient Instructions  Thank you for completing your Welcome to Medicare Visit or Medicare Annual Wellness Visit today. Your next wellness visit will be due in one year (5/8/2025).  The screening/preventive services that you may require over the next 5-10 years are detailed below. Some tests may not apply to you based off risk factors and/or age. Screening tests ordered at today's visit but not completed yet may show as past due. Also, please note that scanned in results may not display below.  Preventive Screenings:  Service Recommendations Previous Testing/Comments   Colorectal Cancer Screening  Colonoscopy    Fecal Occult Blood Test (FOBT)/Fecal Immunochemical Test (FIT)  Fecal DNA/Cologuard Test  Flexible Sigmoidoscopy Age: 45-75 years old   Colonoscopy: every 10 years (May be performed more frequently if at higher risk)  OR  FOBT/FIT: every 1 year  OR  Cologuard: every 3 years  OR  Sigmoidoscopy: every 5 years  Screening may be recommended earlier than age 45 if at higher risk for colorectal cancer. Also, an individualized decision between you and your healthcare provider will decide whether screening between the ages of 76-85 would be appropriate. Colonoscopy: 02/11/2022  FOBT/FIT: Not on file  Cologuard: Not on file  Sigmoidoscopy: Not on file    Screening Current     Prostate Cancer Screening Individualized decision between patient and health care provider in men between ages of 55-69   Medicare will cover every 12 months beginning on the day after your 50th birthday PSA: No results in last 5 years           Hepatitis C Screening Once for adults born between 1945 and 1965  More frequently in patients at high risk for Hepatitis C Hep C Antibody: 05/08/2014    Screening Current   Diabetes Screening 1-2 times per year if you're at risk for diabetes or have pre-diabetes Fasting glucose: No results in last 5 years (No results in last 5 years)  A1C: 6.2 (3/10/2023)  Screening Not  Indicated  History Diabetes   Cholesterol Screening Once every 5 years if you don't have a lipid disorder. May order more often based on risk factors. Lipid panel: 11/03/2023  Screening Not Indicated  History Lipid Disorder      Other Preventive Screenings Covered by Medicare:  Abdominal Aortic Aneurysm (AAA) Screening: covered once if your at risk. You're considered to be at risk if you have a family history of AAA or a male between the age of 65-75 who smoking at least 100 cigarettes in your lifetime.  Lung Cancer Screening: covers low dose CT scan once per year if you meet all of the following conditions: (1) Age 55-77; (2) No signs or symptoms of lung cancer; (3) Current smoker or have quit smoking within the last 15 years; (4) You have a tobacco smoking history of at least 20 pack years (packs per day x number of years you smoked); (5) You get a written order from a healthcare provider.  Glaucoma Screening: covered annually if you're considered high risk: (1) You have diabetes OR (2) Family history of glaucoma OR (3)  aged 50 and older OR (4)  American aged 65 and older  Osteoporosis Screening: covered every 2 years if you meet one of the following conditions: (1) Have a vertebral abnormality; (2) On glucocorticoid therapy for more than 3 months; (3) Have primary hyperparathyroidism; (4) On osteoporosis medications and need to assess response to drug therapy.  HIV Screening: covered annually if you're between the age of 15-65. Also covered annually if you are younger than 15 and older than 65 with risk factors for HIV infection. For pregnant patients, it is covered up to 3 times per pregnancy.    Immunizations:  Immunization Recommendations   Influenza Vaccine Annual influenza vaccination during flu season is recommended for all persons aged >= 6 months who do not have contraindications   Pneumococcal Vaccine   * Pneumococcal conjugate vaccine = PCV13 (Prevnar 13), PCV15 (Vaxneuvance),  PCV20 (Prevnar 20)  * Pneumococcal polysaccharide vaccine = PPSV23 (Pneumovax) Adults 19-65 yo with certain risk factors or if 65+ yo  If never received any pneumonia vaccine: recommend Prevnar 20 (PCV20)  Give PCV20 if previously received 1 dose of PCV13 or PPSV23   Hepatitis B Vaccine 3 dose series if at intermediate or high risk (ex: diabetes, end stage renal disease, liver disease)   Respiratory syncytial virus (RSV) Vaccine - COVERED BY MEDICARE PART D  * RSVPreF3 (Arexvy) CDC recommends that adults 60 years of age and older may receive a single dose of RSV vaccine using shared clinical decision-making (SCDM)   Tetanus (Td) Vaccine - COST NOT COVERED BY MEDICARE PART B Following completion of primary series, a booster dose should be given every 10 years to maintain immunity against tetanus. Td may also be given as tetanus wound prophylaxis.   Tdap Vaccine - COST NOT COVERED BY MEDICARE PART B Recommended at least once for all adults. For pregnant patients, recommended with each pregnancy.   Shingles Vaccine (Shingrix) - COST NOT COVERED BY MEDICARE PART B  2 shot series recommended in those 19 years and older who have or will have weakened immune systems or those 50 years and older     Health Maintenance Due:      Topic Date Due   • Colorectal Cancer Screening  02/10/2027   • Hepatitis C Screening  Completed     Immunizations Due:      Topic Date Due   • COVID-19 Vaccine (4 - 2023-24 season) 09/01/2023     Advance Directives   What are advance directives?  Advance directives are legal documents that state your wishes and plans for medical care. These plans are made ahead of time in case you lose your ability to make decisions for yourself. Advance directives can apply to any medical decision, such as the treatments you want, and if you want to donate organs.   What are the types of advance directives?  There are many types of advance directives, and each state has rules about how to use them. You may choose a  combination of any of the following:  Living will:  This is a written record of the treatment you want. You can also choose which treatments you do not want, which to limit, and which to stop at a certain time. This includes surgery, medicine, IV fluid, and tube feedings.   Durable power of  for healthcare (DPAHC):  This is a written record that states who you want to make healthcare choices for you when you are unable to make them for yourself. This person, called a proxy, is usually a family member or a friend. You may choose more than 1 proxy.  Do not resuscitate (DNR) order:  A DNR order is used in case your heart stops beating or you stop breathing. It is a request not to have certain forms of treatment, such as CPR. A DNR order may be included in other types of advance directives.  Medical directive:  This covers the care that you want if you are in a coma, near death, or unable to make decisions for yourself. You can list the treatments you want for each condition. Treatment may include pain medicine, surgery, blood transfusions, dialysis, IV or tube feedings, and a ventilator (breathing machine).  Values history:  This document has questions about your views, beliefs, and how you feel and think about life. This information can help others choose the care that you would choose.  Why are advance directives important?  An advance directive helps you control your care. Although spoken wishes may be used, it is better to have your wishes written down. Spoken wishes can be misunderstood, or not followed. Treatments may be given even if you do not want them. An advance directive may make it easier for your family to make difficult choices about your care.   Weight Management   Why it is important to manage your weight:  Being overweight increases your risk of health conditions such as heart disease, high blood pressure, type 2 diabetes, and certain types of cancer. It can also increase your risk for  osteoarthritis, sleep apnea, and other respiratory problems. Aim for a slow, steady weight loss. Even a small amount of weight loss can lower your risk of health problems.  How to lose weight safely:  A safe and healthy way to lose weight is to eat fewer calories and get regular exercise. You can lose up about 1 pound a week by decreasing the number of calories you eat by 500 calories each day.   Healthy meal plan for weight management:  A healthy meal plan includes a variety of foods, contains fewer calories, and helps you stay healthy. A healthy meal plan includes the following:  Eat whole-grain foods more often.  A healthy meal plan should contain fiber. Fiber is the part of grains, fruits, and vegetables that is not broken down by your body. Whole-grain foods are healthy and provide extra fiber in your diet. Some examples of whole-grain foods are whole-wheat breads and pastas, oatmeal, brown rice, and bulgur.  Eat a variety of vegetables every day.  Include dark, leafy greens such as spinach, kale, megan greens, and mustard greens. Eat yellow and orange vegetables such as carrots, sweet potatoes, and winter squash.   Eat a variety of fruits every day.  Choose fresh or canned fruit (canned in its own juice or light syrup) instead of juice. Fruit juice has very little or no fiber.  Eat low-fat dairy foods.  Drink fat-free (skim) milk or 1% milk. Eat fat-free yogurt and low-fat cottage cheese. Try low-fat cheeses such as mozzarella and other reduced-fat cheeses.  Choose meat and other protein foods that are low in fat.  Choose beans or other legumes such as split peas or lentils. Choose fish, skinless poultry (chicken or turkey), or lean cuts of red meat (beef or pork). Before you cook meat or poultry, cut off any visible fat.   Use less fat and oil.  Try baking foods instead of frying them. Add less fat, such as margarine, sour cream, regular salad dressing and mayonnaise to foods. Eat fewer high-fat foods. Some  examples of high-fat foods include french fries, doughnuts, ice cream, and cakes.  Eat fewer sweets.  Limit foods and drinks that are high in sugar. This includes candy, cookies, regular soda, and sweetened drinks.  Exercise:  Exercise at least 30 minutes per day on most days of the week. Some examples of exercise include walking, biking, dancing, and swimming. You can also fit in more physical activity by taking the stairs instead of the elevator or parking farther away from stores. Ask your healthcare provider about the best exercise plan for you.      © Copyright [employer] 2018 Information is for End User's use only and may not be sold, redistributed or otherwise used for commercial purposes. All illustrations and images included in CareNotes® are the copyrighted property of [employer] Inc. or "[employer]

## 2024-05-07 NOTE — PROGRESS NOTES
Assessment and Plan:     Problem List Items Addressed This Visit          Cardiovascular and Mediastinum    Ascending aorta enlargement (HCC)    Relevant Orders    CT chest wo contrast       Endocrine    Type 2 diabetes mellitus without complication, without long-term current use of insulin (HCC)    Relevant Orders    Comprehensive metabolic panel    Albumin / creatinine urine ratio       Surgery/Wound/Pain    Thumb pain     Complains of mild pain at proximal thumb joint x3 months  Not interfering with daily life, denies numbness or weakness in wrist hand or fingers  Physical exam benign other than some mild tenderness when squeezing joint, no effusion/erythema present  Patient uninterested in further work up at this visit  Recommend tylenol prn and observation  Can pursue imaging if symptoms are persistent            Other    Mixed dyslipidemia    Relevant Orders    Lipid Panel with Direct LDL reflex    Erectile dysfunction    Relevant Medications    tadalafil (CIALIS) 20 MG tablet     Other Visit Diagnoses       Welcome to Medicare preventive visit    -  Primary    Medicare annual wellness visit, subsequent                Depression Screening and Follow-up Plan: Patient was screened for depression during today's encounter. They screened negative with a PHQ-2 score of 0.    Preventive health issues were discussed with patient, and age appropriate screening tests were ordered as noted in patient's After Visit Summary.  Personalized health advice and appropriate referrals for health education or preventive services given if needed, as noted in patient's After Visit Summary.     History of Present Illness:     Patient presents for a Medicare Wellness Visit    Patient doing well today. He presents stating he has had some mild pain in his left thumb for 3 months.  Pain is not exacerbated by any particular activity and he does denies numbness or weakness in the wrist, hand or fingers.  He is not particularly interested  in investigating it further at this visit.  He additionally is requesting refills of his cialis.  Patient has been tracking his sugars regularly and walks frequently.  He has no other complaints or concerns at this visit.         Blood work ordered for next visit in 6 months.      Patient Care Team:  Romaine Aguilar MD as PCP - General (Internal Medicine)  Arianna Chavez as Diabetes Educator (Diabetes Services)     Review of Systems:     Review of Systems   Constitutional:  Negative for chills and fever.   HENT:  Negative for ear pain and sore throat.    Eyes:  Negative for pain and visual disturbance.   Respiratory:  Negative for cough and shortness of breath.    Cardiovascular:  Negative for chest pain and palpitations.   Gastrointestinal:  Negative for abdominal pain and vomiting.   Genitourinary:  Negative for dysuria and hematuria.   Musculoskeletal:  Negative for arthralgias and back pain.        Left thumb pain   Skin:  Negative for color change and rash.   Neurological:  Negative for seizures and syncope.   All other systems reviewed and are negative.       Problem List:     Patient Active Problem List   Diagnosis   • Encounter for colonoscopy due to history of colonic polyp   • Type 2 diabetes mellitus without complication, without long-term current use of insulin (HCC)   • Elbow pain, chronic, right   • Persistent atrial fibrillation (HCC)   • Essential hypertension   • Mixed dyslipidemia   • Ascending aorta enlargement (HCC)   • COVID-19   • HSV-1 (herpes simplex virus 1) infection   • Gout   • Erectile dysfunction   • History of appendectomy   • Skin tag   • Paroxysmal atrial fibrillation (HCC)   • YEHUDA (obstructive sleep apnea)   • Shoulder pain, right   • Asymptomatic varicose veins of right lower extremity   • Callus between toes   • Class 1 obesity due to excess calories with serious comorbidity and body mass index (BMI) of 30.0 to 30.9 in adult   • Hyperlipidemia   • Persistent cough   • Thumb  pain      Past Medical and Surgical History:     Past Medical History:   Diagnosis Date   • Appendicitis    • Atrial fibrillation (HCC)    • Colon polyp    • Diabetes mellitus (HCC)    • Hyperlipidemia    • Hypertension      Past Surgical History:   Procedure Laterality Date   • APPENDECTOMY     • CHOLECYSTECTOMY     • COLONOSCOPY        Family History:     Family History   Problem Relation Age of Onset   • Throat cancer Father    • Prostate cancer Brother    • Alcohol abuse Brother       Social History:     Social History     Socioeconomic History   • Marital status:      Spouse name: None   • Number of children: None   • Years of education: None   • Highest education level: None   Occupational History   • None   Tobacco Use   • Smoking status: Never     Passive exposure: Never   • Smokeless tobacco: Never   Vaping Use   • Vaping status: Never Used   Substance and Sexual Activity   • Alcohol use: Not Currently   • Drug use: Never   • Sexual activity: Not Currently     Partners: Female   Other Topics Concern   • None   Social History Narrative   • None     Social Determinants of Health     Financial Resource Strain: Low Risk  (1/13/2023)    Overall Financial Resource Strain (CARDIA)    • Difficulty of Paying Living Expenses: Not hard at all   Food Insecurity: No Food Insecurity (5/7/2024)    Hunger Vital Sign    • Worried About Running Out of Food in the Last Year: Never true    • Ran Out of Food in the Last Year: Never true   Transportation Needs: No Transportation Needs (5/7/2024)    PRAPARE - Transportation    • Lack of Transportation (Medical): No    • Lack of Transportation (Non-Medical): No   Physical Activity: Not on file   Stress: Not on file   Social Connections: Not on file   Intimate Partner Violence: Not on file   Housing Stability: Low Risk  (5/7/2024)    Housing Stability Vital Sign    • Unable to Pay for Housing in the Last Year: No    • Number of Places Lived in the Last Year: 1    • Unstable  Housing in the Last Year: No      Medications and Allergies:     Current Outpatient Medications   Medication Sig Dispense Refill   • apixaban (Eliquis) 5 mg Take 1 tablet (5 mg total) by mouth 2 (two) times a day 60 tablet 2   • atorvastatin (LIPITOR) 20 mg tablet Take 20 mg by mouth daily     • lisinopril (ZESTRIL) 5 mg tablet TAKE ONE TABLET BY MOUTH EVERY DAY FOR BLOOD PRESSURE/HEART     • metFORMIN (GLUCOPHAGE) 500 mg tablet Take 1,000 mg by mouth     • metoprolol tartrate (LOPRESSOR) 25 mg tablet Take 0.5 tablets by mouth 2 (two) times a day     • Multiple Vitamins-Minerals (CENTRUM ADULTS PO) Take 50 mg by mouth     • omeprazole (PriLOSEC) 20 mg delayed release capsule      • tadalafil (CIALIS) 20 MG tablet Take 1 tablet (20 mg total) by mouth daily as needed for erectile dysfunction 10 tablet 0   • famotidine (PEPCID) 20 mg tablet Take 1 tablet (20 mg total) by mouth 2 (two) times a day for 14 days 28 tablet 0   • fluticasone (FLONASE) 50 mcg/act nasal spray 1 spray into each nostril daily for 7 days 9.9 mL 0   • promethazine-dextromethorphan (PHENERGAN-DM) 6.25-15 mg/5 mL oral syrup Take 5 mL by mouth 4 (four) times a day as needed for cough 118 mL 0     No current facility-administered medications for this visit.     Allergies   Allergen Reactions   • Other Shortness Of Breath     Cats and dogs      Immunizations:     Immunization History   Administered Date(s) Administered   • COVID-19 MODERNA VACC 0.5 ML IM 02/13/2021, 03/13/2021, 01/18/2022   • INFLUENZA 09/01/2018, 10/18/2021   • Influenza Quadrivalent Preservative Free 3 years and older IM 10/05/2020   • Influenza, Seasonal Vaccine, Quadrivalent, Adjuvanted, .5e 10/18/2021, 10/07/2022   • Influenza, high dose seasonal 0.7 mL 09/22/2023   • Pneumococcal Conjugate Vaccine 20-valent (Pcv20), Polysace 03/17/2023   • Pneumococcal Polysaccharide PPV23 09/20/2017, 10/19/2020   • Tdap 09/27/2018   • Zoster Vaccine Recombinant 10/05/2020, 01/11/2021      Health  Maintenance:         Topic Date Due   • Colorectal Cancer Screening  02/10/2027   • Hepatitis C Screening  Completed         Topic Date Due   • COVID-19 Vaccine (4 - 2023-24 season) 09/01/2023      Medicare Screening Tests and Risk Assessments:     Heath is here for his Subsequent Wellness visit.     Health Risk Assessment:   Patient rates overall health as good. Patient feels that their physical health rating is much better. Patient is very satisfied with their life. Eyesight was rated as same. Hearing was rated as same. Patient feels that their emotional and mental health rating is much better. Patients states they are never, rarely angry. Patient states they are sometimes unusually tired/fatigued. Pain experienced in the last 7 days has been none. Patient states that he has experienced no weight loss or gain in last 6 months.     Depression Screening:   PHQ-2 Score: 0      Fall Risk Screening:   In the past year, patient has experienced: no history of falling in past year      Home Safety:  Patient does not have trouble with stairs inside or outside of their home. Patient has working smoke alarms and has working carbon monoxide detector. Home safety hazards include: none.     Nutrition:   Current diet is Diabetic, Low Saturated Fat, Low Carb and No Added Salt.     Medications:   Patient is not currently taking any over-the-counter supplements. Patient is able to manage medications.     Activities of Daily Living (ADLs)/Instrumental Activities of Daily Living (IADLs):   Walk and transfer into and out of bed and chair?: Yes  Dress and groom yourself?: Yes    Bathe or shower yourself?: Yes    Feed yourself? Yes  Do your laundry/housekeeping?: Yes  Manage your money, pay your bills and track your expenses?: Yes  Make your own meals?: Yes    Do your own shopping?: Yes    Previous Hospitalizations:   Any hospitalizations or ED visits within the last 12 months?: No      Advance Care Planning:   Living will: No   "  Durable POA for healthcare: No    Advanced directive: No      PREVENTIVE SCREENINGS      Cardiovascular Screening:    General: Screening Not Indicated and History Lipid Disorder      Diabetes Screening:     General: Screening Not Indicated and History Diabetes      Colorectal Cancer Screening:     General: Screening Current      Abdominal Aortic Aneurysm (AAA) Screening:    Risk factors include: age between 65-74 yo        Lung Cancer Screening:     General: Screening Not Indicated      Hepatitis C Screening:    General: Screening Current    Screening, Brief Intervention, and Referral to Treatment (SBIRT)    Screening  Typical number of drinks in a day: 0  Typical number of drinks in a week: 0  Interpretation: Low risk drinking behavior.    AUDIT-C Screenin) How often did you have a drink containing alcohol in the past year? never  2) How many drinks did you have on a typical day when you were drinking in the past year? 0  3) How often did you have 6 or more drinks on one occasion in the past year? never    AUDIT-C Score: 0  Interpretation: Score 0-3 (male): Negative screen for alcohol misuse    Single Item Drug Screening:  How often have you used an illegal drug (including marijuana) or a prescription medication for non-medical reasons in the past year? never    Single Item Drug Screen Score: 0  Interpretation: Negative screen for possible drug use disorder    No results found.     Physical Exam:     /76 (BP Location: Left arm, Patient Position: Sitting, Cuff Size: Adult)   Pulse 73   Temp 98.3 °F (36.8 °C) (Tympanic)   Resp 16   Ht 6' 5\" (1.956 m)   Wt 112 kg (248 lb)   SpO2 97%   BMI 29.41 kg/m²     Physical Exam  Vitals and nursing note reviewed.   Constitutional:       General: He is not in acute distress.     Appearance: He is well-developed.   HENT:      Head: Normocephalic and atraumatic.      Mouth/Throat:      Mouth: Mucous membranes are moist.      Pharynx: Oropharynx is clear. No " oropharyngeal exudate.   Eyes:      Extraocular Movements: Extraocular movements intact.      Pupils: Pupils are equal, round, and reactive to light.   Cardiovascular:      Rate and Rhythm: Normal rate and regular rhythm.      Heart sounds: No murmur heard.  Pulmonary:      Effort: Pulmonary effort is normal. No respiratory distress.      Breath sounds: Normal breath sounds.   Abdominal:      Palpations: Abdomen is soft.      Tenderness: There is no abdominal tenderness.   Musculoskeletal:         General: Tenderness present. No swelling.      Cervical back: Neck supple.      Right lower leg: No edema.      Left lower leg: No edema.      Comments: Mild tenderness in left thumb when squeezing metacarpal joint    Skin:     General: Skin is warm and dry.      Capillary Refill: Capillary refill takes less than 2 seconds.   Neurological:      General: No focal deficit present.      Mental Status: He is alert and oriented to person, place, and time.   Psychiatric:         Mood and Affect: Mood normal.         Behavior: Behavior normal.        Chilango Flores MD

## 2024-05-08 ENCOUNTER — APPOINTMENT (OUTPATIENT)
Dept: LAB | Facility: CLINIC | Age: 69
End: 2024-05-08
Payer: MEDICARE

## 2024-05-08 DIAGNOSIS — E11.9 TYPE 2 DIABETES MELLITUS WITHOUT COMPLICATION, WITHOUT LONG-TERM CURRENT USE OF INSULIN (HCC): ICD-10-CM

## 2024-05-08 DIAGNOSIS — Z00.00 MEDICARE ANNUAL WELLNESS VISIT, SUBSEQUENT: ICD-10-CM

## 2024-05-08 DIAGNOSIS — E78.2 MIXED DYSLIPIDEMIA: ICD-10-CM

## 2024-05-08 LAB
ALBUMIN SERPL BCP-MCNC: 4.5 G/DL (ref 3.5–5)
ALP SERPL-CCNC: 48 U/L (ref 34–104)
ALT SERPL W P-5'-P-CCNC: 19 U/L (ref 7–52)
ANION GAP SERPL CALCULATED.3IONS-SCNC: 6 MMOL/L (ref 4–13)
AST SERPL W P-5'-P-CCNC: 18 U/L (ref 13–39)
BILIRUB SERPL-MCNC: 2.2 MG/DL (ref 0.2–1)
BUN SERPL-MCNC: 25 MG/DL (ref 5–25)
CALCIUM SERPL-MCNC: 9.6 MG/DL (ref 8.4–10.2)
CHLORIDE SERPL-SCNC: 105 MMOL/L (ref 96–108)
CHOLEST SERPL-MCNC: 106 MG/DL
CO2 SERPL-SCNC: 27 MMOL/L (ref 21–32)
CREAT SERPL-MCNC: 1.05 MG/DL (ref 0.6–1.3)
CREAT UR-MCNC: 160.4 MG/DL
GFR SERPL CREATININE-BSD FRML MDRD: 72 ML/MIN/1.73SQ M
GLUCOSE SERPL-MCNC: 103 MG/DL (ref 65–140)
HDLC SERPL-MCNC: 43 MG/DL
LDLC SERPL CALC-MCNC: 29 MG/DL (ref 0–100)
MICROALBUMIN UR-MCNC: 18.6 MG/L
MICROALBUMIN/CREAT 24H UR: 12 MG/G CREATININE (ref 0–30)
POTASSIUM SERPL-SCNC: 4.3 MMOL/L (ref 3.5–5.3)
PROT SERPL-MCNC: 7.5 G/DL (ref 6.4–8.4)
SODIUM SERPL-SCNC: 138 MMOL/L (ref 135–147)
TRIGL SERPL-MCNC: 168 MG/DL

## 2024-05-08 PROCEDURE — 82043 UR ALBUMIN QUANTITATIVE: CPT

## 2024-05-08 PROCEDURE — 80061 LIPID PANEL: CPT

## 2024-05-08 PROCEDURE — 36415 COLL VENOUS BLD VENIPUNCTURE: CPT

## 2024-05-08 PROCEDURE — 82570 ASSAY OF URINE CREATININE: CPT

## 2024-05-08 PROCEDURE — 84153 ASSAY OF PSA TOTAL: CPT

## 2024-05-08 PROCEDURE — 80053 COMPREHEN METABOLIC PANEL: CPT

## 2024-05-09 DIAGNOSIS — E78.2 MIXED DYSLIPIDEMIA: Primary | ICD-10-CM

## 2024-05-09 DIAGNOSIS — E11.9 TYPE 2 DIABETES MELLITUS WITHOUT COMPLICATION, WITHOUT LONG-TERM CURRENT USE OF INSULIN (HCC): ICD-10-CM

## 2024-05-10 LAB — MISCELLANEOUS LAB TEST RESULT: NORMAL

## 2024-05-14 ENCOUNTER — HOSPITAL ENCOUNTER (OUTPATIENT)
Dept: CT IMAGING | Facility: HOSPITAL | Age: 69
Discharge: HOME | End: 2024-05-14
Payer: MEDICARE

## 2024-05-14 DIAGNOSIS — I77.89 ASCENDING AORTA ENLARGEMENT (HCC): ICD-10-CM

## 2024-05-14 PROCEDURE — 71250 CT THORAX DX C-: CPT

## 2024-05-16 ENCOUNTER — TELEPHONE (OUTPATIENT)
Dept: INTERNAL MEDICINE CLINIC | Facility: CLINIC | Age: 69
End: 2024-05-16

## 2024-05-22 NOTE — TELEPHONE ENCOUNTER
05/22/24 5:00 PM        The office's non-staff message must be re-sent as a staff message in order for the quality request to be processed per the approved workflow.     Thank you  Eduardo Lindsay             Thank you  Eduardo Lindsay

## 2024-06-08 PROBLEM — Z00.00 MEDICARE ANNUAL WELLNESS VISIT, SUBSEQUENT: Status: RESOLVED | Noted: 2021-07-23 | Resolved: 2024-06-08

## 2024-06-10 ENCOUNTER — APPOINTMENT (OUTPATIENT)
Dept: URGENT CARE | Age: 69
End: 2024-06-10

## 2024-06-26 DIAGNOSIS — G47.33 OSA (OBSTRUCTIVE SLEEP APNEA): Primary | ICD-10-CM

## 2024-06-27 LAB
DME PARACHUTE DELIVERY DATE REQUESTED: NORMAL
DME PARACHUTE ITEM DESCRIPTION: NORMAL
DME PARACHUTE ORDER STATUS: NORMAL
DME PARACHUTE SUPPLIER NAME: NORMAL
DME PARACHUTE SUPPLIER PHONE: NORMAL

## 2024-07-30 NOTE — PROGRESS NOTES
UROLOGY FOLLOW-UP ENCOUNTER    Heath Schuster is a 68 y.o. male with ED    Pertinent non-urologic PMH: DM (hemoglobin A1c 6.2 on 3/10/2023), HLD, HTN, atrial fibrillation on Eliquis    Pertinent non-urologic PSH: Appendectomy, cholecystectomy    Anticoagulation: Eliquis    No hernia surgeries    ED started around 2021. Started tadalafil around then. Does went up eventually to 20 mg PRN. Happy on the medication. He was given option of ICI but he declined.    [facility]    Random bladder scan 117 cc on 8/2/2024    Assessment and plan:     ED    [facility].  He is currently on tadalafil 20 mg.  He is overall happy with this and getting good erections for now.  Him and his partner wanted to discuss penile implant as they believe that he may possibly need in the future since they do not think he will be able to do ICI once his PDE 5 inhibitors are no longer effective.        Based on our conversation, the patient received a fair amount of information today regarding the inflatable penile implant.      I have discussed and reviewed all of the details of the patient’s history as well as physical examination with the patient. The patient understands the risks, benefits, and all alternatives to surgery. He understands that surgical therapy for his erectile dysfunction is indicated having failed more conservative measures.    We reviewed the various treatment modalities for erectile dysfunction and as it relates to him, we discussed the treatments he has tried as well as general goals of treatments for erectile dysfunction in general.  In the end, we reviewed erectile dysfunction treatment consisting of phosphodiesterase inhibitors for those that are without contraindication, vacuum devices, intraurethral alprostadil also known as MUSE, penile injection therapy and the penile implant.  He was keenly interested in learning more details regarding the  penile implant.    Specifically, he understands that prosthetic surgery in his situation is a complicated task that might require further revision in the future. He also understands that perceived penile shortening is a possibility after this surgery, and that his current stretched penile length is the most predictive factor that determines postoperative penile length. He understands that in the setting of infection, all device components will have to be removed and that in revision cases, the rate of infection seems to be higher in the reported literature that virginal cases. As is the case for penile prosthesis surgery, the patient comprehends that his implanted device will have 3 components (penile shaft cylinders, a penile pump, and a fluid containing reservoir that is placed in the abdominal space). He knows that damage to surrounding structures during prosthesis surgery involving these components is a small but real risk. With manipulation of his abdominal reservoir, specific risks include reservoir herniation out of the inguinal ring, palpability of the reservoir, the need for an additional incision for reservoir placement, further dissection in the abdominal area possibly causing further pain, as well as the risk of bowel injury, vascular injury, and/or bladder injury. Manipulation of the scrotal pump and penile shaft cylinders could lead to hematoma formation in the scrotum, curvature abnormalities of the penis, or migration/herniation of the pump or cylinders to a less suitable area in the scrotum. We also spent some time discussing that glans engorgement is something that is often reported to be less so in those that receive penile implants and that this might be a permanent change following surgery. I have also explained though that the satisfaction overall from this ED treatment is extremely high in many prior reports in the literature.    I explained to the patient that penile implant does not allow  for increased size of phallus.  In other words, the properly sized penile implant would cause the penis to be the same length as the current flaccid penis held straight out on tension.    I specifically also discussed the two different types of penile implants that are generally available for patients including the Coloplast Titan touch implant and the AMS LGX/CX implants. I explained that I am well versed and perform many of both types of implants and briefly reviewed the characteristics of each type of implant - including the individual benefits of selecting one over the other. In the end, I have explained that there is robust data that both implants offer patients and their partners a high degree of satisfaction, well over 90%.    Lastly, the patient has been counseled regarding the need for penile rehabilitation following surgery. This will involve regular (e.g. daily) manipulation of his penile prosthesis shaft and pump in order to facilitate usage during coitus. He understands that not performing rehabilitation as counseled may result in capsule formation surrounding his device as well as ultimate device malfunction.    I have reiterated that penile implantation will render other conservative measures such as PDE5s, ICI, and MUSE therapy largely useless (even if we were to remove the IPP at a later date). The patient specifically understands.    I had a long and reiterative conversation about this surgery at length with the patient today.  He understands decision to move forward with surgery is purely elective.  Specifically, he understands specific risks to include bleeding, infection, damage to surrounding structures, need for further surgery, urethral erosion, device infection, device malfunction, device failure, need for device replacement, device infection. We had a long and honest conversation today regarding the procedure as well as the postoperative recovery process as well as expectations of having a  "catheter if unable to void.  He remains highly motivated - and further understands that pain following surgery can last for weeks if not months. He knows that he will have to wear tight supportive underwear and to avoid any strenuous activities following surgery until cleared by me. All of his questions have been answered.      He received information of the penile implant today.  At this time, since the tadalafil is overall working for him, we will hold off for now.  He will call the office back for another appointment if he would like to plan for penile implant in the future.            PLAN  -He will continue his prescription of [facility]  -He will follow up with us as needed.  Overall the oral medications are working, but he knows that if their efficacy wears off over time, he would need to consider ICI and penile implant.  He really does not think he will be able to do ICI in the future, so he believes that once the pills fail, he would like to try penile implant.        Patient's significant other, Sveta, present in office today and assisted with history      Portions of the above record have been created with voice recognition software.  Occasional wrong word or \"sound alike\" substitution may have occurred due to the inherent limitations of voice recognition software.  Read the chart carefully and recognize, using context, where substitution may have occurred.      Aniket Bliss,         Chief Complaint     ED    History of Present Illness     See summary above    No fevers or chills      The following portions of the patient's history were reviewed and updated as appropriate: allergies, current medications, past family history, past medical history, past social history, past surgical history and problem list.        AUA SYMPTOM SCORE      Flowsheet Row Most Recent Value   AUA SYMPTOM SCORE    How often have you had a sensation of not emptying your bladder completely after you " "finished urinating? 0 (P)     How often have you had to urinate again less than two hours after you finished urinating? 1 (P)     How often have you found you stopped and started again several times when you urinate? 0 (P)     How often have you found it difficult to postpone urination? 1 (P)     How often have you had a weak urinary stream? 0 (P)     How often have you had to push or strain to begin urination? 0 (P)     How many times did you most typically get up to urinate from the time you went to bed at night until the time you got up in the morning? 1 (P)     Quality of Life: If you were to spend the rest of your life with your urinary condition just the way it is now, how would you feel about that? 2 (P)     AUA SYMPTOM SCORE 3 (P)              Review of Systems     Review of Systems    Allergies     Allergies   Allergen Reactions    Other Shortness Of Breath     Cats and dogs       Physical Exam     Physical Exam        Vital Signs  Vitals:    08/02/24 1045   BP: 120/70   BP Location: Left arm   Patient Position: Sitting   Cuff Size: Standard   Pulse: 92   SpO2: 98%   Weight: 114 kg (251 lb)   Height: 6' 5\" (1.956 m)         Current Medications       Current Outpatient Medications:     atorvastatin (LIPITOR) 20 mg tablet, Take 20 mg by mouth daily, Disp: , Rfl:     lisinopril (ZESTRIL) 5 mg tablet, TAKE ONE TABLET BY MOUTH EVERY DAY FOR BLOOD PRESSURE/HEART, Disp: , Rfl:     metFORMIN (GLUCOPHAGE) 500 mg tablet, Take 1,000 mg by mouth, Disp: , Rfl:     metoprolol tartrate (LOPRESSOR) 25 mg tablet, Take 0.5 tablets by mouth 2 (two) times a day, Disp: , Rfl:     Multiple Vitamins-Minerals (CENTRUM ADULTS PO), Take 50 mg by mouth, Disp: , Rfl:     omeprazole (PriLOSEC) 20 mg delayed release capsule, , Disp: , Rfl:     apixaban (Eliquis) 5 mg, Take 1 tablet (5 mg total) by mouth 2 (two) times a day, Disp: 60 tablet, Rfl: 2    Active Problems     Patient Active Problem List   Diagnosis    Encounter for " colonoscopy due to history of colonic polyp    Type 2 diabetes mellitus without complication, without long-term current use of insulin (HCC)    Elbow pain, chronic, right    Persistent atrial fibrillation (HCC)    Essential hypertension    Mixed dyslipidemia    Ascending aorta enlargement (HCC)    COVID-19    HSV-1 (herpes simplex virus 1) infection    Gout    Erectile dysfunction    History of appendectomy    Skin tag    Paroxysmal atrial fibrillation (HCC)    YEHUDA (obstructive sleep apnea)    Shoulder pain, right    Asymptomatic varicose veins of right lower extremity    Callus between toes    Class 1 obesity due to excess calories with serious comorbidity and body mass index (BMI) of 30.0 to 30.9 in adult    Hyperlipidemia    Persistent cough    Thumb pain       Past Medical History     Past Medical History:   Diagnosis Date    Appendicitis     Atrial fibrillation (HCC)     Colon polyp     Diabetes mellitus (HCC)     Hyperlipidemia     Hypertension        Surgical History     Past Surgical History:   Procedure Laterality Date    APPENDECTOMY      CHOLECYSTECTOMY      COLONOSCOPY           Family History     Family History   Problem Relation Age of Onset    Throat cancer Father     Prostate cancer Brother     Alcohol abuse Brother        Social History     Social History     Social History     Tobacco Use   Smoking Status Never    Passive exposure: Never   Smokeless Tobacco Never       Pertinent Lab Values     Lab Results   Component Value Date    CREATININE 1.05 05/08/2024       Lab Results   Component Value Date    PSA 1.6 09/20/2017               Pertinent Imaging     N/A      Pertinent Pathology     N/A        I have spent 40 minutes with Patient and family today in which greater than 50% of this time was spent in counseling/coordination of care regarding Diagnostic results, Prognosis, Risks and benefits of tx options, Instructions for management, Patient and family education, Importance of tx compliance,  Impressions, Counseling / Coordination of care, Documenting in the medical record, Reviewing / ordering tests, medicine, procedures  , and Obtaining or reviewing history  .    Please note this time includes cumulative time on the day of encounter, including reviewing medical records and/or coordinating care among the patient's other specialists.

## 2024-08-02 ENCOUNTER — OFFICE VISIT (OUTPATIENT)
Dept: UROLOGY | Facility: CLINIC | Age: 69
End: 2024-08-02
Payer: MEDICARE

## 2024-08-02 VITALS
DIASTOLIC BLOOD PRESSURE: 70 MMHG | WEIGHT: 251 LBS | HEIGHT: 77 IN | SYSTOLIC BLOOD PRESSURE: 120 MMHG | OXYGEN SATURATION: 98 % | BODY MASS INDEX: 29.64 KG/M2 | HEART RATE: 92 BPM

## 2024-08-02 DIAGNOSIS — N52.9 ERECTILE DYSFUNCTION, UNSPECIFIED ERECTILE DYSFUNCTION TYPE: Primary | ICD-10-CM

## 2024-08-02 LAB — POST-VOID RESIDUAL VOLUME, ML POC: 117 ML

## 2024-08-02 PROCEDURE — 51798 US URINE CAPACITY MEASURE: CPT | Performed by: UROLOGY

## 2024-08-02 PROCEDURE — 99215 OFFICE O/P EST HI 40 MIN: CPT | Performed by: UROLOGY

## 2024-09-06 LAB — HBA1C MFR BLD HPLC: 6.8 %

## 2024-09-12 ENCOUNTER — CONSULT (OUTPATIENT)
Dept: DIABETES SERVICES | Facility: CLINIC | Age: 69
End: 2024-09-12
Payer: MEDICARE

## 2024-09-12 DIAGNOSIS — E78.5 HYPERLIPIDEMIA, UNSPECIFIED HYPERLIPIDEMIA TYPE: ICD-10-CM

## 2024-09-12 DIAGNOSIS — E11.9 TYPE 2 DIABETES MELLITUS WITHOUT COMPLICATION, WITHOUT LONG-TERM CURRENT USE OF INSULIN (HCC): Primary | ICD-10-CM

## 2024-09-12 DIAGNOSIS — E66.09 CLASS 1 OBESITY DUE TO EXCESS CALORIES WITH SERIOUS COMORBIDITY AND BODY MASS INDEX (BMI) OF 30.0 TO 30.9 IN ADULT: ICD-10-CM

## 2024-09-12 PROCEDURE — 97802 MEDICAL NUTRITION INDIV IN: CPT

## 2024-09-12 NOTE — PATIENT INSTRUCTIONS
Eat 3 regular meals ~4-5 hours apart with 1-2 snacks per day as needed.  Keep carbohydrate intake consistent. Aim for 45-60 grams of carbohydrate per meal and 0-15 grams of carbohydrate per snack.  If interested, check if insurance covers [employer] or Dexcom CGM.

## 2024-09-12 NOTE — PROGRESS NOTES
"Medical Nutrition Therapy    Assessment    Visit Type: Initial visit  Chief complaint/Medical Diagnosis/reason for visit:   E11.9 (ICD-10-CM) - Type 2 diabetes mellitus without complication, without long-term current use of insulin ([facility])   E78.5 (ICD-10-CM) - Hyperlipidemia, unspecified hyperlipidemia type   E66.09, Z68.30 (ICD-10-CM) - Class 1 obesity due to excess calories with serious comorbidity and body mass index (BMI) of 30.0 to 30.9 in adult     HPI Heath Schuster \"Tavo" was seen today accompanied by his significant other regarding type 2 diabetes. Patient is currently taking 1,000 mg of metformin daily. Last HbA1c was 6.2% in March of 2023. Verbally reports last [facility]. Checks blood sugar at home, but no blood sugar log to review today. Ramakrishna's blood sugar appears to be well controlled, but still desires HbA1c to be lower. Discussed guidelines for blood sugar control. Patient also desires weight loss. Conducted dietary recall. See below for details. Explained basic pathophysiology of diabetes and impact of diet on blood glucose levels. Together we discussed what foods contain carbohydrates, reading a food label, timing of meals and snacks, serving sizes, the role of fiber, the importance of consistent carbohydrate intake in the appropriate amounts. Used the portion booklet to teach Ramakrishna more about food groups and basic carbohydrate counting. Encouraged 3 regular meals ~4-5 hours apart with 1-2 snacks per day as needed. Discussed keeping carbohydrate intake consistent. Goal is 45-60 grams of carbohydrate per meal and 0-15 grams of carbohydrate per snack. Discussed calorie goal of 1,950 calories per day with adequate protein intake. Plan is to follow-up in ~2 months for continued education, monitoring and support.    Ht Readings from Last 1 Encounters:   08/02/24 6' 5\" (1.956 m)     Wt Readings from Last 3 Encounters:   08/02/24 114 kg (251 lb)   05/07/24 112 kg (248 lb)   05/03/24 111 " kg (243 lb 12.8 oz)     Weight Change: No - patient reports weight fluctuates a couple pounds up and down. States he is 258-259 pounds on his home scale. Desires weight loss. Discussed short-term goal of 5% weight loss of ~12 pounds.     Barriers to Learning: no barriers    Do you follow any special diet presently?: No  Who shops: patient and significant other  Who cooks: significant other mostly    Food Log: Completed via the method of usual daily food recall.    Breakfast: 2 eggs and 2 pieces of wheat toast with butter, 2 cups of coffee with zero sugar creamer, water, once in a while will go out for breakfast but gets similar breakfast with sausage  Morning Snack: muncho's (16 g CHO), adkin's bar (8 grams), raza protein shake, fruit - peaches, watermelon, pears, apples, blueberries  Lunch: tuna fish sandwich with chips or adkin chocolate protein shake  Afternoon Snack: muncho's (16 g CHO), adkin's bar (8 grams), raza protein shake, fruit - peaches, watermelon, pears, apples, blueberries  Dinner: chicken or pork with a salad, sweet potato steak fries, green beans, brown rice sometimes, pasta but not often  Evening Snack: sometimes no snack or similar to morning snacks  Beverages: water, 2 cups of coffee per day with zero sugar creamer, diet decaf iced tea, beer once in a while  Exercise walks 2-2.5 miles daily when the weather is appropriate    Estimated calorie needs: ~1,950 kcals/day    Carbohydrate: 45-60 g/meal, 0-15 g/snack       Fat: 5 servings/day      Protein: 8 oz/day    Nutrition Diagnosis:  Inconsistent carbohydrate intake related to food and nutrition related knowledge deficit concerning appropriate amount and timing of carbohydrate intake as evidenced by estimated carbohydrate intake that is different from recommended types or ingested on an irregular basis.    Intervention: increased fiber intake, label reading, behavior modification strategies, carbohydrate counting, meal timing, weight/ BMI  goals, meal planning, individualized meal plan, monitoring portion control, and exercise guidelines     Treatment Goals: Patient understands education and recommendations, Patient will consume 3 meals a day, Patient will monitor portion control, Patient will consume snacks, Patient will count carbohydrates, Patient will exercise, and Patient will monitor blood glucose    Monitoring and evaluation:    Term code indicator  FH 4.4 Mealtime Behavior Criteria: Eat 3 regular meals ~4-5 hours apart with 1-2 snacks per day as needed.  Term code indicator  FH 1.6.3 Carbohydrate Intake Criteria: Keep carbohydrate intake consistent. Aim for 45-60 grams of carbohydrate per meal and 0-15 grams of carbohydrate per snack.    Materials Provided: Portion booklet    Patient’s Response to Instruction:  Comprehension: good  Motivation: good  Expected Compliance: good    Start- Stop: 10:28-11:28  Total Minutes: 60 Minutes  Group or Individual Instruction: MNT-I  Other: Kayce Donald MD    Thank you for coming to the [facility] for education today. Please feel free to call with any questions or concerns.    Arianna Chavez, MS, RD, LDN  [id number]  [id number]  [place] [phone]

## 2024-09-19 ENCOUNTER — TELEPHONE (OUTPATIENT)
Age: 69
End: 2024-09-19

## 2024-09-19 ENCOUNTER — HOSPITAL ENCOUNTER (OUTPATIENT)
Dept: RADIOLOGY | Facility: HOSPITAL | Age: 69
Discharge: HOME | End: 2024-09-19
Payer: MEDICARE

## 2024-09-19 ENCOUNTER — OFFICE VISIT (OUTPATIENT)
Dept: INTERNAL MEDICINE CLINIC | Facility: CLINIC | Age: 69
End: 2024-09-19
Payer: MEDICARE

## 2024-09-19 VITALS
SYSTOLIC BLOOD PRESSURE: 112 MMHG | BODY MASS INDEX: 30.11 KG/M2 | RESPIRATION RATE: 16 BRPM | WEIGHT: 255 LBS | OXYGEN SATURATION: 97 % | TEMPERATURE: 99.1 F | HEIGHT: 77 IN | DIASTOLIC BLOOD PRESSURE: 70 MMHG | HEART RATE: 86 BPM

## 2024-09-19 DIAGNOSIS — R07.81 RIB PAIN ON LEFT SIDE: Primary | ICD-10-CM

## 2024-09-19 DIAGNOSIS — E78.5 HYPERLIPIDEMIA, UNSPECIFIED HYPERLIPIDEMIA TYPE: ICD-10-CM

## 2024-09-19 DIAGNOSIS — E11.9 TYPE 2 DIABETES MELLITUS WITHOUT COMPLICATION, WITHOUT LONG-TERM CURRENT USE OF INSULIN (HCC): ICD-10-CM
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
## 2024-09-19 DIAGNOSIS — R07.81 RIB PAIN ON LEFT SIDE: ICD-10-CM

## 2024-09-19 DIAGNOSIS — L98.9 ARM SKIN LESION, RIGHT: ICD-10-CM

## 2024-09-19 DIAGNOSIS — I10 ESSENTIAL HYPERTENSION: ICD-10-CM

## 2024-09-19 DIAGNOSIS — I48.19 PERSISTENT ATRIAL FIBRILLATION (HCC): ICD-10-CM

## 2024-09-19 PROBLEM — M25.511 SHOULDER PAIN, RIGHT: Status: RESOLVED | Noted: 2023-03-31 | Resolved: 2024-09-19

## 2024-09-19 PROCEDURE — 99214 OFFICE O/P EST MOD 30 MIN: CPT

## 2024-09-19 PROCEDURE — 71101 X-RAY EXAM UNILAT RIBS/CHEST: CPT

## 2024-09-19 PROCEDURE — G2211 COMPLEX E/M VISIT ADD ON: HCPCS

## 2024-09-19 NOTE — ASSESSMENT & PLAN NOTE
Stable maintained on Eliquis 5 mg once a day Lopressor 25 mg tablet  Recommend reengagement with sleep medicine for tubing/supplies given risk of A-fib related to YEHUDA

## 2024-09-19 NOTE — TELEPHONE ENCOUNTER
Ofelia received call from patient about his appointment.      Scheduled appointment with a referral for lesions with Dr Huber, and mistook the date, for today 9/19.    Patient called back and reached Ofelia letting her know that he received notice that the appointment was today, when we spoke I said tomorrow 9/20.

## 2024-09-19 NOTE — ASSESSMENT & PLAN NOTE
Blood Pressure: 112/70  Well controlled  Continue current regimen lisinopril 5 mg once a day Lopressor 25 mg a day

## 2024-09-19 NOTE — ASSESSMENT & PLAN NOTE
2-week hx right deltoid lesion no erythema, painful upon manipulation although not at rest no discharge  Suspect SCC   See media   Plan  Dermatology referral for biopsy

## 2024-09-19 NOTE — ASSESSMENT & PLAN NOTE
Lab Results   Component Value Date    HGBA1C 6.8 09/06/2024   Stable and at goal of less than 7.0  Recently seen by diabetic education  Currently maintained on metformin 1000 milligrams twice daily  Discussed continue lifestyle modifications including nutrition and physical activity.  Diabetic foot exam in office today low risk,  [facility]  Up-to-date with eye exam q. year last appointment January 2024    Orders:    Ambulatory Referral to Podiatry; Future

## 2024-09-19 NOTE — PROGRESS NOTES
Ambulatory Visit  Name: Heath Schuster      : 1955      MRN: [id number]  Encounter Provider: Fouzia Davis MD  Encounter Date: 2024   Encounter department: [facility] [place]    Assessment & Plan  Rib pain on left side  Lef sided anterolateral ches pain along rib cage after mechanical fall. No SOB, pain worse upon inspiration however tolerable.  Plan  Check chest x-ray ribs 3 views rule out multilevel fractures although less likely.  Continue with OTC Tylenol as needed, lidocaine patches, heating pad  Orders:    XR ribs left w pa chest min 3 views; Future    Ambulatory Referral to Dermatology; Future    Fall, initial encounter  Fall precautions discussed especially while on anticoagulation.  Orders:    XR ribs left w pa chest min 3 views; Future    Type 2 diabetes mellitus without complication, without long-term current use of insulin (Formerly [facility])    Lab Results   Component Value Date    HGBA1C 6.8 2024   Stable and at goal of less than 7.0  Recently seen by diabetic education  Currently maintained on metformin 1000 milligrams twice daily  Discussed continue lifestyle modifications including nutrition and physical activity.  Diabetic foot exam in office today low risk,  [facility]  Up-to-date with eye exam q. year last appointment 2024    Orders:    Ambulatory Referral to Podiatry; Future    Arm skin lesion, right  2-week hx right deltoid lesion no erythema, painful upon manipulation although not at rest no discharge  Suspect SCC   See media   Plan  Dermatology referral for biopsy          Essential hypertension  Blood Pressure: 112/70  Well controlled  Continue current regimen lisinopril 5 mg once a day Lopressor 25 mg a day         Hyperlipidemia, unspecified hyperlipidemia type  [facility] reviewed stable well-controlled continue Lipitor 20 mg tablet  LDL noted 37, HDL 46, cholesterol  101, triglycerides 89  If LDL repeat blood work in 6 months falls below 30 recommend decrease atorvastatin to 10 mg daily due to higher risk of bleeding especially while on anticoagulation         Persistent atrial fibrillation (HCC)  Stable maintained on Eliquis 5 mg once a day Lopressor 25 mg tablet  Recommend reengagement with sleep medicine for tubing/supplies given risk of A-fib related to YEHUDA            History of Present Illness     Mr. Schuster 68-year-old male past medical history of persistent A-fib on Eliquis, hypertension, hyperlipidemia, type 2 diabetes not on insulin who presents for acute visit after a mechanical fall last Tuesday.  Endorsed that fall after dark where he tripped on his feet falling on the left side hitting his chest wall, left knee and right fifth digit with some residual laceration and chest pain along the rib cage.  Chest pain stable at rest 1/10 upon exertion and deep breathing or twisting movements of 2 4-5/10.  Currently not taking OTC medications since not significantly bothersome although persistent.  Denied any head strike.  [facility] with [facility] today.  Compliant with all medications.  No acute issues.  In regards to diabetes will maintain on lifestyle modification and physical activity can walk up to 4 to 5 miles daily with her.  Recently seen by diabetic education.  Up-to-date with podiatry and ophthalmology.  Of note also has a lesion on the right deltoid noticeable over the past 2 weeks no significant change in growth painful upon manipulation however no discharge, erythema.  Does have history of sun exposure.      History obtained from : patient  Review of Systems   Constitutional:  Negative for chills and fever.   HENT:  Negative for ear pain and sore throat.    Eyes:  Negative for pain and visual disturbance.   Respiratory:  Negative for cough and shortness of breath.    Cardiovascular:  Positive for chest pain. Negative for palpitations.  "  Gastrointestinal:  Negative for abdominal pain and vomiting.   Genitourinary:  Negative for dysuria and hematuria.   Musculoskeletal:  Negative for arthralgias and back pain.   Skin:  Negative for color change and rash.   Neurological:  Negative for seizures and syncope.   All other systems reviewed and are negative.    Pertinent Medical History   Afib  HTN  T2DM  HLD        Objective     /70 (BP Location: Left arm, Patient Position: Sitting, Cuff Size: Large)   Pulse 86   Temp 99.1 °F (37.3 °C) (Tympanic)   Resp 16   Ht 6' 5\" (1.956 m)   Wt 116 kg (255 lb)   SpO2 97%   BMI 30.24 kg/m²     Physical Exam  Vitals and nursing note reviewed.   Constitutional:       General: He is not in acute distress.     Appearance: He is well-developed. He is not ill-appearing.   HENT:      Head: Normocephalic and atraumatic.      Mouth/Throat:      Mouth: Mucous membranes are moist.   Eyes:      Extraocular Movements: Extraocular movements intact.      Conjunctiva/sclera: Conjunctivae normal.      Pupils: Pupils are equal, round, and reactive to light.   Cardiovascular:      Rate and Rhythm: Normal rate. Rhythm irregular.      Pulses: no weak pulses.           Dorsalis pedis pulses are 2+ on the right side and 2+ on the left side.        Posterior tibial pulses are 2+ on the right side and 2+ on the left side.      Heart sounds: No murmur heard.  Pulmonary:      Effort: Pulmonary effort is normal. No respiratory distress.      Breath sounds: Normal breath sounds.   Chest:       Abdominal:      Palpations: Abdomen is soft.      Tenderness: There is no abdominal tenderness.   Musculoskeletal:         General: No swelling.      Cervical back: Normal range of motion and neck supple.      Right lower leg: No edema.      Left lower leg: No edema.        Feet:    Feet:      Right foot:      Skin integrity: Callus present. No ulcer, skin breakdown, erythema, warmth or dry skin.      Left foot:      Skin integrity: Callus " present. No ulcer, skin breakdown, erythema, warmth or dry skin.   Skin:     General: Skin is warm and dry.      Capillary Refill: Capillary refill takes less than 2 seconds.   Neurological:      Mental Status: He is alert.   Psychiatric:         Mood and Affect: Mood normal.   Patient's shoes and socks removed.    Right Foot/Ankle   Right Foot Inspection  Skin Exam: skin normal, skin intact, callus and callus. No dry skin, no warmth, no erythema, no maceration, no abnormal color, no pre-ulcer and no ulcer.     Toe Exam: ROM and strength within normal limits.     Sensory   Monofilament testing: diminished    Vascular  Capillary refills: < 3 seconds  The right DP pulse is 2+. The right PT pulse is 2+.     Left Foot/Ankle  Left Foot Inspection  Skin Exam: skin normal, skin intact and callus. No dry skin, no warmth, no erythema, no maceration, normal color, no pre-ulcer and no ulcer.     Toe Exam: ROM and strength within normal limits.     Sensory   Monofilament testing: diminished    Vascular  Capillary refills: < 3 seconds  The left DP pulse is 2+. The left PT pulse is 2+.     Assign Risk Category  No deformity present  No loss of protective sensation  No weak pulses  Risk: 0         Administrative Statements   Topics discussed with the patient / family include symptom assessment and management and medication review.

## 2024-09-19 NOTE — ASSESSMENT & PLAN NOTE
Lef sided anterolateral ches pain along rib cage after mechanical fall. No SOB, pain worse upon inspiration however tolerable.  Plan  Check chest x-ray ribs 3 views rule out multilevel fractures although less likely.  Continue with OTC Tylenol as needed, lidocaine patches, heating pad  Orders:    XR ribs left w pa chest min 3 views; Future    Ambulatory Referral to Dermatology; Future

## 2024-09-19 NOTE — ASSESSMENT & PLAN NOTE
Patient attended session 38 of Pulmonary Rehab Phase 2. See scanned in exercise session report in the Media tab.     [facility] well-controlled continue Lipitor 20 mg tablet  LDL noted 37, HDL 46, cholesterol 101, triglycerides 89  If LDL repeat blood work in 6 months falls below 30 recommend decrease atorvastatin to 10 mg daily due to higher risk of bleeding especially while on anticoagulation

## 2024-09-19 NOTE — TELEPHONE ENCOUNTER
Pt  wanted to let Dr. Willis know that [facility] / and he is going to [facility]ap [facility]

## 2024-09-20 ENCOUNTER — TELEPHONE (OUTPATIENT)
Dept: INTERNAL MEDICINE CLINIC | Facility: CLINIC | Age: 69
End: 2024-09-20

## 2024-09-20 NOTE — TELEPHONE ENCOUNTER
Called and spoke to patient. Opening in [place] Monday, 9/23 @ 12:30 pm (booked to hold). Patient states that unfortunately, he'll be unable to make it on Monday.    Informed patient that I'd call him with next cancellation. Patient verbalized understanding.    Cancelled appt.    (Updated notes on yesterdays appt that was scheduled in error to indicate that it was not a no show. PEP error.)

## 2024-09-20 NOTE — TELEPHONE ENCOUNTER
Received call from patient asking for an update on us finding him a sooner appt.    I sent Ofelia a message via Teams to ask her if patient was supposed to get a call back about an appt?    Ofelia stated that as soon as she found one she would call him.    I told patient tat as soon as Ofelia finds an appt for him she will call him.    Patient verbalized understanding

## 2024-09-20 NOTE — TELEPHONE ENCOUNTER
Spoke with patient about CXR results. Does show a anterolateral left sided non displaced fracture which can explained symptomatology after fall. Discussed at this time course will be conservative measurements such as OTC Tylenol of topicals such as Lidocaine patches or heating pads. Does not require further imagine or treatment. Patient verbalized understanding.

## 2024-09-24 NOTE — TELEPHONE ENCOUNTER
Called and spoke to patient.    Scheduled follow up for 10/3/2024 @ 8:10 pm at [facility] in [place] with Dr. Bush. Patient aware of office location. Patient aware to arrive 15 minutes prior.    (Being referred by PCP for R arm skin  lesion.)

## 2024-10-03 ENCOUNTER — OFFICE VISIT (OUTPATIENT)
Dept: DERMATOLOGY | Facility: CLINIC | Age: 69
End: 2024-10-03
Payer: MEDICARE

## 2024-10-03 VITALS — BODY MASS INDEX: 30.11 KG/M2 | WEIGHT: 255 LBS | TEMPERATURE: 97.7 F | HEIGHT: 77 IN

## 2024-10-03 DIAGNOSIS — D48.9 NEOPLASM OF UNCERTAIN BEHAVIOR: Primary | ICD-10-CM

## 2024-10-03 DIAGNOSIS — L98.9 ARM SKIN LESION, RIGHT: ICD-10-CM

## 2024-10-03 PROCEDURE — 88305 TISSUE EXAM BY PATHOLOGIST: CPT | Performed by: STUDENT IN AN ORGANIZED HEALTH CARE EDUCATION/TRAINING PROGRAM

## 2024-10-03 PROCEDURE — 99214 OFFICE O/P EST MOD 30 MIN: CPT | Performed by: REGISTERED NURSE

## 2024-10-03 PROCEDURE — 11102 TANGNTL BX SKIN SINGLE LES: CPT | Performed by: REGISTERED NURSE

## 2024-10-03 NOTE — PROGRESS NOTES
"[facility] Note     Patient Name: Heath Schuster  Encounter Date: 10/3/24     Have you been cared for by a [facility] Dermatologist in the last 3 years and, if so, which description applies to you?    Yes.  I have been here within the last 3 years, and my medical history has NOT changed since that time.  I am MALE/not capable of bearing children.    REVIEW OF SYSTEMS:  Have you recently had or currently have any of the following? No changes in my recent health.   PAST MEDICAL HISTORY:  Have you personally ever had or currently have any of the following?  If \"YES,\" then please provide more detail. No changes in my medical history.   HISTORY OF IMMUNOSUPPRESSION: Do you have a history of any of the following:  Systemic Immunosuppression such as Diabetes, Biologic or Immunotherapy, Chemotherapy, Organ Transplantation, Bone Marrow Transplantation or Prednisone?  YES, Type 2 Diabetes     Answering \"YES\" requires the addition of the dotphrase \"IMMUNOSUPPRESSED\" as the first diagnosis of the patient's visit.   FAMILY HISTORY:  Any \"first degree relatives\" (parent, brother, sister, or child) with the following?    No changes in my family's known health.   PATIENT EXPERIENCE:    Do you want the Dermatologist to perform a COMPLETE skin exam today including a clinical examination under the \"bra and underwear\" areas?  NO  If necessary, do we have your permission to call and leave a detailed message on your Preferred Phone number that includes your specific medical information?  Yes      Allergies   Allergen Reactions    Other Shortness Of Breath     Cats and dogs      Current Outpatient Medications:     atorvastatin (LIPITOR) 20 mg tablet, Take 20 mg by mouth daily, Disp: , Rfl:     lisinopril (ZESTRIL) 5 mg tablet, TAKE ONE TABLET BY MOUTH EVERY DAY FOR BLOOD PRESSURE/HEART, Disp: , Rfl:     metFORMIN (GLUCOPHAGE) 500 mg tablet, Take 1,000 mg by mouth, Disp: , Rfl:     metoprolol tartrate (LOPRESSOR) 25 " "mg tablet, Take 0.5 tablets by mouth 2 (two) times a day, Disp: , Rfl:     Multiple Vitamins-Minerals (CENTRUM ADULTS PO), Take 50 mg by mouth, Disp: , Rfl:     omeprazole (PriLOSEC) 20 mg delayed release capsule, , Disp: , Rfl:     apixaban (Eliquis) 5 mg, Take 1 tablet (5 mg total) by mouth 2 (two) times a day, Disp: 60 tablet, Rfl: 2          Whom besides the patient is providing clinical information about today's encounter?   NO ADDITIONAL HISTORIAN (patient alone provided history)    Physical Exam and Assessment/Plan by Diagnosis:    NEOPLASM OF UNCERTAIN BEHAVIOR OF SKIN    Physical Exam:  (Anatomic Location); (Size and Morphological Description); (Differential Diagnosis):  Right shoulder; 68 year old with 0.9 cm pink papule with hyperkeratotic core scaly papule; Rule out invasive squamous cell carcinoma     Specimen A; right shoulder; skin; shave biopsy; 68 years old with 0.9 cm pink papule with scaly hyperkeratotic core; Rule out: invasive squamous cell carcinoma   Pertinent Positives:  Pertinent Negatives:    Additional History of Present Condition:  Patient describes that three weeks ago he noticed a \"pimple-like  spot\" on his right shoulder. He states that the area is sore to the touch and mildly tender. No drainage, bleeding, or itch. No personal history of skin cancer. Older brother has a history of skin cancer.     Assessment and Plan:  I have discussed with the patient that a sample of skin via a \"skin biopsy” would be potentially helpful to further make a specific diagnosis under the microscope.  Based on a thorough discussion of this condition and the management approach to it (including a comprehensive discussion of the known risks, side effects and potential benefits of treatment), the patient (family) agrees to implement the following specific plan:    Procedure:  Skin Biopsy.  After a thorough discussion of treatment options and risk/benefits/alternatives (including but not limited to local " pain, scarring, dyspigmentation, blistering, possible superinfection, and inability to confirm a diagnosis via histopathology), verbal and written consent were obtained and portion of the rash was biopsied for tissue sample.  See below for consent that was obtained from patient and subsequent Procedure Note.     PROCEDURE TANGENTIAL (SHAVE) BIOPSY NOTE:    Performing Physician:  Dr. Rios  Anatomic Location; Clinical Description with size (cm); Pre-Op Diagnosis:   Right shoulder; 68 year old with 0.9 cm pink papule with hyperkeratotic core scaly papule; Rule out invasive squamous cell carcinoma     Specimen A; right shoulder; skin; shave biopsy; 68 years old with 0.9 cm pink papule with hyperkeratotic core scaly papule; Rule out: invasive squamous cell carcinoma   Post-op diagnosis: Same     Local anesthesia: 1% xylocaine with epi      Topical anesthesia: None    Hemostasis: Aluminum chloride       After obtaining informed consent  at which time there was a discussion about the purpose of biopsy  and low risks of infection and bleeding.  The area was prepped and draped in the usual fashion. Anesthesia was obtained with 1% lidocaine with epinephrine. A shave biopsy to an appropriate sampling depth was obtained by Shave (Dermablade or 15 blade) The resulting wound was covered with surgical ointment and bandaged appropriately.     The patient tolerated the procedure well without complications and was without signs of functional compromise.      Specimen has been sent for review by Dermatopathology.    Standard post-procedure care has been explained and has been included in written form within the patient's copy of Informed Consent.    INFORMED CONSENT DISCUSSION AND POST-OPERATIVE INSTRUCTIONS FOR PATIENT    I.  RATIONALE FOR PROCEDURE  I understand that a skin biopsy allows the Dermatologist to test a lesion or rash under the microscope to obtain a diagnosis.  It usually involves numbing the area with numbing  "medication and removing a small piece of skin; sometimes the area will be closed with sutures. In this specific procedure, sutures are not usually needed.  If any sutures are placed, then they are usually need to be removed in 2 weeks or less.    I understand that my Dermatologist recommends that a skin \"shave\" biopsy be performed today.  A local anesthetic, similar to the kind that a dentist uses when filling a cavity, will be injected with a very small needle into the skin area to be sampled.  The injected skin and tissue underneath \"will go to sleep” and become numb so no pain should be felt afterwards.  An instrument shaped like a tiny \"razor blade\" (shave biopsy instrument) will be used to cut a small piece of tissue and skin from the area so that a sample of tissue can be taken and examined more closely under the microscope.  A slight amount of bleeding will occur, but it will be stopped with direct pressure and a pressure bandage and any other appropriate methods.  I understands that a scar will form where the wound was created.  Surgical ointment will be applied to help protect the wound.  Sutures are not usually needed.    II.  RISKS AND POTENTIAL COMPLICATIONS   I understand the risks and potential complications of a skin biopsy include but are not limited to the following:  Bleeding  Infection  Pain  Scar/keloid  Skin discoloration  Incomplete Removal  Recurrence  Nerve Damage/Numbness/Loss of Function  Allergic Reaction to Anesthesia  Biopsies are diagnostic procedures and based on findings additional treatment or evaluation may be required  Loss or destruction of specimen resulting in no additional findings    My Dermatologist has explained to me the nature of the condition, the nature of the procedure, and the benefits to be reasonably expected compared with alternative approaches.  My Dermatologist has discussed the likelihood of major risks or complications of this procedure including the specific " "risks listed above, such as bleeding, infection, and scarring/keloid.  I understand that a scar is expected after this procedure.  I understand that my physician cannot predict if the scar will form a \"keloid,\" which extends beyond the borders of the wound that is created.  A keloid is a thick, painful, and bumpy scar.  A keloid can be difficult to treat, as it does not always respond well to therapy, which includes injecting cortisone directly into the keloid every few weeks.  While this usually reduces the pain and size of the scar, it does not eliminate it.      I understand that photographs may be taken before and after the procedure.  These will be maintained as part of the medical providers confidential records and may not be made available to me.  I further authorize the medical provider to use the photographs for teaching purposes or to illustrate scientific papers, books, or lectures if in his/her judgment, medical research, education, or science may benefit from its use.    I have had an opportunity to fully inquire about the risks and benefits of this procedure and its alternatives.   I have been given ample time and opportunity to ask questions and to seek a second opinion if I wished to do so.  I acknowledge that there have specifically been no guarantees as to the cosmetic results from the procedure.  I am aware that with any procedure there is always the possibility of an unexpected complication.    III. POST-PROCEDURAL CARE (WHAT YOU WILL NEED TO DO \"AFTER THE BIOPSY\" TO OPTIMIZE HEALING)    Keep the area clean and dry.  Try NOT to remove the bandage or get it wet for the first 24 hours.    Gently clean the area and apply surgical ointment (such as Vaseline petrolatum ointment, which is available \"over the counter\" and not a prescription) to the biopsy site for up to 2 weeks straight.  This acts to protect the wound from the outside world.      Sutures are not usually placed in this procedure.  If " any sutures were placed, return for suture removal as instructed (generally 1 week for the face, 2 weeks for the body).      Take Acetaminophen (Tylenol) for discomfort, if no contraindications.  Ibuprofen or aspirin could make bleeding worse.    Call our office immediately for signs of infection: fever, chills, increased redness, warmth, tenderness, discomfort/pain, or pus or foul smell coming from the wound.    WHAT TO DO IF THERE IS ANY BLEEDING?  If a small amount of bleeding is noticed, place a clean cloth over the area and apply firm pressure for ten minutes.  Check the wound after 10 minutes of direct pressure.  If bleeding persists, try one more time for an additional 10 minutes of direct pressure on the area.  If the bleeding becomes heavier or does not stop after the second attempt, or if you have any other questions about this procedure, then please call your [facility]'s Dermatologist by calling [phone] ([phone]).     I hereby acknowledge that I have reviewed and verified the site with my Dermatologist and have requested and authorized my Dermatologist to proceed with the procedure.           Scribe Attestation      I,:  Claritza Rodriguez am acting as a scribe while in the presence of the attending physician.:       I,:  Eleazar Rios MD personally performed the services described in this documentation    as scribed in my presence.:

## 2024-10-03 NOTE — PATIENT INSTRUCTIONS
"PROCEDURE TANGENTIAL (SHAVE) BIOPSY NOTE:  INFORMED CONSENT DISCUSSION AND POST-OPERATIVE INSTRUCTIONS FOR PATIENT    I.  RATIONALE FOR PROCEDURE  I understand that a skin biopsy allows the Dermatologist to test a lesion or rash under the microscope to obtain a diagnosis.  It usually involves numbing the area with numbing medication and removing a small piece of skin; sometimes the area will be closed with sutures. In this specific procedure, sutures are not usually needed.  If any sutures are placed, then they are usually need to be removed in 2 weeks or less.    I understand that my Dermatologist recommends that a skin \"shave\" biopsy be performed today.  A local anesthetic, similar to the kind that a dentist uses when filling a cavity, will be injected with a very small needle into the skin area to be sampled.  The injected skin and tissue underneath \"will go to sleep” and become numb so no pain should be felt afterwards.  An instrument shaped like a tiny \"razor blade\" (shave biopsy instrument) will be used to cut a small piece of tissue and skin from the area so that a sample of tissue can be taken and examined more closely under the microscope.  A slight amount of bleeding will occur, but it will be stopped with direct pressure and a pressure bandage and any other appropriate methods.  I understands that a scar will form where the wound was created.  Surgical ointment will be applied to help protect the wound.  Sutures are not usually needed.    II.  RISKS AND POTENTIAL COMPLICATIONS   I understand the risks and potential complications of a skin biopsy include but are not limited to the following:  Bleeding  Infection  Pain  Scar/keloid  Skin discoloration  Incomplete Removal  Recurrence  Nerve Damage/Numbness/Loss of Function  Allergic Reaction to Anesthesia  Biopsies are diagnostic procedures and based on findings additional treatment or evaluation may be required  Loss or destruction of specimen resulting in " "no additional findings    My Dermatologist has explained to me the nature of the condition, the nature of the procedure, and the benefits to be reasonably expected compared with alternative approaches.  My Dermatologist has discussed the likelihood of major risks or complications of this procedure including the specific risks listed above, such as bleeding, infection, and scarring/keloid.  I understand that a scar is expected after this procedure.  I understand that my physician cannot predict if the scar will form a \"keloid,\" which extends beyond the borders of the wound that is created.  A keloid is a thick, painful, and bumpy scar.  A keloid can be difficult to treat, as it does not always respond well to therapy, which includes injecting cortisone directly into the keloid every few weeks.  While this usually reduces the pain and size of the scar, it does not eliminate it.      I understand that photographs may be taken before and after the procedure.  These will be maintained as part of the medical providers confidential records and may not be made available to me.  I further authorize the medical provider to use the photographs for teaching purposes or to illustrate scientific papers, books, or lectures if in his/her judgment, medical research, education, or science may benefit from its use.    I have had an opportunity to fully inquire about the risks and benefits of this procedure and its alternatives.   I have been given ample time and opportunity to ask questions and to seek a second opinion if I wished to do so.  I acknowledge that there have specifically been no guarantees as to the cosmetic results from the procedure.  I am aware that with any procedure there is always the possibility of an unexpected complication.    III. POST-PROCEDURAL CARE (WHAT YOU WILL NEED TO DO \"AFTER THE BIOPSY\" TO OPTIMIZE HEALING)    Keep the area clean and dry.  Try NOT to remove the bandage or get it wet for the first 24 " "hours.    Gently clean the area and apply surgical ointment (such as Vaseline petrolatum ointment, which is available \"over the counter\" and not a prescription) to the biopsy site for up to 2 weeks straight.  This acts to protect the wound from the outside world.      Sutures are not usually placed in this procedure.  If any sutures were placed, return for suture removal as instructed (generally 1 week for the face, 2 weeks for the body).      Take Acetaminophen (Tylenol) for discomfort, if no contraindications.  Ibuprofen or aspirin could make bleeding worse.    Call our office immediately for signs of infection: fever, chills, increased redness, warmth, tenderness, discomfort/pain, or pus or foul smell coming from the wound.    WHAT TO DO IF THERE IS ANY BLEEDING?  If a small amount of bleeding is noticed, place a clean cloth over the area and apply firm pressure for ten minutes.  Check the wound after 10 minutes of direct pressure.  If bleeding persists, try one more time for an additional 10 minutes of direct pressure on the area.  If the bleeding becomes heavier or does not stop after the second attempt, or if you have any other questions about this procedure, then please call your [facility] Dermatologist by calling [phone] (SKIN).     I hereby acknowledge that I have reviewed and verified the site with my Dermatologist and have requested and authorized my Dermatologist to proceed with the procedure.  "

## 2024-10-07 PROCEDURE — 88305 TISSUE EXAM BY PATHOLOGIST: CPT | Performed by: STUDENT IN AN ORGANIZED HEALTH CARE EDUCATION/TRAINING PROGRAM

## 2024-10-07 NOTE — RESULT ENCOUNTER NOTE
DERMATOPATHOLOGY RESULT NOTE    Results reviewed by ordering physician.  Called patient to personally discuss results. Discussed results with patient.       Instructions for Clinical Derm Team:   (remember to route Result Note to appropriate staff):    Call patient and schedule for [facility]LE    Result & Plan by Specimen:    Specimen A: malignant  Plan: excision    Final Diagnosis  A. Skin, right shoulder, shave biopsy:    SQUAMOUS CELL CARCINOMA, WELL DIFFERENTIATED (KERATOACANTHOMA TYPE).

## 2024-10-08 ENCOUNTER — TELEPHONE (OUTPATIENT)
Dept: DERMATOLOGY | Facility: CLINIC | Age: 69
End: 2024-10-08

## 2024-10-08 NOTE — TELEPHONE ENCOUNTER
Patient called office back to schedule excision of right shoulder SCC.     I scheduled him on 10/22/2024 to be seen at 7:40am and gave him the [place] office address as well.

## 2024-10-08 NOTE — TELEPHONE ENCOUNTER
Called patient to schedule a procedure. Left a message for patient to call the office to make an appointment. Please schedule patient for [facility]LE of site A - Malignant SCC

## 2024-10-16 ENCOUNTER — OFFICE VISIT (OUTPATIENT)
Dept: DERMATOLOGY | Facility: CLINIC | Age: 69
End: 2024-10-16
Payer: MEDICARE

## 2024-10-16 VITALS — BODY MASS INDEX: 30.53 KG/M2 | TEMPERATURE: 96.7 F | WEIGHT: 258.6 LBS | HEIGHT: 77 IN

## 2024-10-16 DIAGNOSIS — L81.4 LENTIGO: ICD-10-CM

## 2024-10-16 DIAGNOSIS — L57.0 ACTINIC KERATOSIS: ICD-10-CM

## 2024-10-16 DIAGNOSIS — L82.1 SEBORRHEIC KERATOSIS: ICD-10-CM

## 2024-10-16 DIAGNOSIS — Z85.89 HISTORY OF SQUAMOUS CELL CARCINOMA: Primary | ICD-10-CM

## 2024-10-16 DIAGNOSIS — D48.5 NEOPLASM OF UNCERTAIN BEHAVIOR OF SKIN: ICD-10-CM

## 2024-10-16 PROCEDURE — 88305 TISSUE EXAM BY PATHOLOGIST: CPT | Performed by: STUDENT IN AN ORGANIZED HEALTH CARE EDUCATION/TRAINING PROGRAM

## 2024-10-16 PROCEDURE — 17000 DESTRUCT PREMALG LESION: CPT | Performed by: STUDENT IN AN ORGANIZED HEALTH CARE EDUCATION/TRAINING PROGRAM

## 2024-10-16 PROCEDURE — 99214 OFFICE O/P EST MOD 30 MIN: CPT | Performed by: STUDENT IN AN ORGANIZED HEALTH CARE EDUCATION/TRAINING PROGRAM

## 2024-10-16 PROCEDURE — 11102 TANGNTL BX SKIN SINGLE LES: CPT | Performed by: STUDENT IN AN ORGANIZED HEALTH CARE EDUCATION/TRAINING PROGRAM

## 2024-10-16 PROCEDURE — 17003 DESTRUCT PREMALG LES 2-14: CPT | Performed by: STUDENT IN AN ORGANIZED HEALTH CARE EDUCATION/TRAINING PROGRAM

## 2024-10-16 NOTE — PROGRESS NOTES
"[facility]atology [facility] Note     Patient Name: Heath Schuster  Encounter Date: 10/16/24     Have you been cared for by a [facility] Dermatologist in the last 3 years and, if so, which description applies to you?    Yes.  I have been here within the last 3 years, and my medical history has NOT changed since that time.  I am MALE/not capable of bearing children.    REVIEW OF SYSTEMS:  Have you recently had or currently have any of the following? No changes in my recent health.   PAST MEDICAL HISTORY:  Have you personally ever had or currently have any of the following?  If \"YES,\" then please provide more detail. No changes in my medical history.   HISTORY OF IMMUNOSUPPRESSION: Do you have a history of any of the following:  Systemic Immunosuppression such as Diabetes, Biologic or Immunotherapy, Chemotherapy, Organ Transplantation, Bone Marrow Transplantation or Prednisone?  YES, Type 2 diabetes     Answering \"YES\" requires the addition of the dotphrase \"IMMUNOSUPPRESSED\" as the first diagnosis of the patient's visit.   FAMILY HISTORY:  Any \"first degree relatives\" (parent, brother, sister, or child) with the following?    No changes in my family's known health.   PATIENT EXPERIENCE:    Do you want the Dermatologist to perform a COMPLETE skin exam today including a clinical examination under the \"bra and underwear\" areas?  Yes  If necessary, do we have your permission to call and leave a detailed message on your Preferred Phone number that includes your specific medical information?  Yes      Allergies   Allergen Reactions    Other Shortness Of Breath     Cats and dogs      Current Outpatient Medications:     apixaban (Eliquis) 5 mg, Take 1 tablet (5 mg total) by mouth 2 (two) times a day, Disp: 60 tablet, Rfl: 2    atorvastatin (LIPITOR) 20 mg tablet, Take 20 mg by mouth daily, Disp: , Rfl:     lisinopril (ZESTRIL) 5 mg tablet, TAKE ONE TABLET BY MOUTH EVERY DAY FOR BLOOD PRESSURE/HEART, Disp: , Rfl:     " metFORMIN (GLUCOPHAGE) 500 mg tablet, Take 1,000 mg by mouth, Disp: , Rfl:     metoprolol tartrate (LOPRESSOR) 25 mg tablet, Take 0.5 tablets by mouth 2 (two) times a day, Disp: , Rfl:     Multiple Vitamins-Minerals (CENTRUM ADULTS PO), Take 50 mg by mouth, Disp: , Rfl:     omeprazole (PriLOSEC) 20 mg delayed release capsule, , Disp: , Rfl:           Whom besides the patient is providing clinical information about today's encounter?   NO ADDITIONAL HISTORIAN (patient alone provided history)    Physical Exam and Assessment/Plan by Diagnosis:  HISTORY OF SQUAMOUS CELL CARCINOMA - KA type    Physical Exam:  Anatomic Location Affected:  right shoulder  Morphological Description of Scar:  well healed  Suspected Recurrence: no  Regional adenopathy: no    Additional History of Present Condition:  Excision done by Dr. Leatha Florian MD on 10/03/24    Assessment and Plan:  Based on a thorough discussion of this condition and the management approach to it (including a comprehensive discussion of the known risks, side effects and potential benefits of treatment), the patient (family) agrees to implement the following specific plan:  Excision was done     How can SCC be prevented?  The most important way to prevent SCC is to avoid sunburn. This is especially important in childhood and early life. Fair skinned individuals and those with a personal or family history of BCC should protect their skin from sun exposure daily, year-round and lifelong.  Stay indoors or under the shade in the middle of the day   Wear covering clothing   Apply high protection factor SPF50+ broad-spectrum sunscreens generously to exposed skin if outdoors   Avoid indoor tanning (sun beds, solaria)      What is the outlook for SCC?  Most SCC are cured by treatment. Cure is most likely if treatment is undertaken when the lesion is small. A small percent of SCC's can spread to lymph  nodes and long term monitoring is indicated.   They are also at increased  risk of other skin cancers, especially melanoma. Regular self-skin examinations and long-term annual skin checks by an experienced health professional are recommended.     SEBORRHEIC KERATOSES  - Relevant exam: Scattered over the trunk/extremities are waxy brown to black plaques and papules with stuck on appearance and consistent dermoscopy  - Exam and clinical history consistent with seborrheic keratoses  - Counseled that these are benign growths that do not require treatment    MELANOCYTIC NEVI  -Relevant exam: Scattered over the trunk/extremities are homogenously pigmented brown macules and papules. ELM performed and without concerning findings. No outliers unless otherwise noted in today's note  - Exam and clinical history consistent with melanocytic nevi  - Counseled to return to clinic prior to scheduled appointment should any of these lesions change or should any new lesions of concern arise  - Counseled on use of sun protection daily. Reviewed latest FDA sunscreen guidelines, including use of broad spectrum (UVA and UVB blocking) sunscreen or sun protective clothing with SPF 30-50 every 2-3 hours and reapplied after exposure to water    LENTIGINES  OTHER SKIN CHANGES DUE TO CHRONIC EXPOSURE TO NONIONIZING RADIATION  - Relevant exam: Over sun exposed areas are brown macules. ELM performed and without concerning findings.  - Exam and clinical history consistent with lentigines.  - Counseled to return to clinic prior to scheduled appointment should any of these lesions change or should any new lesions of concern arise.  - Recommended use of sunscreen as above and below.    CHERRY ANGIOMAS  - Relevant exam: Scattered over the trunk/extremities are red papules  - Exam and clinical history consistent with cherry angiomas  - Educated that these are benign    ACTINIC KERATOSES  - Relevant exam: On the right neck, left hand  are scaly pink macules without palpable dermal component    - Exam and clinical history  "consistent with actinic keratoses  - Discussed that these lesions are considered premalignant with the potential to evolve into squamous cell carcinoma.   - Discussed treatment options, which may inclue liquid nitrogen destruction, topical immunotherapy including risks, benefits  - Patient counseled to return to the office in 4-6 weeks after completion of treatment for recheck if not resolved at which time retreatment or biopsy to rule out SCC will be determined based on clinic findings      PROCEDURE:  DESTRUCTION OF PRE-MALIGNANT LESIONS    - After a thorough discussion of treatment options and risk/benefits/alternatives (including but not limited to local pain, scarring, dyspigmentation, blistering, and possible superinfection), verbal and written consent were obtained and the aforementioned lesions were treated on with cryotherapy using liquid nitrogen x 1 cycle for 5-10 seconds.    TOTAL NUMBER of 9 pre-malignant lesions were treated today on the ANATOMIC LOCATION: right neck, left hand .     The patient tolerated the procedure well, and after-care instructions were provided.    ACROCHORDON (\"SKIN TAG\")    Physical Exam:  Anatomic Location Affected:  right eyelid  Morphological Description:    Pertinent Positives:  Pertinent Negatives:    Assessment and Plan:  Based on a thorough discussion of this condition and the management approach to it (including a comprehensive discussion of the known risks, side effects and potential benefits of treatment), the patient (family) agrees to implement the following specific plan:  Reassured benign    Skin tags are common, soft, harmless skin lesions that are also called, in the appropriate settings, papillomas, fibroepithelial polyps, and soft fibromas.  They are made up of loosely arranged collagen fibers and blood vessels surrounded by a thickened or thinned-out epidermis.    Skin tags tend to develop in both men and women as we grow older.  They are usually found on the " "skin folds (neck, armpits, groin).  It is not known what specifically causes skin tags.  Certain factors, though, do appear to play a role:  Chaffing and irritation from skin rubbing together  High levels of growth factors (as seen, for example, in pregnancy or in acromegaly/gigantism)  Insulin resistance  Human papillomavirus (wart virus)    We discussed that most skin tags do not need to be treated unless they are specifically causing the patient physical distress or limitation or pose a risk for a larger problem such as an infection that forms secondary to excoriation or chronic irritation.    We had a thorough discussion of treatment options and specific risks (including that any procedural treatment may not be covered by insurance and would then be the patient's responsibility) and benefits/alternatives including but not limited to the following:  Cryotherapy (freezing)  Shave removal  Surgical excision (snip excision with scissors)  Electrosurgery  Ligation (we do not do this procedure and counseled against it due to risk of tissue necrosis and infection)    NEOPLASM OF UNCERTAIN BEHAVIOR OF SKIN    Physical Exam:  (Anatomic Location); (Size and Morphological Description); (Differential Diagnosis):  Specimen A: left occipital hair line; 0.8 cm x 0.7 cm pearly pink papule; Ddx: suspected Basal cell carcinoma     Pertinent Positives:  Pertinent Negatives:    Assessment and Plan:  I have discussed with the patient that a sample of skin via a \"skin biopsy” would be potentially helpful to further make a specific diagnosis under the microscope.  Based on a thorough discussion of this condition and the management approach to it (including a comprehensive discussion of the known risks, side effects and potential benefits of treatment), the patient (family) agrees to implement the following specific plan:    Procedure:  Skin Biopsy.  After a thorough discussion of treatment options and risk/benefits/alternatives " (including but not limited to local pain, scarring, dyspigmentation, blistering, possible superinfection, and inability to confirm a diagnosis via histopathology), verbal and written consent were obtained and portion of the rash was biopsied for tissue sample.  See below for consent that was obtained from patient and subsequent Procedure Note.  PROCEDURE TANGENTIAL (SHAVE) BIOPSY NOTE:    Performing Physician: Dr.McCoy Tapia supervising  Anatomic Location; Clinical Description with size (cm); Pre-Op Diagnosis:   Specimen A: left posterior hair line; 0.8 cm x 0.7 cm pearly pink papule; Ddx: suspected Basal cell carcinoma   Post-op diagnosis: Same     Local anesthesia: 1% xylocaine with epi      Topical anesthesia: None    Hemostasis: Aluminum chloride       After obtaining informed consent  at which time there was a discussion about the purpose of biopsy  and low risks of infection and bleeding.  The area was prepped and draped in the usual fashion. Anesthesia was obtained with 1% lidocaine with epinephrine. A shave biopsy to an appropriate sampling depth was obtained by Shave (Dermablade or 15 blade) The resulting wound was covered with surgical ointment and bandaged appropriately.     The patient tolerated the procedure well without complications and was without signs of functional compromise.      Specimen has been sent for review by Dermatopathology.    Standard post-procedure care has been explained and has been included in written form within the patient's copy of Informed Consent.                 Scribe Attestation      I,:  Catrina Metcalf MA am acting as a scribe while in the presence of the attending physician.:       I,:  Nataliya Tapia MD personally performed the services described in this documentation    as scribed in my presence.:           Andreea Ames  PGY4 Dermatology Resident    This encounter (54332 or [id number]) has a MODERATE level of medical decision making (MDM) given the presence of at least 2 of  the elements of MDM (below):  *MODERATE number and complexity of problems addressed: 1 or more chronic illnesses with exacerbation, progression, or side effects of treatment; OR 2 or more stable chronic illnesses; OR 1 undiagnosed new problem with uncertain prognosis; OR 1 acute illness with systemic symptoms; OR 1 acute uncomplicated injury  *MODERATE amount and/or complexity of data reviewed: this includes review of previous notes and/or lab tests, independent interpretation of tests performed by another healthcare professional, ordering tests, assessment requiring independent historian, or discussion with other healthcare professionals.  *MODERATE risk of complications and/or morbidity or mortality of patient management (for example, prescription drug management, decisions regarding minor surgery with identified patient or procedure risk factors).

## 2024-10-16 NOTE — PATIENT INSTRUCTIONS
"NFORMED CONSENT DISCUSSION AND POST-OPERATIVE INSTRUCTIONS FOR PATIENT    I.  RATIONALE FOR PROCEDURE  I understand that a skin biopsy allows the Dermatologist to test a lesion or rash under the microscope to obtain a diagnosis.  It usually involves numbing the area with numbing medication and removing a small piece of skin; sometimes the area will be closed with sutures. In this specific procedure, sutures are not usually needed.  If any sutures are placed, then they are usually need to be removed in 2 weeks or less.    I understand that my Dermatologist recommends that a skin \"shave\" biopsy be performed today.  A local anesthetic, similar to the kind that a dentist uses when filling a cavity, will be injected with a very small needle into the skin area to be sampled.  The injected skin and tissue underneath \"will go to sleep” and become numb so no pain should be felt afterwards.  An instrument shaped like a tiny \"razor blade\" (shave biopsy instrument) will be used to cut a small piece of tissue and skin from the area so that a sample of tissue can be taken and examined more closely under the microscope.  A slight amount of bleeding will occur, but it will be stopped with direct pressure and a pressure bandage and any other appropriate methods.  I understands that a scar will form where the wound was created.  Surgical ointment will be applied to help protect the wound.  Sutures are not usually needed.    II.  RISKS AND POTENTIAL COMPLICATIONS   I understand the risks and potential complications of a skin biopsy include but are not limited to the following:  Bleeding  Infection  Pain  Scar/keloid  Skin discoloration  Incomplete Removal  Recurrence  Nerve Damage/Numbness/Loss of Function  Allergic Reaction to Anesthesia  Biopsies are diagnostic procedures and based on findings additional treatment or evaluation may be required  Loss or destruction of specimen resulting in no additional findings    My Dermatologist " "has explained to me the nature of the condition, the nature of the procedure, and the benefits to be reasonably expected compared with alternative approaches.  My Dermatologist has discussed the likelihood of major risks or complications of this procedure including the specific risks listed above, such as bleeding, infection, and scarring/keloid.  I understand that a scar is expected after this procedure.  I understand that my physician cannot predict if the scar will form a \"keloid,\" which extends beyond the borders of the wound that is created.  A keloid is a thick, painful, and bumpy scar.  A keloid can be difficult to treat, as it does not always respond well to therapy, which includes injecting cortisone directly into the keloid every few weeks.  While this usually reduces the pain and size of the scar, it does not eliminate it.      I understand that photographs may be taken before and after the procedure.  These will be maintained as part of the medical providers confidential records and may not be made available to me.  I further authorize the medical provider to use the photographs for teaching purposes or to illustrate scientific papers, books, or lectures if in his/her judgment, medical research, education, or science may benefit from its use.    I have had an opportunity to fully inquire about the risks and benefits of this procedure and its alternatives.   I have been given ample time and opportunity to ask questions and to seek a second opinion if I wished to do so.  I acknowledge that there have specifically been no guarantees as to the cosmetic results from the procedure.  I am aware that with any procedure there is always the possibility of an unexpected complication.    III. POST-PROCEDURAL CARE (WHAT YOU WILL NEED TO DO \"AFTER THE BIOPSY\" TO OPTIMIZE HEALING)    Keep the area clean and dry.  Try NOT to remove the bandage or get it wet for the first 24 hours.    Gently clean the area and apply " "surgical ointment (such as Vaseline petrolatum ointment, which is available \"over the counter\" and not a prescription) to the biopsy site for up to 2 weeks straight.  This acts to protect the wound from the outside world.      Sutures are not usually placed in this procedure.  If any sutures were placed, return for suture removal as instructed (generally 1 week for the face, 2 weeks for the body).      Take Acetaminophen (Tylenol) for discomfort, if no contraindications.  Ibuprofen or aspirin could make bleeding worse.    Call our office immediately for signs of infection: fever, chills, increased redness, warmth, tenderness, discomfort/pain, or pus or foul smell coming from the wound.    WHAT TO DO IF THERE IS ANY BLEEDING?  If a small amount of bleeding is noticed, place a clean cloth over the area and apply firm pressure for ten minutes.  Check the wound after 10 minutes of direct pressure.  If bleeding persists, try one more time for an additional 10 minutes of direct pressure on the area.  If the bleeding becomes heavier or does not stop after the second attempt, or if you have any other questions about this procedure, then please call your [facility]'s Dermatologist by calling [phone] (SKIN).     I hereby acknowledge that I have reviewed and verified the site with my Dermatologist and have requested and authorized my Dermatologist to proceed with the procedure.  "

## 2024-10-21 DIAGNOSIS — C44.91 NODULAR BASAL CELL CARCINOMA (BCC): Primary | ICD-10-CM

## 2024-10-21 PROCEDURE — 88305 TISSUE EXAM BY PATHOLOGIST: CPT | Performed by: STUDENT IN AN ORGANIZED HEALTH CARE EDUCATION/TRAINING PROGRAM

## 2024-10-21 NOTE — RESULT ENCOUNTER NOTE
DERMATOPATHOLOGY RESULT NOTE    Results reviewed by ordering physician.  Called patient to personally discuss results. Discussed results with patient. Explained nature of nodular bcc. Pt has next FBSE on 4/2025 with Dr. Tapia      Instructions for Clinical Derm Team:   (remember to route Result Note to appropriate staff):    Call patient and schedule for Mohs    Result & Plan by Specimen:    Specimen A: malignant  Plan: MOHs    Tissue Exam: [id number]  Order: [id number]   Status: Final result      Visible to patient: Yes (not seen)      Dx: Neoplasm of uncertain behavior of skin    0 Result Notes     Component   Case Report  Surgical Pathology Report                         Case: [id number]                                  Authorizing Provider:  Andreea Ames MD            Collected:           10/16/2024 1138              Ordering Location:     [facility]      Received:            10/16/2024 [address] [place] [address]                                                                      Pathologist:           Nataliya Tapia MD                                                          Specimen:    Skin, Other, Specimen A: left occipital hair line                                        Final Diagnosis  A. Skin, left occipital hair line, shave biopsy:    BASAL CELL CARCINOMA (SUPERFICIAL AND NODULAR TYPE); transected.      Electronically signed by Nataliya Tapia MD on 10/21/2024 at 11:15 AM

## 2024-10-22 ENCOUNTER — TELEPHONE (OUTPATIENT)
Dept: DERMATOLOGY | Facility: CLINIC | Age: 69
End: 2024-10-22

## 2024-10-22 ENCOUNTER — PROCEDURE VISIT (OUTPATIENT)
Age: 69
End: 2024-10-22
Payer: MEDICARE

## 2024-10-22 VITALS — BODY MASS INDEX: 30.33 KG/M2 | WEIGHT: 255.8 LBS

## 2024-10-22 DIAGNOSIS — C44.622: Primary | ICD-10-CM

## 2024-10-22 PROCEDURE — 12032 INTMD RPR S/A/T/EXT 2.6-7.5: CPT | Performed by: REGISTERED NURSE

## 2024-10-22 PROCEDURE — 88341 IMHCHEM/IMCYTCHM EA ADD ANTB: CPT | Performed by: STUDENT IN AN ORGANIZED HEALTH CARE EDUCATION/TRAINING PROGRAM

## 2024-10-22 PROCEDURE — 88342 IMHCHEM/IMCYTCHM 1ST ANTB: CPT | Performed by: STUDENT IN AN ORGANIZED HEALTH CARE EDUCATION/TRAINING PROGRAM

## 2024-10-22 PROCEDURE — 88305 TISSUE EXAM BY PATHOLOGIST: CPT | Performed by: STUDENT IN AN ORGANIZED HEALTH CARE EDUCATION/TRAINING PROGRAM

## 2024-10-22 PROCEDURE — 11602 EXC TR-EXT MAL+MARG 1.1-2 CM: CPT | Performed by: REGISTERED NURSE

## 2024-10-22 NOTE — LETTER
Heath Schuster     1955    [address] [place] [address]    Dear Heath Schuster,    You are scheduled to have the MOHS procedure on February 10, 2025 at 9 am for hairline with Dr.Ryan Paredes. Our office is located in [facility] our address is [address] [place] [address]. Once you arrive please check in with our front staff in [address] and they will escort you to the MOHS waiting room.  If you have someone bringing you to your appointment they may wait in the waiting room or accompany you in your visit.      Below you will find some pre-op instructions along with some information regarding the MOHS procedure.     If you have any questions please call our office at [phone].       Thank you,    [facility]         PRE-OPERATIVE INSTRUCTIONS - MOHS    Before your scheduled surgery, there are a number of important precautions and positive steps you should take to help prepare yourself for a successful treatment and speedy recovery.    Some of the steps, which are listed below, may seem unnecessary and inconvenient, but they are important. For example, when you stop smoking, you increase your ability to heal. Occasionally, there may be valid reasons, personal or medical, why you can't comply. In such cases, please call the office so we can discuss possible ways to overcome any obstacles you may be encountering.    If you have any questions about the surgery, or remember additional medical information that you forgot to mention to our staff, please contact the office prior to your surgery.    GENERAL INFORMATION REGARDING MOHS MICROGRAPHIC SURGERY    Mohs surgery is a specialized technique for the removal of skin cancer developed by Dr. Frederick Mohs over 50 years ago to improve the cure rates of skin cancer. Traditionally, skin cancers are treated by destructive methods (radiation, freezing, scraping, and  burning) or excision (cutting out the tissue with standards margins and sending it to an outside laboratory for testing). These methods all yield cure rates between 65%-94%. However, for cancers located in cosmetically sensitive areas, large tumors, or tumors unsuccessfully treated by other means, Mohs surgery offers a higher cure rate. In most cases, Mohs surgery provides you with a 99% cure rate for primary (previously untreated) basal cell cancer and a 95% cure rate for primary squamous cell cancer. In Mohs surgery, tissue is removed and processed in a way that we are able to check 100% of the margins, giving the highest cure rate for any method of treating skin cancers while providing maximal preservation of normal skin. This allows the surgeon to produce an optimal cosmetic result for the patient by maximizing the amount of tissue removed yielding as small a scar as possible    On the day of surgery, you will be given local anesthesia only (similar to what was given to you during your initial biopsy). You will remain awake. You will verify the location of the skin cancer prior to the onset of the surgery. Once the area is numb, the tissue containing the skin cancer will be removed, taking a small safety margin. This margin is usually smaller than what would be taken with a standard excision. Once the tissue is removed, it is marked and oriented. The first layer (“Stage I”) will be processed in our laboratory. The wound will be treated for bleeding and a bandage will be placed to keep you comfortable while you wait an approximate 45 minutes-1 hour (for the processing of the tissue) in your room. Your Mohs surgeon will examine the pathology in the lab, checking all the margins. If any tumor remains, you will need to take a second layer of skin (“Stage 2”). The area will be re-anesthetized and your Mohs surgeon will remove more skin only in the area where the tumor exists. This process will continue until all the  skin cancer is removed. Unfortunately, there is no method to predict how many layers or stages will be taken.    Once the tumor has been removed completely, we will discuss the best ways to close the defect. Most wounds may be closed with stitches. A larger wound may require a skin graft or a flap. In rare instances, especially for cancers around the eye or for larger cancers, we may work with another surgeon (oculoplastic, ENT, plastics) with special skills to assist with reconstruction.  If the surgery is coordinated with another specialist, you will have the Mohs portion of the procedure first and see the coordinated specialist after the skin cancer has been removed.  Always follow the pre-operative instructions of the surgeon doing the closure.      Medications: Please take all your normal medications the morning of your surgery. If you are a diabetic, please bring your insulin or medications with you, as well as a snack to avoid having low blood sugar during your day with us.    1) Blood Thinners    VERY IMPORTANT: We do NOT stop or hold prescribed blood thinners (such as Coumadin/Warfarin, Plavix, Eliquis, Pradaxa, Brilinta, Apixaban, Xarelto, Lovonox, Rivaroxaban, or Aggrenox) before Mohs surgery. Additionally, If you take aspirin because you have had a stroke, heart attack, heart disease, other condition, or your physician has prescribed you to take it, please continue your aspirin.    Although there is a risk of increased bruising and bleeding, we are still able to safely perform surgery while continuing these medications. Please NEVER stop your prescribed blood thinner without the managing doctor's (the doctor that prescribed the medication) permission or knowledge. If you have any concerns about not holding your blood thinner, please address these with your Mohs surgeon.    Most people should stop all non-steroidal anti-inflammatory medications (Motrin, Naproxen, Advil, Midol, Aleve, etc.) for 7 days  prior to your scheduled surgery and 2 days after (unless instructed otherwise after surgery).  You may take Tylenol for pain.     Vitamins and Supplements  Avoid taking any supplements with Vitamin E, Fish Oil, Gingko, Ginseng, and Garlic for 2 weeks before and 2 days after your surgery. These thin your blood.    Lidocaine Patches  Avoid wearing any over the counter or prescribed Lidocaine patches the day of the surgery.    Alcohol: Avoid drinking alcohol for 2 days prior to your surgery, and for 2 days afterwards (it thins the blood and causes more bruising and swelling).    Smoking: Try to STOP or reduce smoking significantly the week before your surgery, and especially the week afterwards (it greatly improves how well you heal). Tobacco smoke deprives the blood of oxygen, which is urgently needed by the wound during the healing process.    Contact Lenses: Do not wear them on the day of the surgery. Instead, wear glasses and bring your case, in case we need to remove them.    Clothing: Do not wear your nicest clothing on your surgery day. We recommend wearing a button down shirt that will not disrupt your post-operative dressing when changing later that night. Please avoid wearing jeans during the procedure to help prevent damage to our equipment.     Bathing: On the morning of your surgery, you may bathe or shower normally. If you get your hair done on a weekly basis, remember to get your hair washed the day before surgery.   - You will need to keep your surgical site dry for a minimum of 48 hours after surgery.    Makeup: If your surgery is on the face, please do not wear any makeup on the day of the surgery.    Jewelry: Please try to avoid wearing jewelry on the day of surgery.    Food: On the morning of surgery, have breakfast but limit your intake of caffeinated beverages. They are diuretic and may inconvenience you during surgery. If you are following up with another surgeon the same day as your Mohs  surgery, you must receive permission to eat breakfast from that surgeon.      What to bring with you on the day of your surgery:  Bring snacks - Since you could be at the office long, you may bring snacks and/or lunch with you. Some snacks and drinks are available at the office as well.   Bring a sweater - Bring a sweater or jacket that buttons or zips down the front and will not disturb your wound dressing during removal.  Bring something to do - You will be spending much of the day in our office. There will be 45-60 minute waiting periods  between layers/stages, and while there is a television with cable in every room, it is nice to have something to keep you occupied such as books, magazines, knitting, music, or work.     Planning Ahead:  Other Appointments - It is important to realize that no matter how small the skin cancer appears to be, looks can be deceiving. Since your surgery may last the entire day, you should not schedule any other appointments that day.  Special Occasions - Surgery often creates swelling and bruising. Also, the post-op dressing may be rather large and obvious. Keep this in mind as you arrange your social/work schedule. If an important event is already planned, please check with your referring physician or your Mohs surgeon to see if the surgery can be postponed.  Activity Limits after Surgery - If surgery was performed on your face, we recommend that you keep your activity level to a minimum for 2-3 days (the blood pressure elevation related to exercise can lead to bleeding). If you have stitches in an area that will be under tension or significant movement (neck, back, arms, legs), you will need to avoid heavy lifting (anything over 5 lbs) or exercise for at least 2 weeks and possibly longer. We also advise that you limit out of town travel for the first 7 days after surgery. You should also wait at least 7 days before going into a pool or the ocean.  Housework - Since you will need to  minimize activity after surgery, plan to do your groceries, laundry, gardening, and other heavy household chores prior to your surgery. Please make arrangements for assistance during the post-op period. If surgery is around your mouth area, you may need to eat soft foods, such as soup, milkshakes, or yogurt for 48 hours.    Purchasing bandage supplies: Prior to surgery, please purchase the following items to care for your surgical wound properly.  Cotton swabs (Q-tips)  Vaseline or Aquaphor  Telfa pads (or any non-stick dressing)  Paper tape or Hypafix tape  Gauze pads (3x3)    Transportation: It is often reassuring and comforting to have a  drive you to and from the surgery. He or she is welcome to wait in the office during the surgery. If you do not have a , you may drive to and from your procedure (unless stated otherwise). If the site being treated is near your eye, be aware that the final bandage may cause some obstruction of vision.     Rescheduling: If you need to reschedule your surgery, please notify the office as soon as possible.

## 2024-10-22 NOTE — TELEPHONE ENCOUNTER
Pre- operative Mohs Telephone Scheduling Note    Do you have a pacemaker, defibrillator, spinal or brain stimulator? no    Do you take antibiotics before skin or dental procedures? no  If yes, will likely require pre-operative antibiotics. Ask  the patient why they take the antibiotics (usually because of joint replacement).    Do you have a history of a joint replacements within the past 2 years? no   If yes, will likely require pre-operative antibiotics. Ask if orthopaedic surgeon has prescribed pre-operative antibiotics to take before procedures/dental work?    Do you take any OTC medications that thin your blood (Aspirin, Aleve, Ibuprofen) or supplements that thin your blood (fish oil, garlic, vitamin E, Ginko Biloba)? no    Do you take any prescribed medications that thin your blood (Coumadin, Plavix, Xarelto, Eliquis or another prescribed blood thinner)? yes: eliquis    Do you have an allergy to lidocaine or epinephrine? no    Do you have allergies to Iodine? no    Do you wear a lidocaine patch? no    Have you ever been diagnosed with HIV, AIDS, Hep B and Hep C? no    Do you use a cane, walker or wheelchair? no    Is the patient from a nursing home? no If yes, Is there any special accommodations that is needed for patient n/a    Do you smoke? no      If yes,  patient to try and stop 2 days before surgery and 7 days after the surgery. Minimizing smoking as much as possible during this time will improve healing and the cosmetic result after surgery.    Do you use supplemental oxygen? If so, how many liters and can you be off it for a short period of time? N/a    Date scheduled: 2/10 at 9 with Dr. Paredes    Coordination of Care with other provider (Oculoplastics, Plastics, ENT) required? no   IF YES, PLEASE FORWARD TO APPROPRIATE PERSONNEL TO HELP COORDINATE.    Are there remaining tumors to be scheduled? no    Was Prior Authorization obtained? No (please use [email or web address] to document prior auth)

## 2024-10-22 NOTE — PROGRESS NOTES
"[facility] Note     Patient Name: Heath Schuster  Encounter Date: 10/22/24     Have you been cared for by a [facility] Dermatologist in the last 3 years and, if so, which description applies to you?    Yes.  I have been here within the last 3 years, and my medical history has NOT changed since that time.  I am MALE/not capable of bearing children.    REVIEW OF SYSTEMS:  Have you recently had or currently have any of the following? No changes in my recent health.   PAST MEDICAL HISTORY:  Have you personally ever had or currently have any of the following?  If \"YES,\" then please provide more detail. No changes in my medical history.   HISTORY OF IMMUNOSUPPRESSION: Do you have a history of any of the following:  Systemic Immunosuppression such as Diabetes, Biologic or Immunotherapy, Chemotherapy, Organ Transplantation, Bone Marrow Transplantation or Prednisone?  YES, type II diabetes     Answering \"YES\" requires the addition of the dotphrase \"IMMUNOSUPPRESSED\" as the first diagnosis of the patient's visit.   FAMILY HISTORY:  Any \"first degree relatives\" (parent, brother, sister, or child) with the following?    No changes in my family's known health.   PATIENT EXPERIENCE:    Do you want the Dermatologist to perform a COMPLETE skin exam today including a clinical examination under the \"bra and underwear\" areas?  NO  If necessary, do we have your permission to call and leave a detailed message on your Preferred Phone number that includes your specific medical information?  Yes      Allergies   Allergen Reactions    Other Shortness Of Breath     Cats and dogs      Current Outpatient Medications:     apixaban (Eliquis) 5 mg, Take 1 tablet (5 mg total) by mouth 2 (two) times a day, Disp: 60 tablet, Rfl: 2    atorvastatin (LIPITOR) 20 mg tablet, Take 20 mg by mouth daily, Disp: , Rfl:     lisinopril (ZESTRIL) 5 mg tablet, TAKE ONE TABLET BY MOUTH EVERY DAY FOR BLOOD PRESSURE/HEART, Disp: , Rfl:     " "metFORMIN (GLUCOPHAGE) 500 mg tablet, Take 1,000 mg by mouth, Disp: , Rfl:     metoprolol tartrate (LOPRESSOR) 25 mg tablet, Take 0.5 tablets by mouth 2 (two) times a day, Disp: , Rfl:     Multiple Vitamins-Minerals (CENTRUM ADULTS PO), Take 50 mg by mouth, Disp: , Rfl:     omeprazole (PriLOSEC) 20 mg delayed release capsule, , Disp: , Rfl:           Whom besides the patient is providing clinical information about today's encounter?   NO ADDITIONAL HISTORIAN (patient alone provided history)    Physical Exam and Assessment/Plan by Diagnosis:        PROCEDURE:  EXCISION NOTE     Procedural Plan:     Attending:  Dr. iRos  Assistant:  Jaclyn WALSH  Lesion Anatomic Location (use description from previous biopsy if applicable): right shoulder  Pre-Op Diagnosis: squamous cell carcinoma, well differentiated (keratoacanthoma type)  Lesion is being treated as \"benign\" or \"MALIGNANT\": MALIGNANT; final PATHOLOGICAL STAGING (use \"N/A\" if not reported in Path Report) :  N/A  Accession Number of any associated previous biopsy/excision: [id number]     Written and verbal (witnessed) informed consent was obtained. We discussed that \"excision\" is a method of removing lesions, both benign and malignant lesions.  A portion of normal skin is often taken to ensure completeness of removal.  I reviewed that this procedure will include numbing the area, cutting around and under the skin lesion, undermining (\"freeing up\") surrounding tissue, and closing the wound with sutures (stitches) both inside and out.  Risks include and are not limited to the following:  Bleeding, pain, infection, scarring, recurrence, more required treatment, no additional information, numbness and/or loss of function (if nerves are damaged).  These risks were considered against the benefits that we discussed, and the patient opted to continue with the procedure. It was discussed with patient that every effort is made to minimize scarring, but scarring is " "influenced also by extrinsic factor such as location, age and genetics.     Procedural Time Out:      Correct patient? yes  Correct site per Clinic Report? yes  Correct site per previous Path Report? yes  Correct site per Patient's recollection? yes    Anesthesia:      Local anesthesia: 1% xylocaine with epi     Excision Description:      Post-Op Diagnosis: Same as Pre-Op Diagnosis (above)  Pre-op Size: 1.1 cm  Margins (enter \"0\" for lipoma/cyst or similar diagnosis): 0.4 cm  TOTAL POSTOPERATIVE DEFECT SIZE (I.e., Pre-op Size + Margins on both sides):  1.9 cm    The patient was seated in the procedure/exam room, anesthetized locally, prepped and draped in the usual fashion. Using a #15 blade with a scalpel, the lesion was excised in elliptical fashion.     REQUIRED Lopez MELANOMA DATA      This procedure was not performed to treat primary cutaneous melanoma through wide local excision      Closure Description:      The specific type of closure that was utilized:     INTERMEDIATE Closure  INTERMEDIATE CLOSURE     The patient was brought back into the procedure room, anesthetized locally, prepped and draped in the usual fashion. Using a #15 blade with a scalpel, the lesion was excised in elliptical fashion. The wound was  undermined in the fascial plane.  Purpose of undermining was to decrease wound tension and facilitate closure. Hemostasis was achieved with light electrocoagulation.    The wound was closed with subcutaneous sutures as follows:    Deep Suture Throw, Size, and Type (select all that apply):  Interrupted Deeps; 3-0; monocryl     Epidermal edge closure was accomplished with superficial sutures as follows:    Superficial Suture Throw, Size and Type (select all that apply):  Interrupted Horizontal Mattress; 3-0; Prolene    FINAL LENGTH OF CLOSURE (please enter a length even for lipoma/cyst or similar diagnosis): 6.2 cm       Postoperative Care:      The wound was cleaned with sterile saline, dried off, " "surgical vaseline ointment was applied, and the wound was covered. A pressure dressing was applied for stabilization and light pressure.    Estimated blood loss:  Less than 3ml.  Complications: none  Post-op medications: none  Additional notes: none    Discharge Plans:      Discharge plans: Plan for return to us for suture removal, as scheduled in 14 days.   Patient condition at discharge: STABLE    The patient was given detailed oral and written instructions on postoperative care as detailed in consent. We urged the patient to call us if any problems or question should arise.         Please complete this section and then \"cut and paste\" it into the Patient Instructions section.  These notes should be printed and shared directly with the patient for review PRIOR TO leaving our office.         YOUR \"AFTER SURGERY\" REVIEW & INSTRUCTIONS    What to Know About Your Procedure  An \"excision\" was performed today to allow the dermatologist to remove a skin lesion. The procedure involves a local numbing medication and removing the entire lesion (or as much as possible). Typically, the lesion is being removed because it does not look \"normal,\" because it is becoming irritated and traumatized, or for significant appearance reasons.  The skin was cut deeply and then repaired - usually with sutures (stitches).  The removed tissue has been sent to the pathologist who will confirm the diagnosis and verify if the lesion has been completely removed.  Surgical “Vaseline-type” ointment has been applied after the procedure to help create a barrier between your wound and the outside world.     The advantage of using sutures (stitches) to repair a skin excision is that it allows the lesion to heal as quickly as possible with the least amount of scarring and risk of infection, Still, there are some risks and potential complications that you should watch out for that include but are not limited to the following:    Some bleeding is normal " "at the time of procedure and some bleeding on the gauze bandage after the procedure is normal for the first few days after surgery.  Profuse bleeding or bleeding with swelling and pain is NOT normal and should be reported as detailed below.  Infection is uncommon after skin surgery.  Infection should be reported and is indicated by pain, redness, and discharge of purulent material.  Some pain may occur initially the day after surgery.  Persistent pain or increasing pain days after surgery is not expected and should be reported.  Every effort is made to minimize scar, but location, size, and genetics do play a role in scar appearance.  A surgical wound does not achieve its optimal appearance until 6 months.  There are several treatments available if scarring would be problematic including scar creams, silicone pad, laser and scar revision.  Skin discoloration can occur especially in people of color.  Its important to avoid sun on wound in first 6 months after surgery.  Treatment is available if pigment is problematic.  Incomplete removal of the lesion or recurrence of lesion can occur and - depending on the lesion - this would then require further treatment and more surgery.  Nerve damage/numbness and/or loss of function is very rare, but is most likely to occur if the lesion being removed is large or if it is in a \"high risk\" location.  Allergic reaction to lidocaine is rare.  More commonly, epinephrine is used with the lidocaine, and, occasionally, epinephrine (a.k.a., adrenalin) may cause a brief feeling of anxiety or jitteriness.  The person at the microscope (pathologist) may provide additional information that was unexpected. This unexpected finding could prompt the need for additional treatment or evaluation.    At-Home Wound Care  Try NOT to remove the pressure bandage for 48 hours. Keep the area clean and dry while this bandage is on.   After removing the bandage for the first time, gently clean the area " "with soap and water. If the bandage is difficult to remove, getting the bandage wet in the shower will sometimes help soften the adhesive and allow it to be removed more easily.   You will now need to cleanse this area daily in the shower with gentle soap. There is no need to scrub the area. You will need to apply plain Vaseline ointment (this is over the counter and not a prescription) to the site for up to 2 weeks followed by a clean appropriately sized bandage to area.  Non stick dressing and paper tape (or Hypafix) are recommended for sensitive skin but a bandaid is fine if it covers the area well.  In general, sutures (stitches) are removed in about 5-7 days for face wounds and in about 12-14 days for the body wounds.  Your dermatologist wants you to return for suture removal in 14 DAYS.     General Restrictions  For TWO (2) DAYS:  You will need to take it very easy as this time is highest risk for bleeding. Being a \"couch potato\" during these two days is generally recommended.   For surgeries on the face/neck/scalp: Avoid leaning down to pick things up off the floor as this brings blood up to your head. Instead, squat down to pick things up.     For TWO WEEKS (14 DAYS):   No heavy lifting (anything greater than 10 pounds)   You can start to do slow, gentle activities such as slow walking but nothing to increase your heart rate and blood pressure too much (such as cardiovascular exercise).  It is important to take it easy as there is still a risk for bleeding and a high risk popping of stitches open during this time.     Site Specific Restrictions  If we did surgery near your eyes (including the nose, forehead, front part of your scalp, cheeks): It is VERY common to get a large amount of swelling around your eyes (puffy eyes). Although less frequent, this can be enough to swell your eyes shut and can also come along with bruising. This should not hurt and is very expected and normal. It is typically worst at ~ " "3 days out from your surgery and dramatically better 1 week post-operatively.   If we did surgery around your nose: No blowing your nose as this puts you at higher risk of popping stitches durign this time. Instead dab under your nose with a tissue or use a Q-tip inside your nose.  If we did surgery on the skin above or below your lip or your lip itself: No sipping from straws as this uses a lot of the muscles around your mouth and increases the risk of popping stitches during this time.    Managing Your Pain After Surgery  You can expect to have some pain after surgery. This is normal. The pain is typically worse the first two days after surgery, and quickly begins to get better.   You can use heating pads or ice packs on your incisions to help reduce your pain.   The best strategy for controlling your pain after surgery is \"around the clock\" pain control. You can take \"over-the-counter\" (non-prescription) Acetaminophen (Tylenol) for discomfort, unless you have been told not to by your physician.  If you are taking Tylenol at the maximum dose, you can alternate Tylenol with Advil/Motrin (ibuprofen) as long as there are no contraindications.  Alternating these medications with each other (I.e., Tylenol followed by Motrin/Advil) allows you to maximize your pain control.  To alternate these medications properly, you will take a dose of pain medication every three hours, alternating Tylenol (acetaminophen) and Advil/Motrin (ibuprofen).  Start by taking 650 mg of Tylenol (2 pills of 325 mg)  3 hours later take 600 mg of Motrin (3 pills of 200 mg)  3 hours after taking the Motrin take 650 mg of Tylenol  3 hours after that take 600 mg of Motrin.    As an example, if your first dose of Tylenol (acetaminophen) is at 12:00 PM, then you would alternate with Motrin as directed below, continuing usually for no more than a total of 48 hours straight:     12:00 PM  Tylenol 650 mg (2 pills of 325 mg)    3:00 PM  Motrin 600 mg (3 " "pills of 200 mg)    6:00 PM  Tylenol 650 mg (2 pills of 325 mg)    9:00 PM  Motrin 600 mg (3 pills of 200 mg)      WARNING:  Do NOT take more than 4000 mg of Tylenol or 3200 mg of Motrin in a \"24-hour\" period.       What if you still have pain?    If you have pain that is not controlled with the over-the-counter pain medications (Tylenol and Motrin/Advil), do not hesitate to call our staff using the number provided. We will help make sure you are managing your pain in the best way possible, and if necessary, we can provide a prescription for additional pain medication.     Call our office IMMEDIATELY with any signs of wound infection.  This includes fever, chills, increasing redness, warmth, tenderness, severe discomfort/pain, or pus or foul smell coming from the wound. [facility] can be directly at [phone] (SKIN) and ask for the on-call Dermatologist covering surgery/Mohs.    If Bleeding is Noticed  If bleeding is soaking through the bandage, remove the bandage and see where the bleeding is coming from.  Place a clean cloth over the area and apply firm pressure directly to the area that is bleeding for thirty minutes.    Check the wound ONLY after 30 minutes of direct pressure; do not cheat and sneak a peak, as that does not count (i.e., resets the clock back to zero).  If bleeding persists after 30 minutes of legitimate direct pressure, then try one more round of direct pressure to the area.    Should bleeding become heavier or not stop after the second application of direct pressure for 30 minutes, then call [facility] directly at [phone] (SKIN) and ask to speak with the on-call Dermatologist covering surgery/Mohs.  If after hours, go to your nearest Emergency Room or Urgent Care and have the team call [facility] directly at [phone] (SKIN); you will be connected to our after hours team.           Scribe Attestation      I,:  Jaclyn Watts am acting as a " scribe while in the presence of the attending physician.:       I,:  Eleazar Rios MD personally performed the services described in this documentation    as scribed in my presence.:

## 2024-10-22 NOTE — PATIENT INSTRUCTIONS
"YOUR \"AFTER SURGERY\" REVIEW & INSTRUCTIONS    What to Know About Your Procedure  An \"excision\" was performed today to allow the dermatologist to remove a skin lesion. The procedure involves a local numbing medication and removing the entire lesion (or as much as possible). Typically, the lesion is being removed because it does not look \"normal,\" because it is becoming irritated and traumatized, or for significant appearance reasons.  The skin was cut deeply and then repaired - usually with sutures (stitches).  The removed tissue has been sent to the pathologist who will confirm the diagnosis and verify if the lesion has been completely removed.  Surgical “Vaseline-type” ointment has been applied after the procedure to help create a barrier between your wound and the outside world.     The advantage of using sutures (stitches) to repair a skin excision is that it allows the lesion to heal as quickly as possible with the least amount of scarring and risk of infection, Still, there are some risks and potential complications that you should watch out for that include but are not limited to the following:    Some bleeding is normal at the time of procedure and some bleeding on the gauze bandage after the procedure is normal for the first few days after surgery.  Profuse bleeding or bleeding with swelling and pain is NOT normal and should be reported as detailed below.  Infection is uncommon after skin surgery.  Infection should be reported and is indicated by pain, redness, and discharge of purulent material.  Some pain may occur initially the day after surgery.  Persistent pain or increasing pain days after surgery is not expected and should be reported.  Every effort is made to minimize scar, but location, size, and genetics do play a role in scar appearance.  A surgical wound does not achieve its optimal appearance until 6 months.  There are several treatments available if scarring would be problematic including scar " "creams, silicone pad, laser and scar revision.  Skin discoloration can occur especially in people of color.  Its important to avoid sun on wound in first 6 months after surgery.  Treatment is available if pigment is problematic.  Incomplete removal of the lesion or recurrence of lesion can occur and - depending on the lesion - this would then require further treatment and more surgery.  Nerve damage/numbness and/or loss of function is very rare, but is most likely to occur if the lesion being removed is large or if it is in a \"high risk\" location.  Allergic reaction to lidocaine is rare.  More commonly, epinephrine is used with the lidocaine, and, occasionally, epinephrine (a.k.a., adrenalin) may cause a brief feeling of anxiety or jitteriness.  The person at the microscope (pathologist) may provide additional information that was unexpected. This unexpected finding could prompt the need for additional treatment or evaluation.    At-Home Wound Care  Try NOT to remove the pressure bandage for 48 hours. Keep the area clean and dry while this bandage is on.   After removing the bandage for the first time, gently clean the area with soap and water. If the bandage is difficult to remove, getting the bandage wet in the shower will sometimes help soften the adhesive and allow it to be removed more easily.   You will now need to cleanse this area daily in the shower with gentle soap. There is no need to scrub the area. You will need to apply plain Vaseline ointment (this is over the counter and not a prescription) to the site for up to 2 weeks followed by a clean appropriately sized bandage to area.  Non stick dressing and paper tape (or Hypafix) are recommended for sensitive skin but a bandaid is fine if it covers the area well.  In general, sutures (stitches) are removed in about 5-7 days for face wounds and in about 12-14 days for the body wounds.  Your dermatologist wants you to return for suture removal in 14 DAYS. " "    General Restrictions  For TWO (2) DAYS:  You will need to take it very easy as this time is highest risk for bleeding. Being a \"couch potato\" during these two days is generally recommended.   For surgeries on the face/neck/scalp: Avoid leaning down to pick things up off the floor as this brings blood up to your head. Instead, squat down to pick things up.     For TWO WEEKS (14 DAYS):   No heavy lifting (anything greater than 10 pounds)   You can start to do slow, gentle activities such as slow walking but nothing to increase your heart rate and blood pressure too much (such as cardiovascular exercise).  It is important to take it easy as there is still a risk for bleeding and a high risk popping of stitches open during this time.     Site Specific Restrictions  If we did surgery near your eyes (including the nose, forehead, front part of your scalp, cheeks): It is VERY common to get a large amount of swelling around your eyes (puffy eyes). Although less frequent, this can be enough to swell your eyes shut and can also come along with bruising. This should not hurt and is very expected and normal. It is typically worst at ~ 3 days out from your surgery and dramatically better 1 week post-operatively.   If we did surgery around your nose: No blowing your nose as this puts you at higher risk of popping stitches durign this time. Instead dab under your nose with a tissue or use a Q-tip inside your nose.  If we did surgery on the skin above or below your lip or your lip itself: No sipping from straws as this uses a lot of the muscles around your mouth and increases the risk of popping stitches during this time.    Managing Your Pain After Surgery  You can expect to have some pain after surgery. This is normal. The pain is typically worse the first two days after surgery, and quickly begins to get better.   You can use heating pads or ice packs on your incisions to help reduce your pain.   The best strategy for " "controlling your pain after surgery is \"around the clock\" pain control. You can take \"over-the-counter\" (non-prescription) Acetaminophen (Tylenol) for discomfort, unless you have been told not to by your physician.  If you are taking Tylenol at the maximum dose, you can alternate Tylenol with Advil/Motrin (ibuprofen) as long as there are no contraindications.  Alternating these medications with each other (I.e., Tylenol followed by Motrin/Advil) allows you to maximize your pain control.  To alternate these medications properly, you will take a dose of pain medication every three hours, alternating Tylenol (acetaminophen) and Advil/Motrin (ibuprofen).  Start by taking 650 mg of Tylenol (2 pills of 325 mg)  3 hours later take 600 mg of Motrin (3 pills of 200 mg)  3 hours after taking the Motrin take 650 mg of Tylenol  3 hours after that take 600 mg of Motrin.    As an example, if your first dose of Tylenol (acetaminophen) is at 12:00 PM, then you would alternate with Motrin as directed below, continuing usually for no more than a total of 48 hours straight:     12:00 PM  Tylenol 650 mg (2 pills of 325 mg)    3:00 PM  Motrin 600 mg (3 pills of 200 mg)    6:00 PM  Tylenol 650 mg (2 pills of 325 mg)    9:00 PM  Motrin 600 mg (3 pills of 200 mg)      WARNING:  Do NOT take more than 4000 mg of Tylenol or 3200 mg of Motrin in a \"24-hour\" period.       What if you still have pain?    If you have pain that is not controlled with the over-the-counter pain medications (Tylenol and Motrin/Advil), do not hesitate to call our staff using the number provided. We will help make sure you are managing your pain in the best way possible, and if necessary, we can provide a prescription for additional pain medication.     Call our office IMMEDIATELY with any signs of wound infection.  This includes fever, chills, increasing redness, warmth, tenderness, severe discomfort/pain, or pus or foul smell coming from the wound. [facility] can be directly at [phone] (SKIN) and ask for the on-call Dermatologist covering surgery/Mohs.    If Bleeding is Noticed  If bleeding is soaking through the bandage, remove the bandage and see where the bleeding is coming from.  Place a clean cloth over the area and apply firm pressure directly to the area that is bleeding for thirty minutes.    Check the wound ONLY after 30 minutes of direct pressure; do not cheat and sneak a peak, as that does not count (i.e., resets the clock back to zero).  If bleeding persists after 30 minutes of legitimate direct pressure, then try one more round of direct pressure to the area.    Should bleeding become heavier or not stop after the second application of direct pressure for 30 minutes, then call [facility] directly at [phone] (SKIN) and ask to speak with the on-call Dermatologist covering surgery/Mohs.  If after hours, go to your nearest Emergency Room or Urgent Care and have the team call [facility] directly at [phone] (SKIN); you will be connected to our after hours team

## 2024-10-28 PROCEDURE — 88305 TISSUE EXAM BY PATHOLOGIST: CPT | Performed by: STUDENT IN AN ORGANIZED HEALTH CARE EDUCATION/TRAINING PROGRAM

## 2024-10-28 PROCEDURE — 88341 IMHCHEM/IMCYTCHM EA ADD ANTB: CPT | Performed by: STUDENT IN AN ORGANIZED HEALTH CARE EDUCATION/TRAINING PROGRAM

## 2024-10-28 PROCEDURE — 88342 IMHCHEM/IMCYTCHM 1ST ANTB: CPT | Performed by: STUDENT IN AN ORGANIZED HEALTH CARE EDUCATION/TRAINING PROGRAM

## 2024-10-29 NOTE — RESULT ENCOUNTER NOTE
DERMATOPATHOLOGY RESULT NOTE    Results reviewed by ordering physician.  Called patient to personally discuss results. Discussed results with patient.       Instructions for Clinical Derm Team:   (remember to route Result Note to appropriate staff):    None    Result & Plan by Specimen:    Specimen A: No residual SCC   Plan: No further treatment needed.       A. Skin, right shoulder, excision:    -Prior procedure site changes present.    -Residual squamous cell carcinoma not seen.    -Incidental hypertrophic actinic keratosis.      Electronically signed by Nataliya Tapia MD on 10/28/2024 at  6:08 PM

## 2024-11-05 ENCOUNTER — CLINICAL SUPPORT (OUTPATIENT)
Dept: DERMATOLOGY | Facility: CLINIC | Age: 69
End: 2024-11-05

## 2024-11-05 DIAGNOSIS — B99.9 INFECTION: Primary | ICD-10-CM

## 2024-11-05 DIAGNOSIS — Z48.02 VISIT FOR SUTURE REMOVAL: Primary | ICD-10-CM

## 2024-11-05 PROCEDURE — RECHECK

## 2024-11-05 RX ORDER — DOXYCYCLINE 100 MG/1
100 CAPSULE ORAL 2 TIMES DAILY
Qty: 20 CAPSULE | Refills: 0 | Status: SHIPPED | OUTPATIENT
Start: 2024-11-05 | End: 2024-11-07

## 2024-11-05 NOTE — PROGRESS NOTES
Suture removal    Date/Time: 11/5/2024 3:00 PM    Performed by: Soraya Paz MA  Authorized by: Kwasi Philippe MD  Universal Protocol:  procedure performed by consultantConsent: Verbal consent obtained.  Consent given by: patient  Patient understanding: patient states understanding of the procedure being performed  Patient consent: the patient's understanding of the procedure matches consent given  Procedure consent: procedure consent matches procedure scheduled  Relevant documents: relevant documents present and verified  Test results: test results available and properly labeled  Patient identity confirmed: verbally with patient      Patient location:  Clinic  Location:     Laterality:  Right    Location:  Upper extremity    Upper extremity location:  Shoulder    Shoulder location:  R shoulder  Procedure details:     Tools used:  Suture removal kit    Wound appearance:  Indurated, red and warm    Number of sutures removed:  5  Post-procedure details:     Post-removal:  Dressing applied (Vaseline applied)    Patient tolerance of procedure:  Tolerated well, no immediate complications  Comments:      Recommended to start Doxycycline 100 mg. Take one pill twice daily with food and large glass of water. Take second dose of medication at least 2 hours prior to bed. Avoid sun during use of this medication.  [facility] [place] [place]

## 2024-11-07 ENCOUNTER — OFFICE VISIT (OUTPATIENT)
Dept: PODIATRY | Facility: CLINIC | Age: 69
End: 2024-11-07
Payer: MEDICARE

## 2024-11-07 VITALS — HEART RATE: 87 BPM | BODY MASS INDEX: 30.58 KG/M2 | WEIGHT: 259 LBS | HEIGHT: 77 IN | OXYGEN SATURATION: 98 %

## 2024-11-07 DIAGNOSIS — E11.9 TYPE 2 DIABETES MELLITUS WITHOUT COMPLICATION, WITHOUT LONG-TERM CURRENT USE OF INSULIN (HCC): ICD-10-CM

## 2024-11-07 DIAGNOSIS — B99.9 INFECTION: ICD-10-CM

## 2024-11-07 DIAGNOSIS — B35.1 ONYCHOMYCOSIS: Primary | ICD-10-CM

## 2024-11-07 DIAGNOSIS — E11.9 ENCOUNTER FOR DIABETIC FOOT EXAM (HCC): ICD-10-CM

## 2024-11-07 PROCEDURE — 99203 OFFICE O/P NEW LOW 30 MIN: CPT | Performed by: PODIATRIST

## 2024-11-07 NOTE — TELEPHONE ENCOUNTER
"Dr. Hough: I reached out to Mr. Schuster, who contacted me through my extension and left a voice message to inform [facility] (Doxy 100 mg BID twice a day for 10 days). He requested me to submit it to [facility] pharmacy in [place] since the  was unable to do so.The Doxycycline 100 mg BID for 10 days Qty: 20 with 0 refills was phoned in this morning from the . I notified the patient.     Please review and sign the Doxycycline order for me? I went ahead and put it in as a \"phone in\" order since I 've already contacted the pharmacy about the prescription Thank you so much!  "

## 2024-11-11 RX ORDER — DOXYCYCLINE 100 MG/1
100 CAPSULE ORAL 2 TIMES DAILY
Qty: 20 CAPSULE | Refills: 0 | Status: SHIPPED | OUTPATIENT
Start: 2024-11-11 | End: 2024-11-21

## 2024-11-19 ENCOUNTER — OFFICE VISIT (OUTPATIENT)
Dept: DERMATOLOGY | Facility: CLINIC | Age: 69
End: 2024-11-19

## 2024-11-19 VITALS — TEMPERATURE: 97.6 F

## 2024-11-19 DIAGNOSIS — Z51.89 VISIT FOR WOUND CHECK: Primary | ICD-10-CM

## 2024-11-19 NOTE — PROGRESS NOTES
WOUND CHECK  S/P EXCISION PROCEDURE PERFORMED ON 10/22/2024    Physical Exam:  Anatomic Location Affected:  Right Shoulder   Description of wound: healing excision site - erythema and neogenesis of blood vessels in periwound area. Central area with spread scar, mostly re-epithelialized with small scattered areas of continued healing       Additional History of Present Condition:  The patient presents to the office due to concerns about the pinkish appearance of the wound on his right shoulder side. He denies pain or discomfort. He has completed the prescribed course of antibiotics, Doxycyline 100 mg for 10 days and is uncertain about the necessity of additional antibiotics at this point. He mentioned that his wife has observed sign if healing, but he wants confirmation that the wound is indeed progressing as expected.     Assessment and Plan:  Based on a thorough discussion of this condition and the management approach to it (including a comprehensive discussion of the known risks, side effects and potential benefits of treatment), the patient (family) agrees to implement the following specific plan:    Discussed with the patient regarding the unnecessary need for additional antibiotics. Advised to maintain application of Vaseline 2-3 times a day. Noted that the excision site is gradually improving. Emphasized the importance of avoid heavy lifting and continuing to take it easy.

## 2025-01-10 LAB
LEFT EYE DIABETIC RETINOPATHY: NORMAL
RIGHT EYE DIABETIC RETINOPATHY: NORMAL

## 2025-01-14 ENCOUNTER — TELEPHONE (OUTPATIENT)
Dept: CARDIOLOGY CLINIC | Facility: CLINIC | Age: 70
End: 2025-01-14

## 2025-01-14 DIAGNOSIS — E78.2 MIXED DYSLIPIDEMIA: ICD-10-CM

## 2025-01-14 DIAGNOSIS — I48.19 PERSISTENT ATRIAL FIBRILLATION (HCC): ICD-10-CM

## 2025-01-14 DIAGNOSIS — I10 ESSENTIAL HYPERTENSION: ICD-10-CM

## 2025-01-14 DIAGNOSIS — I77.89 ASCENDING AORTA ENLARGEMENT (HCC): Primary | ICD-10-CM

## 2025-01-14 NOTE — TELEPHONE ENCOUNTER
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 Pt needs to schedule Echo and appt with Dr. Castaneda or AP

## 2025-01-15 NOTE — TELEPHONE ENCOUNTER
Called pt to let him know that Charles put in another order for an ECHO and that Sarah from [employer] will be calling him to schedule that. Once that is scheduled, he will be scheduled with Charles for a follow up after the ECHO. Pt understood. Pt mentioned how he may need an EKG as well. Informed pt that at his visit with Charles, and EKG can be performed since it looks like he is due for one anyway.

## 2025-01-16 ENCOUNTER — HOSPITAL ENCOUNTER (OUTPATIENT)
Dept: NON INVASIVE DIAGNOSTICS | Facility: HOSPITAL | Age: 70
Discharge: HOME | End: 2025-01-16
Payer: MEDICARE

## 2025-01-16 VITALS
BODY MASS INDEX: 30.58 KG/M2 | SYSTOLIC BLOOD PRESSURE: 112 MMHG | HEIGHT: 77 IN | DIASTOLIC BLOOD PRESSURE: 70 MMHG | WEIGHT: 259 LBS | HEART RATE: 87 BPM

## 2025-01-16 DIAGNOSIS — I77.89 ASCENDING AORTA ENLARGEMENT (HCC): ICD-10-CM

## 2025-01-16 DIAGNOSIS — I48.19 PERSISTENT ATRIAL FIBRILLATION (HCC): ICD-10-CM

## 2025-01-16 DIAGNOSIS — I10 ESSENTIAL HYPERTENSION: ICD-10-CM

## 2025-01-16 DIAGNOSIS — E78.2 MIXED DYSLIPIDEMIA: ICD-10-CM

## 2025-01-16 LAB
AORTIC ROOT: 3.2 CM
ASCENDING AORTA: 4.2 CM
BSA FOR ECHO PROCEDURE: 2.5 M2
DOP CALC LVOT AREA: 4.15 CM2
DOP CALC LVOT DIAMETER: 2.3 CM
FRACTIONAL SHORTENING: 34 (ref 28–44)
INTERVENTRICULAR SEPTUM IN DIASTOLE (PARASTERNAL SHORT AXIS VIEW): 1.4 CM
INTERVENTRICULAR SEPTUM: 1.4 CM (ref 0.6–1.1)
LAAS-AP2: 25.8 CM2
LAAS-AP4: 24.1 CM2
LEFT ATRIUM SIZE: 4.4 CM
LEFT ATRIUM VOLUME (MOD BIPLANE): 84 ML
LEFT ATRIUM VOLUME INDEX (MOD BIPLANE): 33.6 ML/M2
LEFT INTERNAL DIMENSION IN SYSTOLE: 3.1 CM (ref 2.1–4)
LEFT VENTRICULAR INTERNAL DIMENSION IN DIASTOLE: 4.7 CM (ref 3.5–6)
LEFT VENTRICULAR POSTERIOR WALL IN END DIASTOLE: 1.3 CM
LEFT VENTRICULAR STROKE VOLUME: 65 ML
LV EF US.2D.A4C+ESTIMATED: 61 %
LVSV (TEICH): 65 ML
RIGHT ATRIAL 2D VOLUME: 72 ML
RIGHT ATRIUM AREA SYSTOLE A4C: 23.3 CM2
RIGHT VENTRICLE ID DIMENSION: 4.6 CM
SINOTUBULAR JUNCTION: 3.1 CM
SL CV LEFT ATRIUM LENGTH A2C: 6.7 CM
SL CV LV EF: 60
SL CV PED ECHO LEFT VENTRICLE DIASTOLIC VOLUME (MOD BIPLANE) 2D: 102 ML
SL CV PED ECHO LEFT VENTRICLE SYSTOLIC VOLUME (MOD BIPLANE) 2D: 37 ML
SL CV SINUS OF VALSALVA 2D: 3.2 CM
STJ: 3.1 CM
TRICUSPID ANNULAR PLANE SYSTOLIC EXCURSION: 2 CM

## 2025-01-16 PROCEDURE — 93306 TTE W/DOPPLER COMPLETE: CPT

## 2025-01-16 PROCEDURE — 93306 TTE W/DOPPLER COMPLETE: CPT | Performed by: INTERNAL MEDICINE

## 2025-01-25 ENCOUNTER — RESULTS FOLLOW-UP (OUTPATIENT)
Dept: CARDIOLOGY CLINIC | Facility: CLINIC | Age: 70
End: 2025-01-25

## 2025-02-04 ENCOUNTER — OFFICE VISIT (OUTPATIENT)
Dept: CARDIOLOGY CLINIC | Facility: CLINIC | Age: 70
End: 2025-02-04
Payer: MEDICARE

## 2025-02-04 VITALS
OXYGEN SATURATION: 96 % | DIASTOLIC BLOOD PRESSURE: 60 MMHG | HEART RATE: 66 BPM | TEMPERATURE: 98.2 F | BODY MASS INDEX: 30.7 KG/M2 | WEIGHT: 260 LBS | HEIGHT: 77 IN | SYSTOLIC BLOOD PRESSURE: 110 MMHG

## 2025-02-04 DIAGNOSIS — G47.33 OSA (OBSTRUCTIVE SLEEP APNEA): ICD-10-CM

## 2025-02-04 DIAGNOSIS — I48.19 PERSISTENT ATRIAL FIBRILLATION (HCC): ICD-10-CM

## 2025-02-04 DIAGNOSIS — E11.628 TYPE 2 DIABETES MELLITUS WITH OTHER SKIN COMPLICATIONS (HCC): Primary | ICD-10-CM

## 2025-02-04 DIAGNOSIS — I10 ESSENTIAL HYPERTENSION: ICD-10-CM

## 2025-02-04 DIAGNOSIS — E78.5 DYSLIPIDEMIA: ICD-10-CM

## 2025-02-04 DIAGNOSIS — I77.89 ASCENDING AORTA ENLARGEMENT (HCC): ICD-10-CM

## 2025-02-04 PROCEDURE — 93000 ELECTROCARDIOGRAM COMPLETE: CPT

## 2025-02-04 PROCEDURE — 99214 OFFICE O/P EST MOD 30 MIN: CPT

## 2025-02-04 RX ORDER — TADALAFIL 20 MG/1
20 TABLET ORAL
COMMUNITY
Start: 2024-09-13

## 2025-02-04 NOTE — PROGRESS NOTES
Heath Schuster  1955  [id number]  [facility]  [address] [facility] [address] [phone]    1. Type 2 diabetes mellitus with other skin complications (HCC)        2. Persistent atrial fibrillation (HCC)  POCT ECG      3. Essential hypertension        4. Ascending aorta enlargement (HCC)        5. YEHUDA (obstructive sleep apnea)        6. Dyslipidemia            Summary/Discussion:  Persistent atrial fibrillation  - EKG today demonstrating rate controlled atrial fibrillation   - per prior documentation with his cardiologist discussion had on the options of ablation, cardioversion, and ongoing medical therapy with beta-blockers and anticoagulation in which he was not keen on EP evaluation at that time. Given that his LV function is preserved on echo and he remains asymptomatic will continue rate control strategy with metoprolol tartrate 12.5 mg twice daily. Further discussion can be had with his primary cardiologist on rhythm control at follow up appointment if indicated  - echo (1/2025): normal LV, LVEF 60%, no WMA, RV mildly dilated, mild MR, mildly dilated ascending aorta of 4.2 cm  - anticoagulation on eliquis 5 mg twice daily    Hypertension:  - 110/60  - continue present medication regimen  - lifestyle modification   - close blood pressure monitoring     Dyslipidemia:  - Lipid Profile:    Latest Reference Range & Units 05/08/24 12:41   Cholesterol See Comment mg/dL 106   Triglycerides See Comment mg/dL 168 (H)   HDL >=40 mg/dL 43   LDL Calculated 0 - 100 mg/dL 29   - continue atorvastatin 20 mg daily    - encouraged low cholesterol diet and annual lipid follow up    Obstructive sleep apnea:  - on CPAP     Ascending aorta enlargement:  -  CT chest (5/2024): ascending aorta stable at 42 mm  - continue surveillance management and blood pressure control     Interval History: Heath Schuster is a 69 y.o. 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     Since his last office visit he has no significant cardiac complaints. He denies any chest pain/pressure/discomfort or shortness of breath. He denies lower extremity edema, orthopnea, and PND. He denies lightheadedness, dizziness, and syncope. He denies palpitations. He has good function capacity without any significant cardiac limitations.       No further cardiac testing indicated at this time.     He will RTO in 9 months with Dr. Castaneda or sooner if necessary. He will call with any concerns.         Medical Problems       Problem List       Encounter for colonoscopy due to history of colonic polyp    Type 2 diabetes mellitus without complication, without long-term current use of insulin (HCC)      Lab Results   Component Value Date    HGBA1C 6.8 09/06/2024         Elbow pain, chronic, right    Persistent atrial fibrillation (HCC)    Essential hypertension    Mixed dyslipidemia    Ascending aorta enlargement (HCC)    COVID-19    HSV-1 (herpes simplex virus 1) infection    Gout    Erectile dysfunction    History of appendectomy    Overview Signed 1/19/2023  9:44 AM by Ricardo Park MD   Sep 15, 2015 Entered By: BERNIE PATTERSON Comment: 1963         Skin tag    Paroxysmal atrial fibrillation (HCC)    Overview Signed 2/9/2023  8:37 AM by Maryjane Willis MD   Feb 08, 2022 Entered By: MARILIN OCHOA Comment: per 2/7/22 anticoag consult         YEHUDA (obstructive sleep apnea)    Asymptomatic varicose veins of right lower extremity    Callus between toes    Class 1 obesity due to excess calories with serious comorbidity and body mass index (BMI) of 30.0 to 30.9 in adult    Hyperlipidemia    Persistent cough    Thumb pain    Arm skin lesion, right    Rib pain on left side        Past Medical History:   Diagnosis Date    Appendicitis     Atrial fibrillation (HCC)     Colon polyp     Diabetes mellitus (HCC)     Hyperlipidemia      Hypertension     Shoulder pain, right 03/31/2023     Social History     Socioeconomic History    Marital status:      Spouse name: Not on file    Number of children: Not on file    Years of education: Not on file    Highest education level: Not on file   Occupational History    Not on file   Tobacco Use    Smoking status: Never     Passive exposure: Never    Smokeless tobacco: Never   Vaping Use    Vaping status: Never Used   Substance and Sexual Activity    Alcohol use: Not Currently    Drug use: Never    Sexual activity: Not Currently     Partners: Female   Other Topics Concern    Not on file   Social History Narrative    Not on file     Social Drivers of Health     Financial Resource Strain: Low Risk  (1/13/2023)    Overall Financial Resource Strain (CARDIA)     Difficulty of Paying Living Expenses: Not hard at all   Food Insecurity: No Food Insecurity (5/7/2024)    Nursing - Inadequate Food Risk Classification     Worried About Running Out of Food in the Last Year: Never true     Ran Out of Food in the Last Year: Never true     Ran Out of Food in the Last Year: Not on file   Transportation Needs: No Transportation Needs (5/7/2024)    PRAPARE - Transportation     Lack of Transportation (Medical): No     Lack of Transportation (Non-Medical): No   Physical Activity: Not on file   Stress: Not on file   Social Connections: Not on file   Intimate Partner Violence: Not on file   Housing Stability: Low Risk  (5/7/2024)    Housing Stability Vital Sign     Unable to Pay for Housing in the Last Year: No     Number of Times Moved in the Last Year: 1     Homeless in the Last Year: No      Family History   Problem Relation Age of Onset    Throat cancer Father     Prostate cancer Brother     Alcohol abuse Brother      Past Surgical History:   Procedure Laterality Date    APPENDECTOMY      CHOLECYSTECTOMY      COLONOSCOPY         Current Outpatient Medications:     apixaban (Eliquis) 5 mg, Take 1 tablet (5 mg total) by  "mouth 2 (two) times a day, Disp: 60 tablet, Rfl: 2    atorvastatin (LIPITOR) 20 mg tablet, Take 20 mg by mouth daily, Disp: , Rfl:     lisinopril (ZESTRIL) 5 mg tablet, TAKE ONE TABLET BY MOUTH EVERY DAY FOR BLOOD PRESSURE/HEART, Disp: , Rfl:     metFORMIN (GLUCOPHAGE) 500 mg tablet, Take 1,000 mg by mouth, Disp: , Rfl:     metoprolol tartrate (LOPRESSOR) 25 mg tablet, Take 0.5 tablets by mouth 2 (two) times a day, Disp: , Rfl:     Multiple Vitamins-Minerals (CENTRUM ADULTS PO), Take 50 mg by mouth, Disp: , Rfl:     omeprazole (PriLOSEC) 20 mg delayed release capsule, , Disp: , Rfl:     Tadalafil, PAH, 20 MG TABS, Take 20 mg by mouth, Disp: , Rfl:   Allergies   Allergen Reactions    Other Shortness Of Breath     Cats and dogs       Labs:     Chemistry        Component Value Date/Time     03/21/2014 1252    K 4.3 05/08/2024 1241    K 4.4 04/08/2019 0822     05/08/2024 1241     04/08/2019 0822    CO2 27 05/08/2024 1241    CO2 27 04/08/2019 0822    BUN 25 05/08/2024 1241    BUN 22 04/08/2019 0822    CREATININE 1.05 05/08/2024 1241    CREATININE 1.01 04/08/2019 0822        Component Value Date/Time    CALCIUM 9.6 05/08/2024 1241    CALCIUM 8.7 04/08/2019 0822    ALKPHOS 48 05/08/2024 1241    ALKPHOS 64 04/08/2019 0822    AST 18 05/08/2024 1241    AST 17 04/08/2019 0822    ALT 19 05/08/2024 1241    ALT 33 04/08/2019 0822    BILITOT 1.70 (H) 09/17/2014 1021            Lab Results   Component Value Date    CHOL 123 03/21/2014     Lab Results   Component Value Date    HDL 43 05/08/2024    HDL 46 11/03/2023    HDL 41 09/20/2017     Lab Results   Component Value Date    LDLCALC 29 05/08/2024    LDLCALC 39 11/03/2023    LDLCALC 82 09/20/2017     Lab Results   Component Value Date    TRIG 168 (H) 05/08/2024    TRIG 124 11/03/2023    TRIG 333 (H) 09/20/2017     No results found for: \"CHOLHDL\"    Imaging: Echo complete w/ contrast if indicated  Result Date: 1/16/2025  Narrative:   Left Ventricle: Left " "ventricular cavity size is normal. Wall thickness is normal. The left ventricular ejection fraction is 60%. Systolic function is normal. Wall motion is normal.   Right Ventricle: Right ventricular cavity size is mildly dilated. Systolic function is normal.   Left Atrium: The atrium is mildly dilated.   Right Atrium: The atrium is mildly dilated.   Mitral Valve: There is mild regurgitation.   Aorta: The ascending aorta is mildly dilated. The ascending aorta is 4.2 cm.       ECG:  atrial fibrillation     Review of Systems   All other systems reviewed and are negative.      Vitals:    02/04/25 0951   BP: 110/60   Pulse: 66   Temp: 98.2 °F (36.8 °C)   SpO2: 96%     Vitals:    02/04/25 0951   Weight: 118 kg (260 lb)     Height: 6' 5\" (195.6 cm)   Body mass index is 30.83 kg/m².    Physical Exam  Vitals and nursing note reviewed.   Constitutional:       General: He is not in acute distress.     Appearance: Normal appearance. He is not ill-appearing.   HENT:      Head: Normocephalic.      Nose: Nose normal.      Mouth/Throat:      Mouth: Mucous membranes are moist.      Pharynx: Oropharynx is clear.   Cardiovascular:      Rate and Rhythm: Normal rate. Rhythm irregular.      Pulses: Normal pulses.      Heart sounds: Normal heart sounds. No murmur heard.  Pulmonary:      Effort: Pulmonary effort is normal.      Breath sounds: Normal breath sounds.   Musculoskeletal:         General: Normal range of motion.      Cervical back: Normal range of motion.      Right lower leg: No edema.      Left lower leg: No edema.   Skin:     General: Skin is warm and dry.   Neurological:      Mental Status: He is alert and oriented to person, place, and time.   Psychiatric:         Mood and Affect: Mood normal.         Behavior: Behavior normal.        "

## 2025-02-06 ENCOUNTER — TELEPHONE (OUTPATIENT)
Age: 70
End: 2025-02-06

## 2025-02-06 ENCOUNTER — OFFICE VISIT (OUTPATIENT)
Dept: PODIATRY | Facility: CLINIC | Age: 70
End: 2025-02-06
Payer: MEDICARE

## 2025-02-06 VITALS — HEIGHT: 77 IN | BODY MASS INDEX: 30.46 KG/M2 | HEART RATE: 80 BPM | OXYGEN SATURATION: 100 % | WEIGHT: 258 LBS

## 2025-02-06 DIAGNOSIS — B35.1 ONYCHOMYCOSIS: Primary | ICD-10-CM

## 2025-02-06 DIAGNOSIS — E11.9 TYPE 2 DIABETES MELLITUS WITHOUT COMPLICATION, WITHOUT LONG-TERM CURRENT USE OF INSULIN (HCC): ICD-10-CM

## 2025-02-06 DIAGNOSIS — S80.822A: ICD-10-CM

## 2025-02-06 PROCEDURE — 99213 OFFICE O/P EST LOW 20 MIN: CPT | Performed by: PODIATRIST

## 2025-02-06 NOTE — TELEPHONE ENCOUNTER
Caller: Ramakrishna Schuster    Doctor: Dr. 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 Patient wanted to check the status.     Call back#: [phone]

## 2025-02-06 NOTE — PROGRESS NOTES
Assessment/Plan:     The patient's clinical examination today significant for brittle, thickened and discolored pedal nail plates consistent with onychomycosis x 10.  There is a healed blister site noted to the distal aspect of the left second toe that is not infected.  It has completely reepithelialized.  There are no open lesions no signs of infection noted to either lower extremity.  Pedal pulses are palpable bilaterally.  Interdigital spaces are clear without maceration.  Vibratory and epicritic sensations are intact bilaterally.  Skin is dry without pruritus.     The pedal nail plates are sharply debrided with a sterile nail clipper x 10 without complication.  The nails then mechanically reduced in thickness and girth denies a rotary bur without incident.  The blister site is stable and does not require any dressings.  His most recent hemoglobin A1c from September 2024 was 6.8, prior to that it was 6.2 in March 2023.    Follow-up in 3 months or as needed.        Diagnoses and all orders for this visit:    Onychomycosis    Type 2 diabetes mellitus without complication, without long-term current use of insulin (HCC)    Friction blister of left lower extremity, initial encounter          Subjective:     Patient ID: Heath Schuster is a 69 y.o. male.    The patient presents today for follow-up of thickened and elongated pedal nail plates.  He has a history of diabetes and is on oral hypoglycemics.  He states that his blood sugars under good control.  He denies any numbness or tingling sensations to either foot.      PAST MEDICAL HISTORY:  Past Medical History:   Diagnosis Date    Appendicitis     Atrial fibrillation (HCC)     Colon polyp     Diabetes mellitus (HCC)     Hyperlipidemia     Hypertension     Shoulder pain, right 03/31/2023       PAST SURGICAL HISTORY:  Past Surgical History:   Procedure Laterality Date    APPENDECTOMY      CHOLECYSTECTOMY      COLONOSCOPY           ALLERGIES:  Other    MEDICATIONS:  Current Outpatient Medications   Medication Sig Dispense Refill    atorvastatin (LIPITOR) 20 mg tablet Take 20 mg by mouth daily      lisinopril (ZESTRIL) 5 mg tablet TAKE ONE TABLET BY MOUTH EVERY DAY FOR BLOOD PRESSURE/HEART      metFORMIN (GLUCOPHAGE) 500 mg tablet Take 1,000 mg by mouth      metoprolol tartrate (LOPRESSOR) 25 mg tablet Take 0.5 tablets by mouth 2 (two) times a day      Multiple Vitamins-Minerals (CENTRUM ADULTS PO) Take 50 mg by mouth      omeprazole (PriLOSEC) 20 mg delayed release capsule       Tadalafil, PAH, 20 MG TABS Take 20 mg by mouth      apixaban (Eliquis) 5 mg Take 1 tablet (5 mg total) by mouth 2 (two) times a day 60 tablet 2     No current facility-administered medications for this visit.       SOCIAL HISTORY:  Social History     Socioeconomic History    Marital status:      Spouse name: None    Number of children: None    Years of education: None    Highest education level: None   Occupational History    None   Tobacco Use    Smoking status: Never     Passive exposure: Never    Smokeless tobacco: Never   Vaping Use    Vaping status: Never Used   Substance and Sexual Activity    Alcohol use: Not Currently    Drug use: Never    Sexual activity: Not Currently     Partners: Female   Other Topics Concern    None   Social History Narrative    None     Social Drivers of Health     Financial Resource Strain: Low Risk  (1/13/2023)    Overall Financial Resource Strain (CARDIA)     Difficulty of Paying Living Expenses: Not hard at all   Food Insecurity: No Food Insecurity (5/7/2024)    Nursing - Inadequate Food Risk Classification     Worried About Running Out of Food in the Last Year: Never true     Ran Out of Food in the Last Year: Never true     Ran Out of Food in the Last Year: Not on file   Transportation Needs: No Transportation Needs (5/7/2024)    PRAPARE - Transportation     Lack of Transportation (Medical): No     Lack of Transportation  (Non-Medical): No   Physical Activity: Not on file   Stress: Not on file   Social Connections: Not on file   Intimate Partner Violence: Not on file   Housing Stability: Low Risk  (5/7/2024)    Housing Stability Vital Sign     Unable to Pay for Housing in the Last Year: No     Number of Times Moved in the Last Year: 1     Homeless in the Last Year: No        Review of Systems   Constitutional: Negative.    HENT: Negative.     Eyes: Negative.    Respiratory: Negative.     Cardiovascular: Negative.    Endocrine: Negative.    Musculoskeletal: Negative.    Neurological: Negative.    Hematological: Negative.    Psychiatric/Behavioral: Negative.           Objective:     Physical Exam  Vitals reviewed.   Constitutional:       Appearance: Normal appearance.   HENT:      Head: Normocephalic and atraumatic.      Nose: Nose normal.   Eyes:      Conjunctiva/sclera: Conjunctivae normal.      Pupils: Pupils are equal, round, and reactive to light.   Cardiovascular:      Pulses:           Dorsalis pedis pulses are 2+ on the right side and 2+ on the left side.        Posterior tibial pulses are 1+ on the right side and 1+ on the left side.   Pulmonary:      Effort: Pulmonary effort is normal.   Feet:      Right foot:      Skin integrity: Skin integrity normal.      Toenail Condition: Right toenails are abnormally thick and long. Fungal disease present.     Left foot:      Skin integrity: Skin integrity normal.      Toenail Condition: Left toenails are abnormally thick and long. Fungal disease present.     Comments: The patient's clinical examination today significant for brittle, thickened and discolored pedal nail plates consistent with onychomycosis x 10.  There is a healed blister site noted to the distal aspect of the left second toe that is not infected.  It has completely reepithelialized.  There are no open lesions no signs of infection noted to either lower extremity.  Pedal pulses are palpable bilaterally.  Interdigital  spaces are clear without maceration.  Vibratory and epicritic sensations are intact bilaterally.  Skin is dry without pruritus.     Skin:     General: Skin is warm.      Capillary Refill: Capillary refill takes less than 2 seconds.   Neurological:      General: No focal deficit present.      Mental Status: He is alert and oriented to person, place, and time.   Psychiatric:         Mood and Affect: Mood normal.         Behavior: Behavior normal.         Thought Content: Thought content normal.

## 2025-02-17 ENCOUNTER — TELEPHONE (OUTPATIENT)
Dept: DERMATOLOGY | Facility: CLINIC | Age: 70
End: 2025-02-17

## 2025-02-17 ENCOUNTER — TELEPHONE (OUTPATIENT)
Age: 70
End: 2025-02-17

## 2025-02-17 NOTE — TELEPHONE ENCOUNTER
PT walked in re:MOHS procedure scheduled for Fri 2/21    PT concerned b/c has event planned for Sat 2/22 & not sure if site will be healed enough for him to attend    PT looking to reschedule if possible    Pls contact PT to assist

## 2025-02-17 NOTE — TELEPHONE ENCOUNTER
Pt calling to inquire if he should d/c eliquis for upcoming mohs. Relayed not to d/c, reviewed pre op letter.

## 2025-02-17 NOTE — TELEPHONE ENCOUNTER
Spoke to Ramakrishna and he has a concert Saturday and is concerned about having surgery day before but does not want to wait until summer if he r/s- I told him I'd have a DCA call him tomorrow to discuss any restrictions.

## 2025-02-21 ENCOUNTER — PROCEDURE VISIT (OUTPATIENT)
Dept: DERMATOLOGY | Facility: CLINIC | Age: 70
End: 2025-02-21
Payer: MEDICARE

## 2025-02-21 VITALS
OXYGEN SATURATION: 99 % | SYSTOLIC BLOOD PRESSURE: 130 MMHG | DIASTOLIC BLOOD PRESSURE: 60 MMHG | TEMPERATURE: 98.8 F | HEART RATE: 88 BPM | HEIGHT: 77 IN | BODY MASS INDEX: 30.7 KG/M2 | WEIGHT: 260 LBS

## 2025-02-21 DIAGNOSIS — C44.91 NODULAR BASAL CELL CARCINOMA (BCC): ICD-10-CM

## 2025-02-21 PROCEDURE — 17311 MOHS 1 STAGE H/N/HF/G: CPT | Performed by: DERMATOLOGY

## 2025-02-21 PROCEDURE — 12031 INTMD RPR S/A/T/EXT 2.5 CM/<: CPT | Performed by: DERMATOLOGY

## 2025-02-21 NOTE — PROGRESS NOTES
MOHS Procedure Note    Patient: Heath Schuster  : 1955  MRN: [id number]  Date: 2025    History of Present Illness: The patient is a 69 y.o. male who presents with complaints of basal cell carcinoma on the left occipital hair line.     Past Medical History:   Diagnosis Date    Appendicitis     Atrial fibrillation (HCC)     Basal cell carcinoma 10/16/2024    left occipital hair line    Colon polyp     Diabetes mellitus (HCC)     Hyperlipidemia     Hypertension     Shoulder pain, right 2023       Past Surgical History:   Procedure Laterality Date    APPENDECTOMY      CHOLECYSTECTOMY      COLONOSCOPY      MOHS SURGERY Left 2025    left occipital hair line         Current Outpatient Medications:     atorvastatin (LIPITOR) 20 mg tablet, Take 20 mg by mouth daily, Disp: , Rfl:     lisinopril (ZESTRIL) 5 mg tablet, TAKE ONE TABLET BY MOUTH EVERY DAY FOR BLOOD PRESSURE/HEART, Disp: , Rfl:     metFORMIN (GLUCOPHAGE) 500 mg tablet, Take 1,000 mg by mouth, Disp: , Rfl:     metoprolol tartrate (LOPRESSOR) 25 mg tablet, Take 0.5 tablets by mouth 2 (two) times a day, Disp: , Rfl:     Multiple Vitamins-Minerals (CENTRUM ADULTS PO), Take 50 mg by mouth, Disp: , Rfl:     omeprazole (PriLOSEC) 20 mg delayed release capsule, , Disp: , Rfl:     Tadalafil, PAH, 20 MG TABS, Take 20 mg by mouth, Disp: , Rfl:     apixaban (Eliquis) 5 mg, Take 1 tablet (5 mg total) by mouth 2 (two) times a day, Disp: 60 tablet, Rfl: 2    Allergies   Allergen Reactions    Other Shortness Of Breath     Cats and dogs       Physical Exam:   Vitals:    25 0912   BP: 130/60   Pulse: 88   Temp: 98.8 °F (37.1 °C)   SpO2: 99%     General: Awake, Alert, Oriented x 3, Mood and affect appropriate  Respiratory: Respirations even and unlabored  Cardiovascular: Peripheral pulses intact; no edema  Musculoskeletal Exam: n/a    Skin: 0.9 cm x 0.7 cm pink papule on the left occipital hairline.    Assessment: Biopsy proven to be superficial  and nodular basal cell carcinoma.     Plan: Mohs    Time of H&P Completion: 0950      MOHS Procedure Timeout      Flowsheet Row Most Recent Value   Timeout: 0955   Patient Identity Verified: Yes   Correct Site Verified: Yes   Correct Procedure Verified: Yes            MOHS Diagnosis/Indication/Location/ID      Flowsheet Row Most Recent Value   Pathology Type Basal cell carcinoma   Anatomic Site --  [left occipital hair line]   Indications for MOHS tumor location   MOHS ID [id number]            MOHS Site/Accession/Pre-Post      Flowsheet Row Most Recent Value   Original Site Identified (as submitted by referring clinician) Photo   Biopsy Accession/Specimen # (as submitted by referring clincian) [id number]   Pre-MOHS Size Length (cm) 0.9   Pre-MOHS Size Width (cm) 0.7   Post-MOHS Size-Length (cm) 0.9   Post MOHS Size-Width (cm) 1.2   Repair Type Intermediate layered closure   Suture Type Vicryl, Prolene   Prolene Suture Size 4   Vicryl Suture Size 4   Final repair length (cm): 2.5   Anesthetic Used 1% Lidocaine with epinephrine  [buffered]            MOHS Tumor Stage 1 Information      Flowsheet Row Most Recent Value   Tissue Sections (blocks) 2   Microscopic Exam Section 1: No tumor identified in section.   Microscopic Exam Section 2: No tumor identified in section.   Tumor Clear After Stage I? Yes                        Patient identified, procedure verified, site identified and verified. Time out completed. Surgical removal of the lesion discussed with the patient (risks and benefits, including possibility of scarring, infection, recurrence or potential for further treatment)  I have specifically identified the site with the patient. I have discussed the fact that the patient will have a scar after the procedure regardless of granulation or repair with sutures. I have discussed that the repair options can range from granulation in some cases to linear or curvilinear closures to larger flaps or grafts.  There are  sometimes flaps or grafts used that require multiples stages of surgery and will not be completed today, rather be completed over a series of appointments. I have discussed that occasionally due to location, size or depth of the lesion I may recommend consultation with and transfer of care for further removal or the reconstruction to another provider such as ophthalmology surgery, plastic surgery, ENT surgery, or surgical oncology. There are cases in which other testing such as imaging with MRI or CT scan or testing of lymph nodes is recommended because of the nature/depth/location of tumor seen during the removal. There is a risk of injury to nerves causing temporary or permanent numbness or the inability to move muscles full such as the inability to lift eyebrows. Questions answered and verbal and written consent was obtained.    The tumor qualifies for Mohs based on AUC criteria. Dr. Paredes served as the surgeon and pathologist during the procedure.    With the patient in the supine position and under adequate local anesthesia with 1% lidocaine with epinephrine 1:100,000, the defect was scrubbed with Iodine. Sterile drapes were placed from the sterile tray.  Because of the location of the surgical defect, an intermediate closure was judged to give the best possible cosmetic and functional result.  The edges of the defect were carefully debrided removing any dead or coagulated tissue.      Hemostasis was obtained by pinpoint electrocoagulation.  Careful planning of removal of redundant tissue at either end of the defect was drawn out so that the suture lines would fall in the optimal orientation with regard to the relaxed skin tension lines.  These were then removed with a #15 blade scalpel.  The wound was then approximated by a deep layer of buried vertical mattress sutures and the cutaneous margins were approximated and closed by superficial sutures as noted above.  Estimated blood loss was less than 5 mL.       The patient tolerated the procedure well.  The wound was dressed with petrolatum, a non-stick pad, and a compression dressing.     Aniket Paredes MD served as the surgeon and pathologist during the procedure.    Postoperative care: Wound care discussed at length.  I urged the patient to call us if any problems or question should arise.     Complications: none  Post-op medications: none  Patient condition after procedure: stable  Discharge plans: Plan for return to us for suture removal, as scheduled in 14 days.     BCC cleared with 1 stage of mohs and repaired with 2.5cm closure.  Well tolerated.  S/R in 2 weeks.    Scribe Attestation      I,:  Shannan Epstein RN am acting as a scribe while in the presence of the attending physician.:       I,:  Aniket Paredes MD personally performed the services described in this documentation    as scribed in my presence.:

## 2025-02-21 NOTE — PATIENT INSTRUCTIONS
"Mohs Microscopic Surgery After Care    WOUND CARE AFTER SURGERY:    Do NOT to remove the pressure bandage for 48 hours. Keep the area clean and dry while this bandage is on.    After removing the bandage for the first time, gently clean the area with soap and water. If the bandage is difficult to remove, getting the bandage wet in the shower will sometimes help soften the adhesive and allow it to be removed more easily.     You will now need to cleanse this area daily in the shower with gentle soap. There is no need to scrub the area. Apply plain Vaseline ointment (this is over the counter and not a prescription) to the site followed by a clean appropriately sized bandage to area.  Non stick dressing and paper tape (or Hypafix) are recommended for sensitive skin but a bandaid is fine if it covers the area well.    You will need to continue the above daily wound care until you return for suture removal in 14 days (generally 7 days for the face, 10-14 days off the face)      RESTRICTIONS:     For two DAYS:   - You will need to take it very easy as this time is highest risk for bleeding. Being a \"couch potato\" during these two days is generally recommended.   - For surgeries on the face/neck/scalp: Avoid leaning down to pick things up off the floor as this brings blood up to your head. Instead, squat down to pick things up.     For two WEEKS:   - No heavy lifting (anything greater than 10 pounds)   - You can start to do slow, gentle activities such as slow walking but nothing to increase your heart rate and blood pressure too much (such as cardiovascular exercise). It is important to take it easy as there is still a risk for bleeding and a high risk popping of stitches open during this time.     If we did surgery near the eyes (including the nose, forehead, front part of your scalp, cheeks): It is VERY common to get a large amount of swelling around your eyes (puffy eyes). Although less frequent, this can be enough to " swell your eyes shut and can also come along with bruising. This should not hurt and is very expected and normal. It is typically worst at ~ 3 days out from your surgery and dramatically better 1 week post-operatively.     MANAGING YOUR PAIN AFTER SURGERY     You can expect to have some pain after surgery. This is normal. The pain is typically worse the first two days after surgery, and quickly begins to get better.     The best strategy for controlling your pain after surgery is around the clock pain control. You can take over the counter Acetaminophen (Tylenol) for discomfort, if no contraindications.     If you are taking this at the maximum dose, you can alternate this with Motrin (ibuprofen or Advil) as well. Alternating these medications with each other allows you to maximize your pain control. In addition to Tylenol and Motrin, you can use heating pads or ice packs on your incisions to help reduce your pain.     How will I alternate your regular strength over-the-counter pain medication?  You will take a dose of pain medication every three hours.   Start by taking 650 mg of Tylenol (2 pills of 325 mg)   3 hours later take 600 mg of Motrin (3 pills of 200 mg)   3 hours after taking the Motrin take 650 mg of Tylenol   3 hours after that take 600 mg of Motrin.    See example - if your first dose of Tylenol is at 12:00 PM     12:00 PM  Tylenol 650 mg (2 pills of 325 mg)    3:00 PM  Motrin 600 mg (3 pills of 200 mg)    6:00 PM  Tylenol 650 mg (2 pills of 325 mg)    9:00 PM  Motrin 600 mg (3 pills of 200 mg)    Continue alternating every 3 hours      Important:   Do not take more than 4000mg of Tylenol or 3200mg of Motrin in a 24-hour period.     What if I still have pain?   If you have pain that is not controlled with the over-the-counter pain medications (Tylenol and Motrin or Advil), don't hesitate to call our staff using the number provided. We will help make sure you are managing your pain in the best way  possible, and if necessary, we can provide a prescription for additional pain medication.     CALL OUR OFFICE IMMEDIATELY FOR ANY SIGNS OF INFECTION:    This includes fever, chills, increased redness, warmth, tenderness, severe discomfort/pain, or pus or foul smell coming from the wound. If you are experiencing any of the above, please call [facility] directly at [phone].    IF BLEEDING IS NOTICED:    Place a clean cloth over the area and apply firm pressure for thirty minutes.  Check the wound ONLY after 30 minutes of direct pressure; do not cheat and sneak a peak, as that does not count.  If bleeding persists after 30 minutes of legitimate direct pressure, then try one more round of direct pressure to the area.  Should the bleeding become heavier or not stop after the second attempt, call [facility] directly at [phone]. Your call will get routed to the dermatology surgeon on call even after hours.

## 2025-02-24 ENCOUNTER — OFFICE VISIT (OUTPATIENT)
Dept: INTERNAL MEDICINE CLINIC | Facility: CLINIC | Age: 70
End: 2025-02-24
Payer: MEDICARE

## 2025-02-24 VITALS
DIASTOLIC BLOOD PRESSURE: 78 MMHG | TEMPERATURE: 98.9 F | BODY MASS INDEX: 30.23 KG/M2 | SYSTOLIC BLOOD PRESSURE: 120 MMHG | HEART RATE: 88 BPM | HEIGHT: 77 IN | RESPIRATION RATE: 14 BRPM | WEIGHT: 256 LBS | OXYGEN SATURATION: 97 %

## 2025-02-24 DIAGNOSIS — T14.8XXA MUSCLE STRAIN: Primary | ICD-10-CM

## 2025-02-24 PROCEDURE — G2211 COMPLEX E/M VISIT ADD ON: HCPCS

## 2025-02-24 PROCEDURE — 99213 OFFICE O/P EST LOW 20 MIN: CPT

## 2025-02-24 RX ORDER — PREDNISONE 10 MG/1
TABLET ORAL
Qty: 12 TABLET | Refills: 0 | Status: SHIPPED | OUTPATIENT
Start: 2025-02-24

## 2025-02-24 NOTE — PROGRESS NOTES
"Name: Heath Schuster      : 1955      MRN: [id number]  Encounter Provider: Vernon Vazquez MD  Encounter Date: 2025   Encounter department: [facility]  :  Assessment & Plan  Muscle strain  69-year-old male with PMHx A-fib on Eliquis and diabetes presents with left lateral thigh pain x 2 weeks.  Occurs with weightbearing.  Recently started playing pickle ball, but denies specific injury  No relieving factors tried at home    Plan   Rest for at least 1 week  May use over-the-counter Tylenol as needed for pain  Refrain from using NSAIDs given Eliquis use  May apply ice or heat to affected area  Patient prescribed a 6-day course of prednisone that is to be tapered as below  Pt advised to monitor blood glucose levels closely while taking steroids and call the office if readings are significantly elevated.    Orders:    predniSONE 10 mg tablet; Take 3 tabs by mouth daily for 2 days, then 2 tabs by mouth daily for 2 days, then 1 tab daily for 2 days.           History of Present Illness     HPI  Heath Schuster is a 69-year-old male with a PMHx A-fib on Eliquis, HTN, HLD, DM, YEHUDA presents due to left lateral thigh pain ongoing 2 weeks.  He states the pain only occurs with weightbearing, but tends to \"level out\" with ambulation. He describes the pain as sharp, shooting and rates it as an 8/10 at its worst.  Patient is very active and walks approximately 3 miles daily, but last month began to incorporate pickleball 3 times a week.  He states it is a fast paced sport with sharp movements and feels he may have injured himself playing the game.  However, he does not recall any specific injuries, traumas, falls. Denies numbness, tingling, weakness, back pain, hip pain, radiation of pain.  No OTC meds tried nor ice or heat.     Review of Systems   Constitutional:  Negative for chills and fever.   HENT:  Negative for sore throat.    Respiratory:  Negative for cough and shortness of " "breath.    Cardiovascular:  Negative for chest pain, palpitations and leg swelling.   Gastrointestinal:  Negative for abdominal pain, constipation, diarrhea, nausea and vomiting.   Genitourinary:  Negative for decreased urine volume, difficulty urinating, frequency and urgency.   Musculoskeletal:  Positive for gait problem and myalgias. Negative for arthralgias, back pain and joint swelling.   Skin:  Negative for color change and wound.   Neurological:  Negative for dizziness, weakness, numbness and headaches.       Objective   /78 (BP Location: Right arm, Patient Position: Sitting, Cuff Size: Large)   Pulse 88   Temp 98.9 °F (37.2 °C) (Tympanic)   Resp 14   Ht 6' 5\" (1.956 m)   Wt 116 kg (256 lb)   SpO2 97%   BMI 30.36 kg/m²      Physical Exam  Constitutional:       General: He is not in acute distress.     Appearance: Normal appearance.   HENT:      Head: Normocephalic and atraumatic.      Comments: Bandage in place behind left ear.  Patient is s/p skin biopsy.     Mouth/Throat:      Mouth: Mucous membranes are moist.   Eyes:      Extraocular Movements: Extraocular movements intact.      Conjunctiva/sclera: Conjunctivae normal.   Pulmonary:      Effort: No respiratory distress.   Musculoskeletal:         General: No swelling, tenderness or deformity.      Cervical back: Normal range of motion.      Right hip: No tenderness or bony tenderness. Normal range of motion. Normal strength.      Left hip: No tenderness or bony tenderness. Normal range of motion. Normal strength.      Right upper leg: No tenderness.      Left upper leg: No tenderness.      Right knee: No deformity or bony tenderness. No tenderness.      Left knee: No deformity or bony tenderness. No tenderness.      Comments:   Left lateral thigh nontender to palpation.  No signs of injury, discoloration, swelling.  AUBREE negative bilaterally   Skin:     General: Skin is warm and dry.   Neurological:      General: No focal deficit present.     "  Mental Status: He is alert and oriented to person, place, and time.      Sensory: Sensation is intact.      Motor: Motor function is intact. No weakness.      Gait: Gait abnormal.      Comments:   5/5 strength in BLE  Sensation intact  Pt walking with a mild limp

## 2025-02-24 NOTE — TELEPHONE ENCOUNTER
"MOHS POSTOPERATIVE CHECK IN    How is patient doing overall? \"All good\"    Is patient having pain? no    If yes, as patient to describe pain (stabbing, throbbing, burning, etc) and rate pain (0-10): n/a    If yes, what is patient doing for pain control at this time (forward message to nurse)? N/a    Is the surgical dressing still intact? No    Is there visible blood or drainage on the outside of the dressing? no    Does the patient have questions about their discharge instructions? no    Confirmed phone number and post op appointment date and time.      "

## 2025-02-24 NOTE — PATIENT INSTRUCTIONS
While taking prednisone, please monitor your blood glucose levels daily.  If the readings become elevated please contact the office.  You may use over-the-counter Tylenol as needed for pain.  You may apply ice or heat over the affected area for 15-20 minutes several times throughout the day for pain relief.

## 2025-03-07 ENCOUNTER — OFFICE VISIT (OUTPATIENT)
Dept: DERMATOLOGY | Facility: CLINIC | Age: 70
End: 2025-03-07

## 2025-03-07 DIAGNOSIS — Z48.02 ENCOUNTER FOR REMOVAL OF SUTURES: Primary | ICD-10-CM

## 2025-03-07 LAB — HBA1C MFR BLD HPLC: 7.1 %

## 2025-03-07 PROCEDURE — NC001 PR NO CHARGE: Performed by: DERMATOLOGY

## 2025-03-07 NOTE — PROGRESS NOTES
"Suture removal    Date/Time: 3/7/2025 1:00 PM    Performed by: Shannan Epstein RN  Authorized by: Aniket Paredes MD  Universal Protocol:  procedure performed by consultantConsent: Verbal consent obtained. Written consent not obtained.  Risks and benefits: risks, benefits and alternatives were discussed  Consent given by: patient  Time out: Immediately prior to procedure a \"time out\" was called to verify the correct patient, procedure, equipment, support staff and site/side marked as required.  Timeout called at: 3/7/2025 1:00 PM.  Patient understanding: patient states understanding of the procedure being performed  Patient consent: the patient's understanding of the procedure matches consent given  Procedure consent: procedure consent matches procedure scheduled  Relevant documents: relevant documents present and verified  Test results: test results not available  Site marked: the operative site was not marked  Radiology Images displayed and confirmed. If images not available, report reviewed: imaging studies not available  Patient identity confirmed: verbally with patient      Patient location:  Clinic  Location:     Laterality:  Left    Location:  Head/neck    Head/neck location:  Neck  Procedure details:     Tools used:  Suture removal kit    Wound appearance:  No sign(s) of infection, good wound healing, clean, moist, nonpurulent, nontender and pink    Number of sutures removed:  8  Post-procedure details:     Post-procedure assessment: vaseline ointment applied.    Patient tolerance of procedure:  Tolerated well, no immediate complications  Comments:      Patient was encouraged to continue to clean and care for the wound until fully healed. Patient was encouraged to continue to follow up for regular full body skin exams as scheduled.              Well healing scar, sutures removed.    Scribe Attestation      I,:  Shannan Epstein RN am acting as a scribe while in the presence of the attending physician.:     "   I,:  Aniket Paredes MD personally performed the services described in this documentation    as scribed in my presence.:

## 2025-04-14 ENCOUNTER — TELEPHONE (OUTPATIENT)
Age: 70
End: 2025-04-14

## 2025-04-14 NOTE — TELEPHONE ENCOUNTER
Patient called in regarding a letter he received to schedule an appointment . [facility] MIR

## 2025-04-30 ENCOUNTER — OFFICE VISIT (OUTPATIENT)
Dept: DERMATOLOGY | Facility: CLINIC | Age: 70
End: 2025-04-30
Payer: MEDICARE

## 2025-04-30 VITALS — WEIGHT: 256 LBS | HEIGHT: 77 IN | BODY MASS INDEX: 30.23 KG/M2 | TEMPERATURE: 97.5 F

## 2025-04-30 DIAGNOSIS — Z85.828 HISTORY OF BASAL CELL CARCINOMA (BCC): ICD-10-CM

## 2025-04-30 DIAGNOSIS — L81.4 LENTIGO: ICD-10-CM

## 2025-04-30 DIAGNOSIS — D18.01 CHERRY ANGIOMA: ICD-10-CM

## 2025-04-30 DIAGNOSIS — Z85.89 HISTORY OF SQUAMOUS CELL CARCINOMA: Primary | ICD-10-CM

## 2025-04-30 DIAGNOSIS — D22.9 MULTIPLE BENIGN MELANOCYTIC NEVI: ICD-10-CM

## 2025-04-30 DIAGNOSIS — L57.0 KERATOSIS, ACTINIC: ICD-10-CM

## 2025-04-30 DIAGNOSIS — L82.1 SEBORRHEIC KERATOSIS: ICD-10-CM

## 2025-04-30 PROCEDURE — 99214 OFFICE O/P EST MOD 30 MIN: CPT | Performed by: STUDENT IN AN ORGANIZED HEALTH CARE EDUCATION/TRAINING PROGRAM

## 2025-04-30 PROCEDURE — 17000 DESTRUCT PREMALG LESION: CPT | Performed by: STUDENT IN AN ORGANIZED HEALTH CARE EDUCATION/TRAINING PROGRAM

## 2025-04-30 NOTE — PROGRESS NOTES
"[facility] Dermatology Clinic Note     Patient Name: Heath Schuster  Encounter Date: 4/30/25       Have you been cared for by a [facility] Dermatologist in the last 3 years and, if so, which description applies to you? Yes. I have been here within the last 3 years, and my medical history has NOT changed since that time. I am not of child-bearing potential.     REVIEW OF SYSTEMS:  Have you recently had or currently have any of the following? No changes in my recent health.   PAST MEDICAL HISTORY:  Have you personally ever had or currently have any of the following?  If \"YES,\" then please provide more detail. No changes in my medical history.   HISTORY OF IMMUNOSUPPRESSION: Do you have a history of any of the following:  Systemic Immunosuppression such as Diabetes, Biologic or Immunotherapy, Chemotherapy, Organ Transplantation, Bone Marrow Transplantation or Prednisone?  YES,diabetic     Answering \"YES\" requires the addition of the dotphrase \"IMMUNOSUPPRESSED\" as the first diagnosis of the patient's visit.   FAMILY HISTORY:  Any \"first degree relatives\" (parent, brother, sister, or child) with the following?    No changes in my family's known health.   PATIENT EXPERIENCE:    Do you want the Dermatologist to perform a COMPLETE skin exam today including a clinical examination under the \"bra and underwear\" areas?  Yes  If necessary, do we have your permission to call and leave a detailed message on your Preferred Phone number that includes your specific medical information?  Yes      Allergies   Allergen Reactions    Other Shortness Of Breath     Cats and dogs      Current Outpatient Medications:     apixaban (Eliquis) 5 mg, Take 1 tablet (5 mg total) by mouth 2 (two) times a day, Disp: 60 tablet, Rfl: 2    atorvastatin (LIPITOR) 20 mg tablet, Take 20 mg by mouth daily, Disp: , Rfl:     lisinopril (ZESTRIL) 5 mg tablet, TAKE ONE TABLET BY MOUTH EVERY DAY FOR BLOOD PRESSURE/HEART, Disp: , Rfl:     metFORMIN " (GLUCOPHAGE) 500 mg tablet, Take 1,000 mg by mouth, Disp: , Rfl:     metoprolol tartrate (LOPRESSOR) 25 mg tablet, Take 0.5 tablets by mouth 2 (two) times a day, Disp: , Rfl:     Multiple Vitamins-Minerals (CENTRUM ADULTS PO), Take 50 mg by mouth, Disp: , Rfl:     omeprazole (PriLOSEC) 20 mg delayed release capsule, , Disp: , Rfl:     predniSONE 10 mg tablet, Take 3 tabs by mouth daily for 2 days, then 2 tabs by mouth daily for 2 days, then 1 tab daily for 2 days., Disp: 12 tablet, Rfl: 0    Tadalafil, PAH, 20 MG TABS, Take 20 mg by mouth, Disp: , Rfl:               Whom besides the patient is providing clinical information about today's encounter?   NO ADDITIONAL HISTORIAN (patient alone provided history)    Physical Exam and Assessment/Plan by Diagnosis:    HISTORY OF SQUAMOUS CELL CARCINOMA - KA type     Physical Exam:  Anatomic Location Affected:  right shoulder  Morphological Description of Scar:  well healed  Suspected Recurrence: no  Regional adenopathy: no     Additional History of Present Condition:  Excision done by Dr. Leatha Florian MD on 10/03/24     Assessment and Plan:  Based on a thorough discussion of this condition and the management approach to it (including a comprehensive discussion of the known risks, side effects and potential benefits of treatment), the patient (family) agrees to implement the following specific plan:  Excision was done      How can SCC be prevented?  The most important way to prevent SCC is to avoid sunburn. This is especially important in childhood and early life. Fair skinned individuals and those with a personal or family history of BCC should protect their skin from sun exposure daily, year-round and lifelong.  Stay indoors or under the shade in the middle of the day   Wear covering clothing   Apply high protection factor SPF50+ broad-spectrum sunscreens generously to exposed skin if outdoors   Avoid indoor tanning (sun beds, solaria)        What is the outlook for  SCC?  Most SCC are cured by treatment. Cure is most likely if treatment is undertaken when the lesion is small. A small percent of SCC's can spread to lymph  nodes and long term monitoring is indicated.   They are also at increased risk of other skin cancers, especially melanoma. Regular self-skin examinations and long-term annual skin checks by an experienced health professional are recommended.     to risk of tissue necrosis and infection)    HISTORY OF BASAL CELL CARCINOMA    Physical Exam:  Anatomic Location Affected:  left occipital hair line   Morphological Description of scar:  Well healed  Suspected Recurrence: No  Pertinent Positives:  Pertinent Negatives:      Additional History of Present Condition:  History of basal cell carcinoma with no sign of recurrence    Assessment and Plan:  Based on a thorough discussion of this condition and the management approach to it (including a comprehensive discussion of the known risks, side effects and potential benefits of treatment), the patient (family) agrees to implement the following specific plan:  Monitor for signs/symptoms of change  Continue skin 6 month exams    How can basal cell carcinoma be prevented?  The most important way to prevent BCC is to avoid sunburn. This is especially important in childhood and early life. Fair skinned individuals and those with a personal or family history of BCC should protect their skin from sun exposure daily, year-round and lifelong.  Stay indoors or under the shade in the middle of the day   Wear covering clothing   Apply high protection factor SPF50+ broad-spectrum sunscreens generously to exposed skin if outdoors   Avoid indoor tanning (sun beds, solaria)  Oral nicotinamide (vitamin B3) in a dose of 500 mg twice daily may reduce the number and severity of BCCs.    What is the outlook for basal cell carcinoma?  Most BCCs are cured by treatment. Cure is most likely if treatment is undertaken when the lesion is  "small.  About 50% of people with BCC develop a second one within 3 years of the first. They are also at increased risk of other skin cancers, especially melanoma. Regular self-skin examinations and long-term annual skin checks by an experienced health professional are recommended.     MELANOCYTIC NEVI (\"Moles\")    Physical Exam:  Anatomic Location Affected:   All over the body  Morphological Description:  Scattered, 1-4mm round to ovoid, symmetrical-appearing, even bordered, skin colored to dark brown macules/papules, mostly in sun-exposed areas  Pertinent Positives:  Pertinent Negatives:    Additional History of Present Condition:  Patient presents today for follow up 6 month skin exam. Patient has a history of SCC and BCC most recent procedure done was MOH's with Dr. Paredes on 2/21/25.    Assessment and Plan:  Based on a thorough discussion of this condition and the management approach to it (including a comprehensive discussion of the known risks, side effects and potential benefits of treatment), the patient (family) agrees to implement the following specific plan:  When outside we recommend using a wide brim hat, sunglasses, long sleeve and pants, sunscreen with SPF 30+ with reapplication every 2 hours, or SPF specific clothing   Benign, reassured  Annual skin check     Melanocytic Nevi  Melanocytic nevi (\"moles\") are tan or brown, raised or flat areas of the skin which have an increased number of melanocytes. Melanocytes are the cells in our body which make pigment and account for skin color.    Some moles are present at birth (I.e., \"congenital nevi\"), while others come up later in life (i.e., \"acquired nevi\").  The sun can stimulate the body to make more moles.  Sunburns are not the only thing that triggers more moles.  Chronic sun exposure can do it too.     Clinically distinguishing a healthy mole from melanoma may be difficult, even for experienced dermatologists. The \"ABCDE's\" of moles have been " "suggested as a means of helping to alert a person to a suspicious mole and the possible increased risk of melanoma.  The suggestions for raising alert are as follows:    Asymmetry: Healthy moles tend to be symmetric, while melanomas are often asymmetric.  Asymmetry means if you draw a line through the mole, the two halves do not match in color, size, shape, or surface texture. Asymmetry can be a result of rapid enlargement of a mole, the development of a raised area on a previously flat lesion, scaling, ulceration, bleeding or scabbing within the mole.  Any mole that starts to demonstrate \"asymmetry\" should be examined promptly by a board certified dermatologist.     Border: Healthy moles tend to have discrete, even borders.  The border of a melanoma often blends into the normal skin and does not sharply delineate the mole from normal skin.  Any mole that starts to demonstrate \"uneven borders\" should be examined promptly by a board certified dermatologist.     Color: Healthy moles tend to be one color throughout.  Melanomas tend to be made up of different colors ranging from dark black, blue, white, or red.  Any mole that demonstrates a color change should be examined promptly by a board certified dermatologist.     Diameter: Healthy moles tend to be smaller than 0.6 cm in size; an exception are \"congenital nevi\" that can be larger.  Melanomas tend to grow and can often be greater than 0.6 cm (1/4 of an inch, or the size of a pencil eraser). This is only a guideline, and many normal moles may be larger than 0.6 cm without being unhealthy.  Any mole that starts to change in size (small to bigger or bigger to smaller) should be examined promptly by a board certified dermatologist.     Evolving: Healthy moles tend to \"stay the same.\"  Melanomas may often show signs of change or evolution such as a change in size, shape, color, or elevation.  Any mole that starts to itch, bleed, crust, burn, hurt, or ulcerate or " demonstrate a change or evolution should be examined promptly by a board certified dermatologist.      LENTIGO    Physical Exam:  Anatomic Location Affected:  trunk, arms  Morphological Description:  Light brown macules  Pertinent Positives:  Pertinent Negatives:    Assessment and Plan:  Based on a thorough discussion of this condition and the management approach to it (including a comprehensive discussion of the known risks, side effects and potential benefits of treatment), the patient (family) agrees to implement the following specific plan:  When outside we recommend using a wide brim hat, sunglasses, long sleeve and pants, sunscreen with SPF 30+ with reapplication every 2 hours, or SPF specific clothing     What is a lentigo?  A lentigo is a pigmented flat or slightly raised lesion with a clearly defined edge. Unlike an ephelis (freckle), it does not fade in the winter months. There are several kinds of lentigo.  The name lentigo originally referred to its appearance resembling a small lentil. The plural of lentigo is lentigines, although “lentigos” is also in common use.    Who gets lentigines?  Lentigines can affect males and females of all ages and races. Solar lentigines are especially prevalent in fair skinned adults. Lentigines associated with syndromes are present at birth or arise during childhood.    What causes lentigines?  Common forms of lentigo are due to exposure to ultraviolet radiation:  Sun damage including sunburn   Indoor tanning   Phototherapy, especially photochemotherapy (PUVA)    Ionizing radiation, eg radiation therapy, can also cause lentigines.  Several familial syndromes associated with widespread lentigines originate from mutations in Peter-MAP kinase, mTOR signaling and PTEN pathways.    What is the treatment for lentigines?  Most lentigines are left alone. Attempts to lighten them may not be successful. The following approaches are used:  SPF 50+ broad-spectrum sunscreen  "  Hydroquinone bleaching cream   Alpha hydroxy acids   Vitamin C   Retinoids   Azelaic acid   Chemical peels  Individual lesions can be permanently removed using:  Cryotherapy   Intense pulsed light   Pigment lasers    How can lentigines be prevented?  Lentigines associated with exposure ultraviolet radiation can be prevented by very careful sun protection. Clothing is more successful at preventing new lentigines than are sunscreens.    What is the outlook for lentigines?  Lentigines usually persist. They may increase in number with age and sun exposure. Some in sun-protected sites may fade and disappear.    ROCHA ANGIOMAS    Physical Exam:  Anatomic Location Affected:  Mostly on sun-exposed areas of the trunk and extremities  Morphological Description:  Scattered cherry red, 1-4 mm papules.  Pertinent Positives:  Pertinent Negatives:    Assessment and Plan:  Based on a thorough discussion of this condition and the management approach to it (including a comprehensive discussion of the known risks, side effects and potential benefits of treatment), the patient (family) agrees to implement the following specific plan:  Monitor for changes  Benign, reassured    Assessment and Plan:    Cherry angioma, also known as Giles de Srinath spots, are benign vascular skin lesions. A \"cherry angioma\" is a firm red, blue or purple papule, 0.1-1 cm in diameter. When thrombosed, they can appear black in colour until evaluated with a dermatoscope when the red or purple colour is more easily seen. Cherry angioma may develop on any part of the body but most often appear on the scalp, face, lips and trunk.  An angioma is due to proliferating endothelial cells; these are the cells that line the inside of a blood vessel.    Angiomas can arise in early life or later in life; the most common type of angioma is a cherry angioma.  Cherry angiomas are very common in males and females of any age or race. They are more noticeable in white " "skin than in skin of colour. They markedly increase in number from about the age of 40. There may be a family history of similar lesions. Eruptive cherry angiomas have been rarely reported to be associated with internal malignancy. The cause of angiomas is unknown. Genetic analysis of cherry angiomas has shown that they frequently carry specific somatic missense mutations in the GNAQ and GNA11 (Q209H) genes, which are involved in other vascular and melanocytic proliferations.      SEBORRHEIC KERATOSIS; NON-INFLAMED    Physical Exam:  Anatomic Location Affected: Mostly on sun-exposed areas of the trunk and extremities  Morphological Description:  Flat and raised, waxy, smooth to warty textured, yellow to brownish-grey to dark brown to blackish, discrete, \"stuck-on\" appearing papules.  Pertinent Positives:  Pertinent Negatives:    Assessment and Plan:  Based on a thorough discussion of this condition and the management approach to it (including a comprehensive discussion of the known risks, side effects and potential benefits of treatment), the patient (family) agrees to implement the following specific plan:  Monitor for changes  Benign, reassured    Seborrheic Keratosis  A seborrheic keratosis is a harmless warty spot that appears during adult life as a common sign of skin aging.  Seborrheic keratoses can arise on any area of skin, covered or uncovered, with the usual exception of the palms and soles. They do not arise from mucous membranes. Seborrheic keratoses can have highly variable appearance.      Seborrheic keratoses are extremely common. It has been estimated that over 90% of adults over the age of 60 years have one or more of them. They occur in males and females of all races, typically beginning to erupt in the 30s or 40s. They are uncommon under the age of 20 years.  The precise cause of seborrhoeic keratoses is not known.  Seborrhoeic keratoses are considered degenerative in nature. As time goes by, " "seborrheic keratoses tend to become more numerous. Some people inherit a tendency to develop a very large number of them; some people may have hundreds of them.      There is no easy way to remove multiple lesions on a single occasion.  Unless a specific lesion is \"inflamed\" and is causing pain or stinging/burning or is bleeding, most insurance companies do not authorize treatment.    ACTINIC KERATOSIS    Physical Exam:  Anatomic Location: Left ear  Morphologic Description:  Scaly pink papule  Pertinent Positives:  Pertinent Negatives:    Additional History of Present Condition:  Found on exam  History of bleeding from this lesion? NO  History of pain, tenderness, and/or itching within this lesion? NO  Personal history of skin cancer? YES, BCC and SCC  Family history of skin cancer? NO  Previous treatment to the same site? NO    Plan:  PRESCRIPTION MANAGEMENT:  Several topical management options and their side effects were discussed including \"field therapies\" such as Efudex (5-fluorouracil) and/or Aldara (imiquimod). Efudex may be used alone or in combination with Calcipotriene. Begin treatment once regions that have been sprayed with liquid nitrogen are healed. Redness and irritation are to be expected within a few days of field therapy treatment and should resolve within 1 to 2 weeks following treatment. Do NOT cover the treatment areas with bandages. Use a sunscreen with an SPF 50+ while using field therapy.  We discussed the pre-cancerous nature of the condition. Actinic keratosis is found on sun-damaged skin and there is small risk that the condition could turn into a skin cancer called squamous cell carcinoma. There is no risk of actinic keratosis turning into melanoma.  Proliferative actinic keratosis tends to be resistant against standard treatments, including topical therapies and cryotherapy. It has a tendency to develop into infiltrative squamous cell carcinoma. Excision may be considered.  We discussed " sun protection measures, including using sunscreen with an SPF 50+ year round, avoiding the sun, and wearing protective clothing such as long sleeves and pants when out in the sun.  Continue to monitor clinically for signs of recurrence. Discussed with patient the importance of keeping up to date with full body skin exams.  Cryotherapy performed in the office (See Procedure Note). We discussed that a hypopigmentation or scar may form in the region following cryotherapy.     PROCEDURES PERFORMED TODAY ASSOCIATED WITH THIS CONDITION:          Cryotherapy: PROCEDURE:  DESTRUCTION OF PRE-MALIGNANT LESIONS  After a thorough discussion of treatment options and risk/benefits/alternatives (including but not limited to local pain, scarring, dyspigmentation, blistering, and possible superinfection), verbal and written consent were obtained and the aforementioned lesions were treated on with cryotherapy using liquid nitrogen x 1 cycle for 5-10 seconds.    TOTAL NUMBER of 1 pre-malignant lesions were treated today on the ANATOMIC LOCATION: Left ear.     The patient tolerated the procedure well, and after-care instructions were provided.         MEDICAL DECISION MAKING  Treatment Goal:  Resolution of this ACUTE, UNCOMPLICATED condition.       A recent or new short-term problem for which treatment is CONSIDERED OR INITIATED. There is little to no risk of mortality with treatment, and full recovery without functional impairment is expected.  Diagnosis or treatment significantly limited by the following social determinants of health:  NONE IDENTIFIED             Scribe Attestation      I,:  Radha Montgomery am acting as a scribe while in the presence of the attending physician.:       I,:  Nataliya Tapia MD personally performed the services described in this documentation    as scribed in my presence.:            This encounter (70699 or [id number]) has a MODERATE level of medical decision making (MDM) given the presence of at least 2 of  the elements of MDM (below):  *MODERATE number and complexity of problems addressed: 1 or more chronic illnesses with exacerbation, progression, or side effects of treatment; OR 2 or more stable chronic illnesses; OR 1 undiagnosed new problem with uncertain prognosis; OR 1 acute illness with systemic symptoms; OR 1 acute uncomplicated injury  *MODERATE amount and/or complexity of data reviewed: this includes review of previous notes and/or lab tests, independent interpretation of tests performed by another healthcare professional, ordering tests, assessment requiring independent historian, or discussion with other healthcare professionals.  *MODERATE risk of complications and/or morbidity or mortality of patient management (for example, prescription drug management, decisions regarding minor surgery with identified patient or procedure risk factors).

## 2025-04-30 NOTE — PATIENT INSTRUCTIONS
HISTORY OF SQUAMOUS CELL CARCINOMA - KA type     Assessment and Plan:  Based on a thorough discussion of this condition and the management approach to it (including a comprehensive discussion of the known risks, side effects and potential benefits of treatment), the patient (family) agrees to implement the following specific plan:  Excision was done      How can SCC be prevented?  The most important way to prevent SCC is to avoid sunburn. This is especially important in childhood and early life. Fair skinned individuals and those with a personal or family history of BCC should protect their skin from sun exposure daily, year-round and lifelong.  Stay indoors or under the shade in the middle of the day   Wear covering clothing   Apply high protection factor SPF50+ broad-spectrum sunscreens generously to exposed skin if outdoors   Avoid indoor tanning (sun beds, solaria)        What is the outlook for SCC?  Most SCC are cured by treatment. Cure is most likely if treatment is undertaken when the lesion is small. A small percent of SCC's can spread to lymph  nodes and long term monitoring is indicated.   They are also at increased risk of other skin cancers, especially melanoma. Regular self-skin examinations and long-term annual skin checks by an experienced health professional are recommended.     to risk of tissue necrosis and infection)    HISTORY OF BASAL CELL CARCINOMA      Assessment and Plan:  Based on a thorough discussion of this condition and the management approach to it (including a comprehensive discussion of the known risks, side effects and potential benefits of treatment), the patient (family) agrees to implement the following specific plan:  Monitor for signs/symptoms of change  Continue skin 6 month exams    How can basal cell carcinoma be prevented?  The most important way to prevent BCC is to avoid sunburn. This is especially important in childhood and early life. Fair skinned individuals and  "those with a personal or family history of BCC should protect their skin from sun exposure daily, year-round and lifelong.  Stay indoors or under the shade in the middle of the day   Wear covering clothing   Apply high protection factor SPF50+ broad-spectrum sunscreens generously to exposed skin if outdoors   Avoid indoor tanning (sun beds, solaria)  Oral nicotinamide (vitamin B3) in a dose of 500 mg twice daily may reduce the number and severity of BCCs.    What is the outlook for basal cell carcinoma?  Most BCCs are cured by treatment. Cure is most likely if treatment is undertaken when the lesion is small.  About 50% of people with BCC develop a second one within 3 years of the first. They are also at increased risk of other skin cancers, especially melanoma. Regular self-skin examinations and long-term annual skin checks by an experienced health professional are recommended.     MELANOCYTIC NEVI (\"Moles\")    Assessment and Plan:  Based on a thorough discussion of this condition and the management approach to it (including a comprehensive discussion of the known risks, side effects and potential benefits of treatment), the patient (family) agrees to implement the following specific plan:  When outside we recommend using a wide brim hat, sunglasses, long sleeve and pants, sunscreen with SPF 30+ with reapplication every 2 hours, or SPF specific clothing   Benign, reassured  Annual skin check     Melanocytic Nevi  Melanocytic nevi (\"moles\") are tan or brown, raised or flat areas of the skin which have an increased number of melanocytes. Melanocytes are the cells in our body which make pigment and account for skin color.    Some moles are present at birth (I.e., \"congenital nevi\"), while others come up later in life (i.e., \"acquired nevi\").  The sun can stimulate the body to make more moles.  Sunburns are not the only thing that triggers more moles.  Chronic sun exposure can do it too.     Clinically distinguishing a " "healthy mole from melanoma may be difficult, even for experienced dermatologists. The \"ABCDE's\" of moles have been suggested as a means of helping to alert a person to a suspicious mole and the possible increased risk of melanoma.  The suggestions for raising alert are as follows:    Asymmetry: Healthy moles tend to be symmetric, while melanomas are often asymmetric.  Asymmetry means if you draw a line through the mole, the two halves do not match in color, size, shape, or surface texture. Asymmetry can be a result of rapid enlargement of a mole, the development of a raised area on a previously flat lesion, scaling, ulceration, bleeding or scabbing within the mole.  Any mole that starts to demonstrate \"asymmetry\" should be examined promptly by a board certified dermatologist.     Border: Healthy moles tend to have discrete, even borders.  The border of a melanoma often blends into the normal skin and does not sharply delineate the mole from normal skin.  Any mole that starts to demonstrate \"uneven borders\" should be examined promptly by a board certified dermatologist.     Color: Healthy moles tend to be one color throughout.  Melanomas tend to be made up of different colors ranging from dark black, blue, white, or red.  Any mole that demonstrates a color change should be examined promptly by a board certified dermatologist.     Diameter: Healthy moles tend to be smaller than 0.6 cm in size; an exception are \"congenital nevi\" that can be larger.  Melanomas tend to grow and can often be greater than 0.6 cm (1/4 of an inch, or the size of a pencil eraser). This is only a guideline, and many normal moles may be larger than 0.6 cm without being unhealthy.  Any mole that starts to change in size (small to bigger or bigger to smaller) should be examined promptly by a board certified dermatologist.     Evolving: Healthy moles tend to \"stay the same.\"  Melanomas may often show signs of change or evolution such as a change " in size, shape, color, or elevation.  Any mole that starts to itch, bleed, crust, burn, hurt, or ulcerate or demonstrate a change or evolution should be examined promptly by a board certified dermatologist.      LENTIGO    Assessment and Plan:  Based on a thorough discussion of this condition and the management approach to it (including a comprehensive discussion of the known risks, side effects and potential benefits of treatment), the patient (family) agrees to implement the following specific plan:  When outside we recommend using a wide brim hat, sunglasses, long sleeve and pants, sunscreen with SPF 30+ with reapplication every 2 hours, or SPF specific clothing     What is a lentigo?  A lentigo is a pigmented flat or slightly raised lesion with a clearly defined edge. Unlike an ephelis (freckle), it does not fade in the winter months. There are several kinds of lentigo.  The name lentigo originally referred to its appearance resembling a small lentil. The plural of lentigo is lentigines, although “lentigos” is also in common use.    Who gets lentigines?  Lentigines can affect males and females of all ages and races. Solar lentigines are especially prevalent in fair skinned adults. Lentigines associated with syndromes are present at birth or arise during childhood.    What causes lentigines?  Common forms of lentigo are due to exposure to ultraviolet radiation:  Sun damage including sunburn   Indoor tanning   Phototherapy, especially photochemotherapy (PUVA)    Ionizing radiation, eg radiation therapy, can also cause lentigines.  Several familial syndromes associated with widespread lentigines originate from mutations in Peter-MAP kinase, mTOR signaling and PTEN pathways.    What is the treatment for lentigines?  Most lentigines are left alone. Attempts to lighten them may not be successful. The following approaches are used:  SPF 50+ broad-spectrum sunscreen   Hydroquinone bleaching cream   Alpha hydroxy acids  "  Vitamin C   Retinoids   Azelaic acid   Chemical peels  Individual lesions can be permanently removed using:  Cryotherapy   Intense pulsed light   Pigment lasers    How can lentigines be prevented?  Lentigines associated with exposure ultraviolet radiation can be prevented by very careful sun protection. Clothing is more successful at preventing new lentigines than are sunscreens.    What is the outlook for lentigines?  Lentigines usually persist. They may increase in number with age and sun exposure. Some in sun-protected sites may fade and disappear.    ROCHA ANGIOMAS    Assessment and Plan:  Based on a thorough discussion of this condition and the management approach to it (including a comprehensive discussion of the known risks, side effects and potential benefits of treatment), the patient (family) agrees to implement the following specific plan:  Monitor for changes  Benign, reassured    Assessment and Plan:    Cherry angioma, also known as Giles de Srinath spots, are benign vascular skin lesions. A \"cherry angioma\" is a firm red, blue or purple papule, 0.1-1 cm in diameter. When thrombosed, they can appear black in colour until evaluated with a dermatoscope when the red or purple colour is more easily seen. Cherry angioma may develop on any part of the body but most often appear on the scalp, face, lips and trunk.  An angioma is due to proliferating endothelial cells; these are the cells that line the inside of a blood vessel.    Angiomas can arise in early life or later in life; the most common type of angioma is a cherry angioma.  Cherry angiomas are very common in males and females of any age or race. They are more noticeable in white skin than in skin of colour. They markedly increase in number from about the age of 40. There may be a family history of similar lesions. Eruptive cherry angiomas have been rarely reported to be associated with internal malignancy. The cause of angiomas is unknown. " "Genetic analysis of cherry angiomas has shown that they frequently carry specific somatic missense mutations in the GNAQ and GNA11 (Q209H) genes, which are involved in other vascular and melanocytic proliferations.      SEBORRHEIC KERATOSIS; NON-INFLAMED    Assessment and Plan:  Based on a thorough discussion of this condition and the management approach to it (including a comprehensive discussion of the known risks, side effects and potential benefits of treatment), the patient (family) agrees to implement the following specific plan:  Monitor for changes  Benign, reassured    Seborrheic Keratosis  A seborrheic keratosis is a harmless warty spot that appears during adult life as a common sign of skin aging.  Seborrheic keratoses can arise on any area of skin, covered or uncovered, with the usual exception of the palms and soles. They do not arise from mucous membranes. Seborrheic keratoses can have highly variable appearance.      Seborrheic keratoses are extremely common. It has been estimated that over 90% of adults over the age of 60 years have one or more of them. They occur in males and females of all races, typically beginning to erupt in the 30s or 40s. They are uncommon under the age of 20 years.  The precise cause of seborrhoeic keratoses is not known.  Seborrhoeic keratoses are considered degenerative in nature. As time goes by, seborrheic keratoses tend to become more numerous. Some people inherit a tendency to develop a very large number of them; some people may have hundreds of them.      There is no easy way to remove multiple lesions on a single occasion.  Unless a specific lesion is \"inflamed\" and is causing pain or stinging/burning or is bleeding, most insurance companies do not authorize treatment.    ACTINIC KERATOSIS      Plan:  PRESCRIPTION MANAGEMENT:  Several topical management options and their side effects were discussed including \"field therapies\" such as Efudex (5-fluorouracil) and/or " Aldara (imiquimod). Efudex may be used alone or in combination with Calcipotriene. Begin treatment once regions that have been sprayed with liquid nitrogen are healed. Redness and irritation are to be expected within a few days of field therapy treatment and should resolve within 1 to 2 weeks following treatment. Do NOT cover the treatment areas with bandages. Use a sunscreen with an SPF 50+ while using field therapy.  We discussed the pre-cancerous nature of the condition. Actinic keratosis is found on sun-damaged skin and there is small risk that the condition could turn into a skin cancer called squamous cell carcinoma. There is no risk of actinic keratosis turning into melanoma.  Proliferative actinic keratosis tends to be resistant against standard treatments, including topical therapies and cryotherapy. It has a tendency to develop into infiltrative squamous cell carcinoma. Excision may be considered.  We discussed sun protection measures, including using sunscreen with an SPF 50+ year round, avoiding the sun, and wearing protective clothing such as long sleeves and pants when out in the sun.  Continue to monitor clinically for signs of recurrence. Discussed with patient the importance of keeping up to date with full body skin exams.  Cryotherapy performed in the office (See Procedure Note). We discussed that a hypopigmentation or scar may form in the region following cryotherapy.     PROCEDURES PERFORMED TODAY ASSOCIATED WITH THIS CONDITION:          Cryotherapy: PROCEDURE:  DESTRUCTION OF PRE-MALIGNANT LESIONS  After a thorough discussion of treatment options and risk/benefits/alternatives (including but not limited to local pain, scarring, dyspigmentation, blistering, and possible superinfection), verbal and written consent were obtained and the aforementioned lesions were treated on with cryotherapy using liquid nitrogen x 1 cycle for 5-10 seconds.    TOTAL NUMBER of 1 pre-malignant lesions were treated  today on the ANATOMIC LOCATION: Left ear.     The patient tolerated the procedure well, and after-care instructions were provided.         MEDICAL DECISION MAKING  Treatment Goal:  Resolution of this ACUTE, UNCOMPLICATED condition.       A recent or new short-term problem for which treatment is CONSIDERED OR INITIATED. There is little to no risk of mortality with treatment, and full recovery without functional impairment is expected.  Diagnosis or treatment significantly limited by the following social determinants of health:  NONE IDENTIFIED

## 2025-05-21 ENCOUNTER — PROCEDURE VISIT (OUTPATIENT)
Dept: PODIATRY | Facility: CLINIC | Age: 70
End: 2025-05-21

## 2025-05-21 VITALS — OXYGEN SATURATION: 97 % | BODY MASS INDEX: 30.23 KG/M2 | HEIGHT: 77 IN | HEART RATE: 97 BPM | WEIGHT: 256 LBS

## 2025-05-21 DIAGNOSIS — E11.9 TYPE 2 DIABETES MELLITUS WITHOUT COMPLICATION, WITHOUT LONG-TERM CURRENT USE OF INSULIN (HCC): ICD-10-CM

## 2025-05-21 DIAGNOSIS — B35.1 ONYCHOMYCOSIS: Primary | ICD-10-CM

## 2025-05-23 NOTE — PROGRESS NOTES
":  Assessment & Plan  Onychomycosis         Type 2 diabetes mellitus without complication, without long-term current use of insulin (Formerly [facility])    Lab Results   Component Value Date    HGBA1C 7.1 03/07/2025              The patient's clinical examination today significant for brittle, thickened and discolored pedal nail plates consistent with onychomycosis x 10.  There are no open lesions nor signs of infection noted to either lower extremity.  Pedal pulses are palpable bilaterally.  Interdigital spaces are clear without maceration.  Vibratory and epicritic sensations are intact bilaterally.  Skin is dry without pruritus.  There is decreased turgor with diminished hair growth bilaterally.     The pedal nail plates are sharply debrided with a sterile nail clipper x 10 without complication.  The nails then mechanically reduced in thickness and girth denies a rotary bur without incident.  The blister site is stable and does not require any dressings.  His most recent hemoglobin A1c from September 2024 was 6.8, prior to that it was 6.2 in March 2023.     Follow-up in 3 months or as needed.    History of Present Illness     Heath Schuster is a 69 y.o. male   The patient presents today for follow-up of thickened and elongated pedal nail plates.  He has a history of diabetes.  He notes no other acute pedal issues today.      PAST MEDICAL HISTORY:  Past Medical History[1]    PAST SURGICAL HISTORY:  Past Surgical History[2]     ALLERGIES:  Other    MEDICATIONS:  Current Medications[3]    SOCIAL HISTORY:  Social History[4]   Review of Systems   Constitutional: Negative.    HENT: Negative.     Eyes: Negative.    Respiratory: Negative.     Cardiovascular: Negative.    Endocrine: Negative.    Musculoskeletal: Negative.    Neurological: Negative.    Hematological: Negative.    Psychiatric/Behavioral: Negative.       Objective   Pulse 97   Ht 6' 5\" (1.956 m)   Wt 116 kg (256 lb)   SpO2 97%   BMI 30.36 kg/m²      Physical " Exam  Constitutional:       Appearance: Normal appearance.   HENT:      Head: Normocephalic and atraumatic.     Cardiovascular:      Pulses:           Dorsalis pedis pulses are 1+ on the right side and 1+ on the left side.        Posterior tibial pulses are 1+ on the right side and 1+ on the left side.   Pulmonary:      Effort: Pulmonary effort is normal.   Feet:      Comments: The patient's clinical examination today significant for brittle, thickened and discolored pedal nail plates consistent with onychomycosis x 10.  There are no open lesions nor signs of infection noted to either lower extremity.  Pedal pulses are palpable bilaterally.  Interdigital spaces are clear without maceration.  Vibratory and epicritic sensations are intact bilaterally.  Skin is dry without pruritus.  There is decreased turgor with diminished hair growth bilaterally.    Skin:     General: Skin is warm and dry.      Capillary Refill: Capillary refill takes 2 to 3 seconds.     Neurological:      General: No focal deficit present.      Mental Status: He is alert and oriented to person, place, and time.     Psychiatric:         Mood and Affect: Mood normal.                [1]   Past Medical History:  Diagnosis Date    Appendicitis     Atrial fibrillation (HCC)     Basal cell carcinoma 10/16/2024    left occipital hair line    Colon polyp     Diabetes mellitus (HCC)     Hyperlipidemia     Hypertension     Shoulder pain, right 03/31/2023   [2]   Past Surgical History:  Procedure Laterality Date    APPENDECTOMY      CHOLECYSTECTOMY      COLONOSCOPY      MOHS SURGERY Left 02/21/2025    left occipital hair line   [3]   Current Outpatient Medications   Medication Sig Dispense Refill    apixaban (Eliquis) 5 mg Take 1 tablet (5 mg total) by mouth 2 (two) times a day 60 tablet 2    atorvastatin (LIPITOR) 20 mg tablet Take 20 mg by mouth in the morning.      lisinopril (ZESTRIL) 5 mg tablet       metFORMIN (GLUCOPHAGE) 500 mg tablet Take 1,000 mg by  mouth      metoprolol tartrate (LOPRESSOR) 25 mg tablet Take 0.5 tablets by mouth in the morning and 0.5 tablets in the evening.      Multiple Vitamins-Minerals (CENTRUM ADULTS PO) Take 50 mg by mouth      omeprazole (PriLOSEC) 20 mg delayed release capsule       Tadalafil, PAH, 20 MG TABS Take 20 mg by mouth      predniSONE 10 mg tablet Take 3 tabs by mouth daily for 2 days, then 2 tabs by mouth daily for 2 days, then 1 tab daily for 2 days. 12 tablet 0     No current facility-administered medications for this visit.   [4]   Social History  Socioeconomic History    Marital status:    Tobacco Use    Smoking status: Never     Passive exposure: Never    Smokeless tobacco: Never   Vaping Use    Vaping status: Never Used   Substance and Sexual Activity    Alcohol use: Not Currently    Drug use: Never    Sexual activity: Not Currently     Partners: Female     Social Drivers of Health     Financial Resource Strain: Low Risk  (1/13/2023)    Overall Financial Resource Strain (CARDIA)     Difficulty of Paying Living Expenses: Not hard at all   Food Insecurity: No Food Insecurity (5/7/2024)    Nursing - Inadequate Food Risk Classification     Worried About Running Out of Food in the Last Year: Never true     Ran Out of Food in the Last Year: Never true   Transportation Needs: No Transportation Needs (5/7/2024)    PRAPARE - Transportation     Lack of Transportation (Medical): No     Lack of Transportation (Non-Medical): No   Physical Activity: Sufficiently Active (2/24/2025)    Exercise Vital Sign     Days of Exercise per Week: 7 days     Minutes of Exercise per Session: 60 min   Stress: No Stress Concern Present (2/24/2025)    [employer] - Occupational Stress Questionnaire     Feeling of Stress : Not at all   Housing Stability: Unknown (5/7/2024)    Housing Stability Vital Sign     Unable to Pay for Housing in the Last Year: No     Homeless in the Last Year: No

## 2025-07-21 ENCOUNTER — PROCEDURE VISIT (OUTPATIENT)
Dept: PODIATRY | Facility: CLINIC | Age: 70
End: 2025-07-21
Payer: MEDICARE

## 2025-07-21 VITALS — HEART RATE: 81 BPM | WEIGHT: 256 LBS | OXYGEN SATURATION: 97 % | HEIGHT: 77 IN | BODY MASS INDEX: 30.23 KG/M2

## 2025-07-21 DIAGNOSIS — E11.9 TYPE 2 DIABETES MELLITUS WITHOUT COMPLICATION, WITHOUT LONG-TERM CURRENT USE OF INSULIN (HCC): ICD-10-CM

## 2025-07-21 DIAGNOSIS — B35.1 ONYCHOMYCOSIS: Primary | ICD-10-CM

## 2025-07-21 PROCEDURE — 11721 DEBRIDE NAIL 6 OR MORE: CPT | Performed by: PODIATRIST

## 2025-07-21 NOTE — PROGRESS NOTES
"Name: Heath Schuster      : 1955      MRN: [id number]  Encounter Provider: Celso Rivera DPM  Encounter Date: 2025   Encounter department: [facility] PODIATRY [facility]  :  Assessment & Plan  Onychomycosis    The patient's clinical examination today significant for brittle, thickened and discolored pedal nail plates consistent with onychomycosis x 10.  There are no open lesions nor signs of infection noted to either lower extremity.  Pedal pulses are palpable bilaterally.  Interdigital spaces are clear without maceration.  Vibratory and epicritic sensations are intact bilaterally.  Skin is dry without pruritus.  There is decreased turgor with diminished hair growth bilaterally.     The pedal nail plates are sharply debrided with a sterile nail clipper x 10 without complication.  The nails then mechanically reduced in thickness and girth denies a rotary bur without incident.  His most recent hemoglobin A1c from 2024 was 6.8, prior to that it was 6.2 in 2023.     Follow-up in 3 months or as needed.    Type 2 diabetes mellitus without complication, without long-term current use of insulin (Formerly [facility])    Lab Results   Component Value Date    HGBA1C 7.1 2025                History of Present Illness   HPI  Heath Schuster is a 69 y.o. male who presents today for diabetic footcare.  He notes no other acute pedal issues today.      Review of Systems   Constitutional: Negative.    Respiratory: Negative.     Cardiovascular: Negative.    Psychiatric/Behavioral: Negative.       Pertinent Medical History     PAST MEDICAL HISTORY:  Past Medical History[1]    PAST SURGICAL HISTORY:  Past Surgical History[2]     ALLERGIES:  Other    MEDICATIONS:  Current Medications[3]         Objective   Pulse 81   Ht 6' 5\" (1.956 m)   Wt 116 kg (256 lb)   SpO2 97%   BMI 30.36 kg/m²      Physical Exam  Constitutional:       Appearance: Normal appearance.   HENT:      Head: Normocephalic and " atraumatic.     Cardiovascular:      Pulses:           Dorsalis pedis pulses are 2+ on the right side and 2+ on the left side.        Posterior tibial pulses are 2+ on the right side and 2+ on the left side.   Pulmonary:      Effort: Pulmonary effort is normal.   Feet:      Right foot:      Skin integrity: Skin integrity normal.      Toenail Condition: Right toenails are abnormally thick and long. Fungal disease present.     Left foot:      Skin integrity: Skin integrity normal.      Toenail Condition: Left toenails are abnormally thick and long. Fungal disease present.     Comments: The patient's clinical examination today significant for brittle, thickened and discolored pedal nail plates consistent with onychomycosis x 10.  There are no open lesions nor signs of infection noted to either lower extremity.  Pedal pulses are palpable bilaterally.  Interdigital spaces are clear without maceration.  Vibratory and epicritic sensations are intact bilaterally.  Skin is dry without pruritus.  There is decreased turgor with diminished hair growth bilaterally.       Skin:     General: Skin is warm and dry.      Capillary Refill: Capillary refill takes less than 2 seconds.     Neurological:      General: No focal deficit present.      Mental Status: He is alert and oriented to person, place, and time.     Psychiatric:         Mood and Affect: Mood normal.                [1]   Past Medical History:  Diagnosis Date    Appendicitis     Atrial fibrillation (HCC)     Basal cell carcinoma 10/16/2024    left occipital hair line    Colon polyp     Diabetes mellitus (HCC)     Hyperlipidemia     Hypertension     Shoulder pain, right 03/31/2023   [2]   Past Surgical History:  Procedure Laterality Date    APPENDECTOMY      CHOLECYSTECTOMY      COLONOSCOPY      MOHS SURGERY Left 02/21/2025    left occipital hair line   [3]   Current Outpatient Medications   Medication Sig Dispense Refill    apixaban (Eliquis) 5 mg Take 1 tablet (5 mg  total) by mouth 2 (two) times a day 60 tablet 2    atorvastatin (LIPITOR) 20 mg tablet Take 20 mg by mouth in the morning.      lisinopril (ZESTRIL) 5 mg tablet       metFORMIN (GLUCOPHAGE) 500 mg tablet Take 1,000 mg by mouth      metoprolol tartrate (LOPRESSOR) 25 mg tablet Take 0.5 tablets by mouth in the morning and 0.5 tablets in the evening.      Multiple Vitamins-Minerals (CENTRUM ADULTS PO) Take 50 mg by mouth      omeprazole (PriLOSEC) 20 mg delayed release capsule       Tadalafil, PAH, 20 MG TABS Take 20 mg by mouth       No current facility-administered medications for this visit.